# Patient Record
Sex: FEMALE | Race: WHITE
[De-identification: names, ages, dates, MRNs, and addresses within clinical notes are randomized per-mention and may not be internally consistent; named-entity substitution may affect disease eponyms.]

---

## 2019-12-23 LAB
ANISOCYTOSIS BLD QL: (no result)
BLD SMEAR INTERP: (no result)
BUN BLD-MCNC: 9 MG/DL (ref 7–18)
GLUCOSE SERPLBLD-MCNC: 96 MG/DL (ref 74–106)
HCT VFR BLD CALC: 30.8 % (ref 36–45)
LYMPHOCYTES # SPEC AUTO: 1.3 K/UL (ref 0.7–4.9)
MORPHOLOGY BLD-IMP: (no result)
PMV BLD: 7.2 FL (ref 7.6–11.3)
POTASSIUM SERPL-SCNC: 3.8 MMOL/L (ref 3.5–5.1)
RBC # BLD: 3.89 M/UL (ref 3.86–4.86)

## 2019-12-23 NOTE — RAD REPORT
EXAM DESCRIPTION:  RAD - Chest Pa And Lat (2 Views) - 12/23/2019 12:00 pm

 

CLINICAL HISTORY:  preopright arm soft tissue injury repair

 

COMPARISON:  None.

 

TECHNIQUE:  PA and lateral views of the chest were obtained.

 

FINDINGS:  The lungs are fibrotic as a baseline. An acute infiltrate is not identified. Scarring agrawal
ges and pericardial fat accentuate the left base. Patient has a large hiatal hernia that increases ov
erall density in the retrocardiac region.   Heart size is normal and central vasculature is within no
rmal limits.  No pleural effusion or pneumothorax seen.  No acute bony finding noted.  No aortic abno
rmality.

 

IMPRESSION:  Fibrotic lung change with no acute cardiopulmonary finding.

## 2019-12-23 NOTE — EKG
Test Date:    2019-12-23               Test Time:    11:42:35

Technician:   SHAYY                                    

                                                     

MEASUREMENT RESULTS:                                       

Intervals:                                           

Rate:         66                                     

TN:           150                                    

QRSD:         88                                     

QT:           422                                    

QTc:          442                                    

Axis:                                                

P:            61                                     

TN:           150                                    

QRS:          40                                     

T:            70                                     

                                                     

INTERPRETIVE STATEMENTS:                                       

                                                     

Sinus rhythm with premature atrial complexes

Otherwise normal ECG

Compared to ECG 09/05/2007 09:41:44

Atrial premature complex(es) now present



Electronically Signed On 12-23-19 20:46:41 CST by Peter Petersen

## 2019-12-26 ENCOUNTER — HOSPITAL ENCOUNTER (OUTPATIENT)
Dept: HOSPITAL 97 - OR | Age: 74
Discharge: HOME | End: 2019-12-26
Attending: SURGERY
Payer: COMMERCIAL

## 2019-12-26 VITALS — DIASTOLIC BLOOD PRESSURE: 68 MMHG | OXYGEN SATURATION: 94 % | TEMPERATURE: 97 F | SYSTOLIC BLOOD PRESSURE: 1365 MMHG

## 2019-12-26 DIAGNOSIS — T81.89XA: Primary | ICD-10-CM

## 2019-12-26 DIAGNOSIS — Z79.02: ICD-10-CM

## 2019-12-26 DIAGNOSIS — I38: ICD-10-CM

## 2019-12-26 DIAGNOSIS — F03.90: ICD-10-CM

## 2019-12-26 DIAGNOSIS — E78.5: ICD-10-CM

## 2019-12-26 DIAGNOSIS — Z88.0: ICD-10-CM

## 2019-12-26 DIAGNOSIS — F32.9: ICD-10-CM

## 2019-12-26 DIAGNOSIS — D64.9: ICD-10-CM

## 2019-12-26 DIAGNOSIS — I10: ICD-10-CM

## 2019-12-26 DIAGNOSIS — E03.9: ICD-10-CM

## 2019-12-26 PROCEDURE — 88304 TISSUE EXAM BY PATHOLOGIST: CPT

## 2019-12-26 PROCEDURE — 11042 DBRDMT SUBQ TIS 1ST 20SQCM/<: CPT

## 2019-12-26 PROCEDURE — 71046 X-RAY EXAM CHEST 2 VIEWS: CPT

## 2019-12-26 PROCEDURE — 80048 BASIC METABOLIC PNL TOTAL CA: CPT

## 2019-12-26 PROCEDURE — 93005 ELECTROCARDIOGRAM TRACING: CPT

## 2019-12-26 PROCEDURE — 0JBD0ZZ EXCISION OF RIGHT UPPER ARM SUBCUTANEOUS TISSUE AND FASCIA, OPEN APPROACH: ICD-10-PCS

## 2019-12-26 PROCEDURE — 85025 COMPLETE CBC W/AUTO DIFF WBC: CPT

## 2019-12-26 PROCEDURE — 36415 COLL VENOUS BLD VENIPUNCTURE: CPT

## 2019-12-26 NOTE — XMS REPORT
Patient Summary Document

 Created on:2019



Patient:SUNDEEP REYES

Sex:Female

:1945

External Reference #:[id number]





Demographics







 Address  [address]



   [place] [address]

 

 Home Phone  [phone] CELL

 

 Work Phone  [phone]

 

 Email Address  [phone]

 

 Preferred Language  Unknown

 

 Marital Status  Unknown

 

 Jew Affiliation  Unknown

 

 Race  Unknown

 

 Additional Race(s)  Unavailable

 

 Ethnic Group  Unknown









Author







 Organization  [facility]

 

 Address  [address] [place] [address] [place] [address]

 

 Phone  [phone]









Care Team Providers







 Name  Role  Phone

 

 Unavailable  Unavailable  Unavailable









Problems

This patient has no known problems.



Allergies, Adverse Reactions, Alerts

This patient has no known allergies or adverse reactions.



Medications

This patient has no known medications.

## 2019-12-26 NOTE — OP
Date of Procedure:  12/26/2019



Surgeon:  Jaime Gresham MD



Preoperative Diagnosis:  Nonhealing wound, right anterior shoulder.



Postoperative Diagnosis:  Nonhealing wound, right anterior shoulder.



Procedure:  Wide excision, nonhealing right anterior shoulder wounds 4 x 2 cm.



Estimated Blood Loss:  Minimal.



Specimens:  Nonhealing wound.



Findings:  Likely suture granuloma.



Anesthesia:  MAC.



Complications:  None.



Disposition:  Patient tolerated procedure in stable condition, taken to Recovery in good general cond
ition.



Procedure In Detail:  Patient was brought to the OR and placed in supine position.  MAC anesthesia wa
s begun.  The patient was prepped and draped in the usual sterile fashion.  Marcaine 0.5% was infiltr
ated locally.  A 15-blade was used to make a 4 x 2 cm incision around the area of the nonhealing woun
d down to the subcutaneous tissue, excised and sent to pathology as specimen.  No obvious suture seen
, but there was a lot of granulation tissue and inflammatory tissue that was seen.  This was all exci
sed to healthy tissue and then wound irrigated, bleeding controlled with cautery and 3-0 chromic used
 to approximate the subcutaneous tissue.  The skin left open as the patient had a draining wound prio
r to this and sterile dressing was applied.  Patient was awakened and taken to recovery in good gener
al condition.



Discharge Note:  The patient will go to Day-Surgery, home when stable.



Condition:  Stable.



Discharge Instructions:  Resume home medications and diet.  Activity as tolerated.  No heavy lifting.
  Remove outer dressing in 2 days.  Shower.  Keep wound clean and dry.  Dry gauze to wound daily.  Fo
llow up [facility] in [facility] weeks.  Call for appointment.  Tylenol No. 3 one tablet p.o. q
.4 p.r.n. pain and Cipro 500 mg p.o. q.12.  May resume blood thinners tomorrow.





AS/MODL

DD:  12/26/2019 11:21:09Voice ID:  [id number]

DT:  12/26/2019 22:48:25Report ID:  [id number]

## 2020-05-14 ENCOUNTER — HOSPITAL ENCOUNTER (EMERGENCY)
Dept: HOSPITAL 97 - ER | Age: 75
Discharge: HOME | End: 2020-05-14
Payer: COMMERCIAL

## 2020-05-14 VITALS — OXYGEN SATURATION: 96 % | TEMPERATURE: 99 F | SYSTOLIC BLOOD PRESSURE: 123 MMHG | DIASTOLIC BLOOD PRESSURE: 61 MMHG
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
## 2020-05-14 DIAGNOSIS — E78.5: ICD-10-CM

## 2020-05-14 DIAGNOSIS — E03.9: ICD-10-CM
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
## 2020-05-14 DIAGNOSIS — Z88.5: ICD-10-CM

## 2020-05-14 DIAGNOSIS — Z88.0: ICD-10-CM

## 2020-05-14 DIAGNOSIS — S01.01XA: Primary | ICD-10-CM

## 2020-05-14 DIAGNOSIS — I10: ICD-10-CM

## 2020-05-14 DIAGNOSIS — G40.509: ICD-10-CM

## 2020-05-14 DIAGNOSIS — Z88.8: ICD-10-CM

## 2020-05-14 DIAGNOSIS — Z91.018: ICD-10-CM

## 2020-05-14 DIAGNOSIS — I48.91: ICD-10-CM

## 2020-05-14 DIAGNOSIS — Z23: ICD-10-CM
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
## 2020-05-14 LAB
ALBUMIN SERPL BCP-MCNC: 2.8 G/DL (ref 3.4–5)
ALP SERPL-CCNC: 139 U/L (ref 45–117)
ALT SERPL W P-5'-P-CCNC: 21 U/L (ref 12–78)
AST SERPL W P-5'-P-CCNC: 19 U/L (ref 15–37)
BUN BLD-MCNC: 10 MG/DL (ref 7–18)
GLUCOSE SERPLBLD-MCNC: 102 MG/DL (ref 74–106)
HCT VFR BLD CALC: 34.3 % (ref 36–45)
LYMPHOCYTES # SPEC AUTO: 0.9 K/UL (ref 0.7–4.9)
PMV BLD: 7.2 FL (ref 7.6–11.3)
POTASSIUM SERPL-SCNC: 4.3 MMOL/L (ref 3.5–5.1)
RBC # BLD: 3.81 M/UL (ref 3.86–4.86)
TROPONIN (EMERG DEPT USE ONLY): < 0.02 NG/ML (ref 0–0.04)

## 2020-05-14 PROCEDURE — 99284 EMERGENCY DEPT VISIT MOD MDM: CPT

## 2020-05-14 PROCEDURE — 72125 CT NECK SPINE W/O DYE: CPT

## 2020-05-14 PROCEDURE — 85025 COMPLETE CBC W/AUTO DIFF WBC: CPT

## 2020-05-14 PROCEDURE — 70450 CT HEAD/BRAIN W/O DYE: CPT

## 2020-05-14 PROCEDURE — 90714 TD VACC NO PRESV 7 YRS+ IM: CPT

## 2020-05-14 PROCEDURE — 84484 ASSAY OF TROPONIN QUANT: CPT

## 2020-05-14 PROCEDURE — 87086 URINE CULTURE/COLONY COUNT: CPT

## 2020-05-14 PROCEDURE — 80053 COMPREHEN METABOLIC PANEL: CPT

## 2020-05-14 PROCEDURE — 12001 RPR S/N/AX/GEN/TRNK 2.5CM/<: CPT

## 2020-05-14 PROCEDURE — 90471 IMMUNIZATION ADMIN: CPT

## 2020-05-14 PROCEDURE — 81003 URINALYSIS AUTO W/O SCOPE: CPT

## 2020-05-14 PROCEDURE — 0JQ00ZZ REPAIR SCALP SUBCUTANEOUS TISSUE AND FASCIA, OPEN APPROACH: ICD-10-PCS

## 2020-05-14 PROCEDURE — 36415 COLL VENOUS BLD VENIPUNCTURE: CPT

## 2020-05-14 PROCEDURE — 93005 ELECTROCARDIOGRAM TRACING: CPT

## 2020-05-14 PROCEDURE — 87088 URINE BACTERIA CULTURE: CPT

## 2020-05-14 PROCEDURE — 96374 THER/PROPH/DIAG INJ IV PUSH: CPT

## 2020-05-14 NOTE — XMS REPORT
Summary of Care

                            Created on:2020



Patient:Radha Garza

Sex:Female

:1945

External Reference #:[id number]





Demographics







                          Address                   [address]



                                                    [place] [address]

 

                          Home Phone                [phone]

 

                          Mobile Phone              [phone]

 

                          Phone                     [phone]

 

                          Email Address             [email or web address]

 

                          Preferred Language        English

 

                          Marital Status            

 

                          Lutheran Affiliation     Unknown

 

                          Race                      White

 

                          Ethnic Group              Not  or 









Author







                          Organization              [facility]

 

                          Address                   [address] [place] [address]









Support







                Name            Relationship    Address         Phone

 

                Yin Boss     Unavailable     [address]   [phone]



                                                [place] [address] 

 

                Ryan Garza Unavailable     [address] [facility] [phone]



                                                [place] [address] 









Care Team Providers







                    Name                Role                Phone

 

                    MD Davis         Primary Care Provider [phone]









Reason for Visit







                          Reason                    Comments

 

                          Appointment               1 week surgery hfu







Encounter Details







             Date         Type         Department   Care Team    Description

 

             2020   Telephone    [facility]

pointment (1 week



                                                            Specialties- David macdonald MD



                                        surgery hfu )



                                                            [address]

e



                          [address] [facility]         



                                                            [address]



                                        [facility] [address]



                                        



                                                            [address]



                          [place]           



                                                [phone]    [phone]



                                        



                                                                [phone] (Fax) 







Allergies







             Active Allergy Reactions    Severity     Noted Date   Comments

 

             Chocolate Flavor Anxiety                   2015   

 

             Nitrofurantoin Unknown - See              2015   



             Monohyd/M-Cryst comments                               

 

             Morphine     Nausea and/or              2017   



                          Vomiting                               

 

             Penicillin   Other - See comments              2017   Pt stat

es "several



                                                                 family members 

are



                                                                 allergic so i t

hink



                                                                 i am"



documented as of this encounter (statuses as of 2020)



Medications







          Medication Sig       Dispensed Refills   Start Date End Date  Status

 

          lovastatin 20 mg tablet Take 20 mg by           0         2017  

         Active



                    mouth daily.                                         

 

          traMADOL (ULTRAM) 50 mg Take 1 tablet 20 tablet 0         2018  

         Active



          tablet    by mouth every                                         



                    6 (six) hours                                         



                    as needed for                                         



                    Pain (scale                                         



                    4-6).                                             

 

          mupirocin 2 % Apply  to 22 g      0         2018           Activ

e



          ointmentIndications: area(s) 3                                        

 



          Abscess   (three) times                                         



                    daily.                                            

 

          Ferrous Fumarate-Folic Take 1 tablet 90 tablet 1         2019   

        Active



          Acid (HEMATINIC/FOLIC by mouth daily.                                 

        



          ACID) 324 mg (106 mg                                                  

 



          iron)-1 mg                                                   



          TabIndications: Iron                                                  

 



          deficiency anemia,                                                   



          unspecified iron                                                   



          deficiency anemia type                                                

   

 

          vitamin B-12 1,000 mcg Take 1 tablet 30 tablet 0         2020   

        Active



          tabletIndications: by mouth daily.                                    

     



          Dizziness, PAF                                                   



          (paroxysmal atrial                                                   



          fibrillation)                                                   

 

          metoprolol succinate XL Take 1 tablet 30 tablet 0         2020  

         Active



          50 mg 24 hr by mouth daily.                                         



          tabletIndications:                                                   



          Dizziness, PAF                                                   



          (paroxysmal atrial                                                   



          fibrillation)                                                   

 

          doxycycline monohydrate Take 100 mg by           0                    

         Active



          100 mg capsule mouth 2 (two)                                         



                    times daily.                                         

 

          ciprofloxacin HCl 500 Take 500 mg by           0                      

       Active



          mg tablet mouth 2 (two)                                         



                    times daily.                                         

 

          hydrocortisone Insert 1 Dose           0                             A

ctive



          (PROCTOSOL HC RECTAL) into rectum 2                                   

      



                    (two) times                                         



                    daily.                                            

 

          rivastigmine 4.6 mg/24 Apply 1 Patch           0         2020   

        Active



          hr patch  to skin daily.                                         

 

          ciclopirox (PENLAC) 8 % Apply 1 Dose to           0                   

          Active



          solution  area(s) at                                         



                    bedtime. Apply                                         



                    to Nails.                                         

 

          Cholecalciferol, Take 1 capsule           0                           

  Active



          Vitamin D3, (VITAMIN by mouth daily.                                  

       



          D3) 25 mcg (1,000 unit)                                               

    



          capsule                                                     

 

          lactobacillus Take 1 tablet 28 tablet 0         04/10/2020 2020 

Active



          acidophilus 25 million by mouth 4                                     

    



          cell -100 mg (four) times                                         



          captabIndications: daily for 7                                        

 



          Right arm pain days.                                             

 

          levothyroxine 100 mcg Take 1 tablet 30 tablet 0         2020 Active



          tabletIndications: by mouth every                                     

    



          Hypothyroidism, morning for 30                                        

 



          unspecified type days.                                             

 

          mirtazapine 15 mg Take 1 tablet 30 tablet 0         04/10/2020 05/10/2

020 Active



          tabletIndications: by mouth at                                        

 



          Dementia with bedtime for 30                                         



          behavioral disturbance, days.                                         

    



          unspecified dementia                                                  

 



          type                                                        



documented as of this encounter (statuses as of 2020)



Active Problems







                          Problem                   Noted Date

 

                          Opacity of lung on imaging study 04/10/2020

 

                          Syncope                   2020

 

                          LOC (loss of consciousness) 2020

 

                          PAF (paroxysmal atrial fibrillation) 2020

 

                          Orthostatic hypotension   2020

 

                          Dizziness                 2020

 

                          S/p reverse total shoulder arthroplasty 2017

 

                          Right arm pain            2017

 

                          Essential hypertension    03/10/2016

 

                          Hyperlipemia              03/10/2016

 

                          Hypothyroidism            03/10/2016

 

                          Iron (Fe) deficiency anemia 03/10/2016

 

                          Vitamin D deficiency      03/10/2016



documented as of this encounter (statuses as of 2020)



Immunizations







                    Name                Administration Dates Next Due

 

                    Influenza High Dose 2019, 10/12/2017 

 

                    Pneumococcal Polysaccharide, PPSV23 (PNEUMOVAX) 2019  

        



documented as of this encounter



Social History







             Tobacco Use  Types        Packs/Day    Years Used   Date

 

             Never Smoker Cigarettes                1            Quit: 03/10/198

0









                Smokeless Tobacco: Never Used                                 









                Alcohol Use     Drinks/Week     oz/Week         Comments

 

                Yes                                             Occasional Drink

er









                          Sex Assigned at Birth     Date Recorded

 

                          Not on file               









                    Job Start Date      Occupation          Industry

 

                    Not on file         Not on file         Not on file









                    Travel History      Travel Start        Travel End









                                        No recent travel history available.



documented as of this encounter



Last Filed Vital Signs

Not on filedocumented in this encounter



Plan of Treatment







             Date         Type         Specialty    Care Team    Description

 

                2020      Telemedicine Visit Family Medicine Marie Cannon MD



                                        



                                                                [address] [place] [address]

 [phone] (Fax) 









                Health Maintenance Due Date        Last Done       Comments

 

                DTaP,Tdap,and Td Vaccines (1 - Tdap) 1954                 

     

 

                Zoster Recombinant Vaccine (SHINGRIX) (1 1993             

         



                of 2)                                           

 

                Medicare Wellness Visit 2008                      

 

                Osteoporosis Screening 2008                      

 

                PNEUMOCOCCAL VACCINES 65+ (2 of 2 - PCV13) 2020      

 

                INFLUENZA VACCINE Completed       2019, 10/12/2017 



documented as of this encounter



Implants







          Implanted Type      Area       Device    Shelf     Model /



                                                  Identifier Expiration Serial /

 Lot



                                                            Date      

 

          Cement    CEMENT    Right:    Biomet              2018 [id number]

 /



                                        Implanted: Qty: 1 on 2017 by Greg Garcia MD at [facility]            Shoulder                                    6

[id number] /



                                                                      [id number]

 

          Cement    CEMENT    Right:    Biomet              2018 [id number]

1 /



                                        Implanted: Qty: 1 on 2017 by Greg Garcia MD at [facility]            Shoulder                                    6

[id number] /



                                                                      [id number]

 

          Central Screw SCREW     Right:    Biomet              08/10/2027 18725

5 /



                                        Implanted: Qty: 1 on 2017 by Greg Garcia MD at [facility]            Shoulder                                    7

[id number] /



                                                                      [id number]

 

          Locking Screw SCREW     Right:    Biomet              2027 42512

0 /



                                        Implanted: Qty: 1 on 2017 by Greg Garcia MD at [facility]            Shoulder                                    4

[id number] /



                                                                      [id number]

 

          Locking Screw SCREW     Right:    Biomet              2027 47916

0 /



                                        Implanted: Qty: 1 on 2017 by Greg Garcia MD at [facility]            Shoulder                                    2

[id number] /



                                                                      [id number]

 

          Nonlocking Screw SCREW     Right:    Biomet              2027 18

0557 /



                                        Implanted: Qty: 1 on 2017 by Greg Garcia MD at [facility]            Shoulder                                    8

[id number] /



                                                                      [id number]

 

          Nonlocking Screw SCREW     Right:    Biomet              2027 18

0557 /



                                        Implanted: Qty: 1 on 2017 by Greg Garcia MD at [facility]            Shoulder                                    4

[id number] /



                                                                      [id number]

 

          Bone Cement Restrictor SHOULDER  Right:    Biomet              

021 [id number] /



                                        Implanted: Qty: 1 on 2017 by Greg Garcia MD at [facility]            Shoulder                                    3

[id number] /



                                                                      [id number]

 

          Humeral Bearing SHOULDER  Right:    Biomet              2022 XL-

[id number] /



                                        Implanted: Qty: 1 on 2017 by Greg Garcia MD at [facility]            Shoulder                                    2

[id number] /



                                                                      [id number]

 

          Humeral Tray With Locking Ring SHOULDER  Right:    Biomet             

 2027 [id number] /



                                        Implanted: Qty: 1 on 2017 by Greg Garcia MD at [facility]            Shoulder                                    2

[id number] /



                                                                      [id number]

 

          Mini Baseplate SHOULDER  Right:    Biomet              2027 0100

[id number] /



                                        Implanted: Qty: 1 on 2017 by Greg Garcia MD at [facility]            Shoulder                                    1

[id number] /



                                                                      [id number]

 

          Glenosphere SHOULDER  Right:    Biomet              2027 [id number] 

/



                                        Implanted: Qty: 1 on 2017 by Greg Garcia MD at [facility]            Shoulder                                    7

[id number] /



                                                                      [id number]

 

          Humeral Fracture Stem Stem      Right:    Biomet              20

27 [id number] /



                                        Implanted: Qty: 1 on 2017 by Greg Garcia MD at [facility]            Shoulder                                    4

[id number] /



                                                                      [id number]



documented as of this encounter



Results

Not on filedocumented in this encounter



Insurance







          Payer     Benefit Plan / Subscriber ID Effective Dates Phone     Addre

ss   Type



                    Group                                             

 

          MEDICARE  MEDICARE PART xxxxxxxxxxx 2008-Teresa [phone] [facility] 

[facility] 

Medicare



                      A & B                 t          2          [id number]



                                        



                                                            [place] 



                                                            [id number] 



documented as of this encounter

## 2020-05-14 NOTE — XMS REPORT
Summary of Care

                            Created on:April 10, 2020



Patient:Sundeep Garza

Sex:Female

:1945

External Reference #:[id number]





Demographics







                          Address                   [address]



                                                    [place] [address]

 

                          Home Phone                [phone]

 

                          Mobile Phone              [phone]

 

                          Phone                     [phone]

 

                          Email Address             [email or web address]

 

                          Preferred Language        English

 

                          Marital Status            

 

                          [place] Affiliation     Unknown

 

                          Race                      White

 

                          Ethnic Group              Not  or 









Author







                          Organization              [facility]

 

                          Address                   [address] [place] [address]









Support







                Name            Relationship    Address         Phone

 

                Yin Boss     Unavailable     [address]   [phone]



                                                [place] [address] 

 

                Ryan Garza Unavailable     [address] [facility] [phone]



                                                [place] [address] 









Care Team Providers







                    Name                Role                Phone

 

                    MD Davis         Primary Care Provider [phone]









Reason for Referral

Other (Routine)





           Status     Reason     Specialty  Diagnoses / Referred By Referred To



                                            Procedures Contact    Contact

 

                New Request                                     Diagnoses



Injury of head, initial encounter       Gm Tavarez MD Humphrey, Laurel,



                                                                



Procedures



Discharge Follow-up: Specialty Provider BELINDA AGUERO; 1 Week [address] [facility]



                                        MD







                                                       [place], [place] [address] [facility]



                                                                 [address]



                                        [place]







                                                       Phone:     Los [phone]



                                        [place], [place]



                                                       Fax: [phone]







                                                                  Phone:



                                                                  [phone]







                                                                  Fax: [phone]

[phone]





Other (Routine)





           Status     Reason     Specialty  Diagnoses / Referred By Referred To



                                            Procedures Contact    Contact

 

                New Request                                     Diagnoses



Syncope, unspecified syncope type       Gm Tavarez MD Dabaghi, Salim F,



                                                                



Procedures



Discharge Follow-up: Specialty Provider LUIS ALBERTO SMILEY; 1 Week [address] [facility]



                                        MD







                                                       [place],  E HOSP 

 [facility]



                                                                 [address]



                                        [address]







                                                       Phone:     [address]







                                                                 [phone]



                                        [place], [place]



                                                       Fax: [phone] [address]

171







                                                                  Phone:



                                                                  [phone]







                                                                  Fax: [phone]

[phone]





 (Routine)





           Status     Reason     Specialty  Diagnoses / Referred By Referred To



                                            Procedures Contact    Contact

 

                New Request                                     Diagnoses



Syncope, unspecified syncope type       Gm Tavarez MD Rogers, Anthony,



                                                                



Procedures



Discharge Follow-up: PCP [address]; 2 Weeks [address] [place] [address]



                                        EMILIANO Wakefield [address]



                                                                 [address]



                                        DRIVE







                                                       Phone:     EMILIANO [place]



                                                                 [phone] [address]







                                                       Fax: [phone] Phone:



                                                                  [phone]







                                                                  Fax: [phone]





 (Routine)





           Status     Reason     Specialty  Diagnoses / Referred By Referred To



                                            Procedures Contact    Contact

 

                New Request                     Vascular        Procedures



                          Jovanny Aguilar            



                                       Sonography   CAROTID DUPLEX MD MERNA



                                        



                                            BILATERAL  [facility] 



                                                    VASCULAR LAB [address]ANKS



                                        



                                                       [place] 



                                                                 [address]



                                        



                                                       Phone:     



                                                                 [phone]



                                        



                                                       Fax:       



                                                       [phone] 





MRI/CAT Scan (Routine)





           Status     Reason     Specialty  Diagnoses / Referred By Referred To



                                            Procedures Contact    Contact

 

                Authorized                      Diagnostic      Diagnoses



Injury of head, initial encounter Gavino Cadet,           



                                                Radiology       



Procedures



CT HEAD WO CONTRAST                     MD



                                        



                                                       [address] [place] [address].



                                        



                                                                 [address] [place] [address] 



                                                                 [address]



                                        



                                                       Phone:     



                                                                 [phone]



                                        



                                                       Fax: [phone] 





MRI/CAT Scan (ASAP)





           Status     Reason     Specialty  Diagnoses / Referred By Referred To



                                            Procedures Contact    Contact

 

                Authorized                      Diagnostic      Diagnoses



Opacity of lung on imaging study Gavino Cadet,           



                                                Radiology       



Procedures



CT THORAX WO CONTRAST                   MD



                                        



                                                       [address] [place] [address].



                                        



                                                                 [address]



                                        



                                                       [place] 



                                                                 [address]



                                        



                                                       Phone:     



                                                                 [phone]



                                        



                                                       Fax: [phone] 





Radiology Services (STAT)





           Status     Reason     Specialty  Diagnoses / Referred By Referred To



                                            Procedures Contact    Contact

 

                New Request                     Diagnostic      Diagnoses



Fall, initial encounter



Injury of head, initial encounter



Contusion of scalp, initial encounter



LOC (loss of consciousness) Nancy Mercedes,           



                                                Radiology       



Procedures



XR CHEST 1 VW                           PAC



                                        



                                                       [address]"" 



                                                                 



                                        



                                                       [facility] [place] 



                                                                 [address]



                                        



                                                       Phone:     



                                                                 [phone]



                                        



                                                       Fax:       



                                                       [phone] 





MRI/CAT Scan (STAT)





           Status     Reason     Specialty  Diagnoses / Referred By Referred To



                                            Procedures Contact    Contact

 

                New Request                     Diagnostic      Diagnoses



Fall, initial encounter   Nancy Mercedes,           



                                                Radiology       



Procedures



CT CERVICAL SPINE WO CONTRAST           PAC



                                        



                                                       [address] 



                                                                 



                                        



                                                       [facility] [place] 



                                                                 [address]



                                        



                                                       Phone:     



                                                                 [phone]



                                        



                                                       Fax:       



                                                       [phone] 





MRI/CAT Scan (STAT)





           Status     Reason     Specialty  Diagnoses / Referred By Referred To



                                            Procedures Contact    Contact

 

                New Request                     Diagnostic      Diagnoses



Fall, initial encounter   Nancy Mercedes,           



                                                Radiology       



Procedures



CT HEAD WO CONTRAST                     PAC



                                        



                                                       [address] [place] [address] 



                                                                 [place] 



                                                                 [address]



                                        



                                                       Phone:     



                                                                 [phone]



                                        



                                                       Fax:       



                                                       [phone] 









Reason for Visit







                          Reason                    Comments

 

                          Fall                      



Auth/Cert





           Status     Reason     Specialty  Diagnoses / Referred By Referred To



                                            Procedures Contact    Contact

 

                                 Emergency Medicine                       [facility]

ergency



                                                                  Dept







                                                                  [address] [place] [address]



                                                                  [place]



                                                                  [address]







                                                                  Phone:



                                                                  [phone]







                                                                  Fax: [phone]









Encounter Details







             Date         Type         Department   Care Team    Description

 

                2020 -    Emergency       [facility] Marisol Mercedes, PAC



[address] [place] [address] [place] [address] [phone] (Fax)                      [facility]



                    04/10/2020                              Unit



                                        



Tahira Pearce, FNP



[address] [facility]



[address]



[place] [address] [phone] (Fax)                      



                                                            [address] [place] [address] Gavino Mckee MD



[address] [place], [place] [address]vd.



[address]



[place], [place] [id number] [phone] (Fax)                      



                                                            [place] [address] [phone]              







Allergies







             Active Allergy Reactions    Severity     Noted Date   Comments

 

             Chocolate Flavor Anxiety                   2015   

 

             Nitrofurantoin Unknown - See              2015   



             Monohyd/M-Cryst comments                               

 

             Morphine     Nausea and/or              2017   



                          Vomiting                               

 

             Penicillin   Other - See comments              2017   Pt stat

es "several



                                                                 family members 

are



                                                                 allergic so i t

hink



                                                                 i am"



documented as of this encounter (statuses as of 04/10/2020)



Medications







          Medication Sig       Dispensed Refills   Start Date End Date  Status

 

          lovastatin 20 mg Take 20 mg           0         2017           A

ctive



          tablet    by mouth                                          



                    daily.                                            

 

          traMADOL (ULTRAM) Take 1    20 tablet 0         2018           A

ctive



          50 mg tablet tablet by                                         



                    mouth every                                         



                    6 (six)                                           



                    hours as                                          



                    needed for                                         



                    Pain (scale                                         



                    4-6).                                             

 

          mupirocin 2 % Apply  to 22 g      0         2018           Activ

e



          ointmentIndications area(s) 3                                         



          : Abscess (three)                                           



                    times daily.                                         

 

          Ferrous   Take 1    90 tablet 1         2019           Active



          Fumarate-Folic Acid tablet by                                         



          (HEMATINIC/FOLIC mouth daily.                                         



          ACID) 324 mg (106                                                   



          mg iron)-1 mg                                                   



          TabIndications:                                                   



          Iron deficiency                                                   



          anemia, unspecified                                                   



          iron deficiency                                                   



          anemia type                                                   

 

          vitamin B-12 1,000 Take 1    30 tablet 0         2020           

Active



          mcg       tablet by                                         



          tabletIndications: mouth daily.                                       

  



          Dizziness, PAF                                                   



          (paroxysmal atrial                                                   



          fibrillation)                                                   

 

          metoprolol Take 1    30 tablet 0         2020           Active



          succinate XL 50 mg tablet by                                         



          24 hr     mouth daily.                                         



          tabletIndications:                                                   



          Dizziness, PAF                                                   



          (paroxysmal atrial                                                   



          fibrillation)                                                   

 

          doxycycline Take 100 mg           0                             Active



          monohydrate 100 mg by mouth 2                                         



          capsule   (two) times                                         



                    daily.                                            

 

          ciprofloxacin HCl Take 500 mg           0                             

Active



          500 mg tablet by mouth 2                                         



                    (two) times                                         



                    daily.                                            

 

          hydrocortisone Insert 1            0                             Activ

e



          (PROCTOSOL HC Dose into                                         



          RECTAL)   rectum 2                                          



                    (two) times                                         



                    daily.                                            

 

          rivastigmine 4.6 Apply 1             0         2020           Ac

tive



          mg/24 hr patch Patch to                                          



                    skin daily.                                         

 

          ciclopirox (PENLAC) Apply 1 Dose           0                          

   Active



          8 % solution to area(s)                                         



                    at bedtime.                                         



                    Apply to                                          



                    Nails.                                            

 

          Cholecalciferol, Take 1              0                             Act

che



          Vitamin D3, capsule by                                         



          (VITAMIN D3) 25 mcg mouth daily.                                      

   



          (1,000 unit)                                                   



          capsule                                                     

 

          lactobacillus Take 1    28 tablet 0         04/10/2020 04/17/202 Activ

e



          acidophilus 25 tablet by                               0         



          million cell -100 mouth 4                                           



          mg        (four) times                                         



          captabIndications: daily for 7                                        

 



          Right arm pain days.                                             

 

          levothyroxine 100 Take 1    30 tablet 0         2020 A

ctive



          mcg       tablet by                               0         



          tabletIndications: mouth every                                        

 



          Hypothyroidism, morning for                                         



          unspecified type 30 days.                                          

 

          mirtazapine 15 mg Take 1    30 tablet 0         04/10/2020 05/10/202 A

ctive



          tabletIndications: tablet by                               0         



          Dementia with mouth at                                          



          behavioral bedtime for                                         



          disturbance, 30 days.                                          



          unspecified                                                   



          dementia type                                                   

 

          Cholecalciferol, Take 1,000           0                    Di

scontinued



          Vitamin D3, Units by                                0         (Error)



          (VITAMIN D3) 1,000 mouth every                                        

 



          unit capsule morning.                                          

 

          ciclopirox 8 % Apply  to 6.6 mL    2         10/04/2017 04/09/202 Disc

ontinued



          solutionIndications area(s) at                               0        

 (Error)



          : Onychomycosis bedtime.                                          



                    Apply to dry                                         



                    nail/surroun                                         



                    ding skin.                                         



                    Reapply                                           



                    nightly.                                          



                    Remove with                                         



                    alcohol,                                          



                    trim/file                                         



                    nails on day                                         



                    7. repeat                                         

 

          rivaroxaban 15 mg Take 1              0         2018 04/10/202 D

iscontinued



          tablet    tablet by                               0         



                    mouth daily.                                         

 

          hydrocortisone Insert  into 30 g      1         2018 D

iscontinued



          (PROCTOSOL HC) 2.5 rectum 2                                0         (

Error)



          % rectal cream (two) times                                         



                    daily.                                            

 

          levothyroxine 88 TAKE 1    90 tablet 1         10/29/2019 04/10/202 Di

scontinued



          mcg       TABLET BY                               0         



          tabletIndications: MOUTH ONCE                                         



          Hypothyroidism, DAILY IN THE                                         



          unspecified type MORNING                                           

 

          rivastigmine 4.6 Apply 1   30 Patch  1         2020 Di

scontinued



          mg/24 hr  Patch to                                0         (Error)



          patchIndications: skin daily.                                         



          Late onset                                                   



          Alzheimer's disease                                                   



          without behavioral                                                   



          disturbance                                                   

 

          mirtazapine 7.5 mg Take 1    30 tablet 1         2020 

Discontinued



          tablet    tablet by                               0         (Error)



                    mouth at                                          



                    bedtime.                                          

 

          mirtazapine 7.5 mg Take 7.5 mg           0                   04/10/202

 Discontinued



          tablet    by mouth at                               0         



                    bedtime.                                          



documented as of this encounter (statuses as of 04/10/2020)



Active Problems







                          Problem                   Noted Date

 

                          Opacity of lung on imaging study 04/10/2020

 

                          Syncope                   2020

 

                          LOC (loss of consciousness) 2020

 

                          PAF (paroxysmal atrial fibrillation) 2020

 

                          Orthostatic hypotension   2020

 

                          Dizziness                 2020

 

                          S/p reverse total shoulder arthroplasty 2017

 

                          Right arm pain            2017

 

                          Essential hypertension    03/10/2016

 

                          Hyperlipemia              03/10/2016

 

                          Hypothyroidism            03/10/2016

 

                          Iron (Fe) deficiency anemia 03/10/2016

 

                          Vitamin D deficiency      03/10/2016



documented as of this encounter (statuses as of 04/10/2020)



Immunizations







                    Name                Administration Dates Next Due

 

                    Influenza High Dose 2019, 10/12/2017 

 

                    Pneumococcal Polysaccharide, PPSV23 (PNEUMOVAX) 2019  

        



documented as of this encounter



Social History







             Tobacco Use  Types        Packs/Day    Years Used   Date

 

             Never Smoker Cigarettes                1            Quit: 03/10/198

0









                Smokeless Tobacco: Never Used                                 









                Alcohol Use     Drinks/Week     oz/Week         Comments

 

                Yes                                             Occasional Drink

er









                          Sex Assigned at Birth     Date Recorded

 

                          Not on file               









                    Job Start Date      Occupation          Industry

 

                    Not on file         Not on file         Not on file









                    Travel History      Travel Start        Travel End









                                        No recent travel history available.



documented as of this encounter



Last Filed Vital Signs







                Vital Sign      Reading         Time Taken      Comments

 

                Blood Pressure  171/76          04/10/2020  4:00 PM CDT 

 

                Pulse           87              04/10/2020  4:00 PM CDT 

 

                Temperature     36.7 C (98 F) 04/10/2020  4:00 PM CDT 

 

                Respiratory Rate 20              04/10/2020  4:00 PM CDT 

 

                Oxygen Saturation 98%             04/10/2020  4:00 PM CDT 

 

                Inhaled Oxygen Concentration -               -               

 

                Weight          72.6 kg (160 lb) 2020  1:24 PM CDT 

 

                Height          -               -               

 

                Body Mass Index 28.34           2020  3:33 PM CST 



documented in this encounter



Discharge Summaries

Gm Tavarez MD - 04/10/2020  4:52 PM CDTFormatting of this note might be 
different from the original.

Hospital Medicine Discharge Summary



PATIENT NAME: Sundeep Garza

: 1945

AGE: 74 year old

MRN: [id number]



PRIMARY CARE PHYSICIAN: Edna Street



ADMITTING PHYSICIAN: Gavino Cadet MD



DISCHARGE PHYSICIAN: Gm Tavarez MD



ADMISSION DATE: 2020



DISCHARGE DATE: 4/10/2020



DISCHARGE DIAGNOSES:

Principal Problem:

  Syncope

Active Problems:

  Opacity of lung on imaging study



HPI



73 y/o female who has advanced demntia and on xarelto for atrial fibrillation 
(follows Dr. Smiley) presents after falling (standing in kitchen) and hit head 
and possible some convulsions. Unclear story as daughter wasn't there and 
witnessed by another family member. Patient not able to give proper history due 
to dementia. Currently patient at baseline orientation, knows self and maybe 
place. She denies any acute symptoms of headaches, vision changes, chest pain, 
sob, fevers/chills, dysuria but her ROS is not reliable given dementia. ER and 
general surgery saw patient and placed sutures. There is concern for continuous 
oozing.



HOSPITAL COURSE:



Patient is a 74F with advanced dementia and A-fib on Xarelto, who presented with
a fall at home.



# Recurrent syncope and fall, unclear cause

Recent work up during last stay was negative. Patient was seen by Cardiology/Dr. Aguilar. Carotid doppler showed no stenosis this time.



# ? Possible convulsion at home

Seizure precautions. CT head and CT cervical spine negative.



# scalp laceration and hematoma

- laceration sutured in ER

- follow with surgery

- dressing with pressure

- hold Xarelto

- follow with Dr. Smiley/cardiology regarding resuming of Xarelto



# diaphram hernia on CT chest

- incidental finding



# Left moderate hiatal hernia



# Right should wound. Hx of right shoulder surgery about 2-3 years ago by Dr. Pennington

Follows wound care physician in [place]

Continue home cipro and doxy

Probiotics



# Chronic atrial fibrillation

Hold xarelto until f/u with Dr. Smiley

Metoprolol xl



# Hypothyroidism

- TSH high. Levothyroxine dose increased



# Advanced dementia with behavior disturbance

- Follows Dr. Rodriguez

- mirtazapine increased dose at night, 7.5 a day wasn't working

- Called the patient's daughter, who agreed with SNF placement.



REVIEW OF SYSTEMS:



Unable to obtain due to dementia



DISCHARGE PHYSICAL EXAM:



Most recent vital signs:

Temp: 36.7 C (98 F) - BP: (!) 171/76 - Pulse: 87 - Resp: 20 - SpO2: 98 %



General: alert and awake, confused; no apparent distress

HEENT: pupils equal, round, reactive to light; extraocular movements intact; 
oropharynx clear; moistmucous membranes

Neck: supple, no lymphadenopathy, no bruits, no JVD

Lungs: clear to auscultation bilaterally

Cardio: S1, S2 normal; no murmurs, rubs or gallops

Abdomen: soft; non-tender; non-distended; normoactive bowel sounds

Extremities: no clubbing, cyanosis, or edema

Skin: hematoma, chronic, at right shoulder; scalp laceration s/p suturing



CONSULTS:

Surgery

Cardiology



PROCEDURES:

Laceration suturing



LABS PENDING:

None



DIET: regular



ACTIVITY: as tolerated



MEDS:

Current Discharge Medication List



START taking these medications

 Details

lactobacillus acidophilus (ACIDOPHILLUS) 1 tablet Take 1 tablet by mouth 4 
(four) times daily.

Qty: 28 tablet, Refills: 0

Start date: 4/10/2020, End date: 2020

 Associated Diagnoses: Right arm pain





CONTINUE these medications which have CHANGED

 Details

levothyroxine (SYNTHROID) 100 mcg Take 100 mcg by mouth every morning.

Qty: 30 tablet, Refills: 0

Start date: 2020, End date: 2020

 Associated Diagnoses: Hypothyroidism, unspecified type



mirtazapine (REMERON) 15 mg Take 15 mg by mouth at bedtime.

Qty: 30 tablet, Refills: 0

Start date: 4/10/2020, End date: 5/10/2020

 Associated Diagnoses: Dementia with behavioral disturbance, unspecified 
dementia type





CONTINUE these medications which have NOT CHANGED

 Details

Cholecalciferol (Vitamin D3) (VITAMIN D3) 1 capsule Take 1 capsule by mouth 
daily.



ciclopirox (PENLAC) 1 Dose Apply 1 Dose to area(s) at bedtime. Apply to Nails.



ciprofloxacin HCl (CIPRO) 500 mg Take 500 mg by mouth 2 (two) times daily.



doxycycline monohydrate (MONODOX) 100 mg Take 100 mg by mouth 2 (two) times 
daily.



hydrocortisone (PROCTOSOL HC RECTAL) 1 Dose Insert 1 Dose into rectum 2 (two) 
times daily.



rivastigmine (EXELON) 1 Patch Apply 1 Patch to skin daily.



metoprolol succinate XL (TOPROL XL) 50 mg Take 50 mg by mouth daily.

Qty: 30 tablet, Refills: 0

 Associated Diagnoses: Dizziness; PAF (paroxysmal atrial fibrillation)



vitamin B-12 (CYANOCOBALAMIN) 1,000 mcg Take 1,000 mcg by mouth daily.

Qty: 30 tablet, Refills: 0

 Associated Diagnoses: Dizziness; PAF (paroxysmal atrial fibrillation)



Ferrous Fumarate-Folic Acid (HEMOCYTE-F) 1 tablet Take 1 tablet by mouth daily.

Qty: 90 tablet, Refills: 1

 Associated Diagnoses: Iron deficiency anemia, unspecified iron deficiency 
anemia type



mupirocin 2 % ointment Apply  to area(s) 3 (three) times daily.

Qty: 22 g, Refills: 0

 Associated Diagnoses: Abscess



traMADol (ULTRAM) 50 mg Take 50 mg by mouth every 6 (six) hours as needed for 
Pain (scale 4-6).

Qty: 20 tablet, Refills: 0

 Comments: Byron Cantrell PA-C   JEVON# [id number]  [place] Lic.# [id number]  NPI# 
[id number]



lovastatin (MEVACOR) 20 mg Take 20 mg by mouth daily.





STOP taking these medications



 rivaroxaban (XARELTO) 15 mg Comments:

Reason for Stopping:







SIGNIFICANT LAB/X-RAYS:

Lab results:

CBC BMP PT/INR

WBC x10^3 (/CMM)

Date Value

2005 6.2



WBC (10*3/L)

Date Value

04/10/2020 5.41

  NA

Date Value

04/10/2020 137 mmol/L

2005 138 MMOL/L

  No results found for: PT

RBC x10^6 (/CMM)

Date Value

2005 4.31



RBC (10*6/L)

Date Value

04/10/2020 3.10 (L)

 K

Date Value

04/10/2020 3.5 mmol/L

2005 3.6 MMOL/L

 PT INR (no units)

Date Value

2005 0.9



INR (no units)

Date Value

2020 1.2



PLT x10^3 (/CMM)

Date Value

2005 290



PLT (10*3/L)

Date Value

04/10/2020 231

 CALCIUM

Date Value

04/10/2020 8.3 mg/dL (L)

2005 9.8 MG/DL



HGB

Date Value

04/10/2020 9.1 g/dL (L)

2005 12.0 G/DL

  CL

Date Value

04/10/2020 107 mmol/L

2005 100 MMOL/L

  aPTT

HCT (%)

Date Value

04/10/2020 27.9 (L)

2005 37.5

  BUN

Date Value

04/10/2020 11 mg/dL

2005 11 MG/DL

  APTT (SEC)

Date Value

2005 22 (L)



APTT Patient (Seconds)

Date Value

2020 25



   CREATININE

Date Value

04/10/2020 0.57 mg/dL

2005 0.82 MG/DL



   GLUCOSE

Date Value

04/10/2020 94 mg/dL

2005 90 MG/DL



 CO2 TOTAL

Date Value

04/10/2020 25 mmol/L

2005 25 MMOL/L









Imaging results: Xr Chest 1 Vw



Result Date: 2020

1.  Left lower lobe opacities again seen likely represent combination of 
atelectasis or scarring. 2. A left-sided moderate-sized hiatal hernia is again 
seen.



Xr Chest 1 Vw



Result Date: 3/22/2020

Left lower lobe opacities likely representing atelectasis/scarring. Infection 
cannot be excluded. Moderate-sized hiatal hernia, unchanged. Preliminary Report 
Dictated by Resident: Angel Moran I, Eduardo Rubio MD., have reviewed this 
study and agree with the above report.



Ct Cervical Spine Wo Contrast



Result Date: 3/22/2020

No acute intracranial findings. No acute osseous findings in the cervical spine.



Ct Head Wo Contrast



Result Date: 4/10/2020

Large scalp hematoma along the vertex on the right with interval decrease in 
size in comparison to the prior exam currently measuring 1.3 cm in thickness and
4.9 cm in length. A 0.4 cm hyperdense focusseen in along the skin at the site of
the hematoma inferiorly and may represent foreign body, correlate with physical 
exam. Motion degradation inferiorly. No gross acute intracranial hemorrhage or 
masseffect. Preliminary Report Dictated by Resident: Akosua Terry MD., have reviewed this study and agree with the above report.



Ct Head Wo Contrast



Result Date: 2020

A large, mixed density posterior scalp hematoma with evidence of ongoing 
bleeding is noted. There isno underlying calvarial fracture or acute 
intracranial abnormality.



Ct Head Wo Contrast



Result Date: 3/22/2020

No acute intracranial findings. No acute osseous findings in the cervical spine.



Ct Thorax Wo Contrast



Result Date: 4/10/2020

1. 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 
as part of the transverse colon through the hernia defect. This has resulted in 
atelectasis of the left lower lung.



Xr Foot 3+ Vw Left



Result Date: 3/22/2020

Displaced angulated fracture of the second toe distal P1. Diffuse osteopenia. 
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
Report Dictated by Resident: Willi Laird MD., have reviewed 
this study and agree with the above report.



Xr Foot &lt;3 Vw Left



Result Date: 3/22/2020

Successful reduction of the second toe P1 fracture. Preliminary Report Dictated 
by Resident: Willi Rodriguez MD., have reviewed this study and 
agree with the above report.



DISPOSITION: SNF



CONDITION: Good

FOLLOW-UP:



Cardiology

Surgery

PCP



I spent 60 minutes including a face-to-face visit, discussing the plan of care 
with the patient, patient education regarding medication compliance, exam, and 
coordination of discharge



Electronically Signed by:

Gm Tavarez MD

4/10/2020

4:52 PM

Electronically signed by Gm Tavarez MD at 04/10/2020  5:05 PM CDTdocumented in 
this encounter



Discharge Instructions

InstructionsNancy Mercedes, PAC - 2020Apply a pressure dressing to the 
patient's scalp to keep the bleeding under control. Monitor the patient for 
changes  to her mental status and bring her back for evaluation if she worsens 
or starts having continuous vomiting. Follow up with her PCP for further 
evaluation of her scalp wound in a few days and then again in 7-10 days for 
staple removal.

AttachmentsThe following attachments cannot be sent through Care Everywhere.
Scalp Contusion (English)Head Injury (Adult) (English)Levothyroxine tablets 
(English)Mirtazapine tablets (English)Ciprofloxacin tablets (English)Doxycycline
oral tablets (Periodontitis) (English)documented in this encounter



Progress Notes

Ernesto Delatorre LMSW - 04/10/2020  5:08 PM CDTSocial Work Note



[facility] confirmation no [id number]. Please call  [employer] @ [phone] [facility] 
[facility]

, Care Management

Phone 

Fax 

Electronically signed by Ernesto Delatorre LMSW at 04/10/2020  5:10 PM Stephen Payton RN - 04/10/2020  5:00 PM CDT



Care Management Discharge Disposition Note (DCDN)



5-2-1 Interventions: Clear discharge plan

5-2-1 Providers: Care Manager/

5-2-1 Patient Capacity Improvements: Transportation arrangements



Discharge Plan for ongoing care and services:  Skilled Nursing Facility 
(SNF);Long Term Care (LTC)



Is this a new referral:  Yes



Patient Choice completed for referred services:  Yes



DME location:

Other DME location:

Durable Medical Equipment:



Home Health location:



Discharge location(s):

SNF location: [address] [address] (Ph) 
[phone] (F) [phone]     [place] [address] [place] [id number] (Ph) [phone] (F) [phone]





Patient choice completed for referred services: Yes



Discussed with patient/patients family involved in decision making: Yes

Patient or family caregiver understands, and agrees with discharge plan.



Community resources/referrals made or provided to patient:  No



Resources/Referrals:



Transportation: Wheelchair Van



Mental Status: Alert &amp; Oriented to Person



Living Arrangement: Home

Other living arrangement:

Address of living arrangement: [facility] [place] [address]



Funding Resources: Medicare A &amp; B



Nursing informed of discharge plan: Yes



Name of RN informed: Vannesa



Estimated discharge date:  04/10/20  Time: 1700



Additional Information:  no



CM/SW Name &amp; Contact number: Stephen Lopez RN     .



Stephen Lopez RN, BSN

[facility] Care Manager

O 

F 

The following information has been provided to the facility noted above: reason 
for the patient discharge or transfer; patients physical and psychosocial 
status; summary of care, treatment, servicesprovided to patient; and the patient
progress toward goals.

Electronically signed by Stephen Lopez RN at 04/10/2020  5:00 PM Belinda Coleman MD - 04/10/2020  3:39 PM CDTFormatting of this note might be different 
from the original.

GENERAL SURGERY DAILY PROGRESS NOTE



Patient Name: Sundeep Garza

MRN: [id number]

YOB: 1945



Date: 4/10/2020



24 Hour Events:



No acute events overnight.



Physical Exam:



Vital Signs

Temp:  [36.4 C (97.5 F)-36.9 C (98.5 F)]

Heart Rate (monitor):  [63-88]

Pulse:  []

Resp:  [16-27]

BP: (110-186)/()

MAP (mmHg):  []



Constitutional: Awake, alert, oriented, in no acute distress

Head: Normocephalic, atraumatic

Eyes: Extraocular movements grossly intact, pupils equal and reactive to light 
and accomodation, anicteric sclerae

Ears: Normal external exam

Nose: Normal external exam

Mouth: Moist mucous membranes

Neck: Supple, no jugular venous distention

Respiratory: No respiratory distress

Musculoskeletal: Normal tone and strength, normal range of motion

Vascular: Radial and dorsalis pedis pulses palpable bilaterally

Neurologic: CN II through XII grossly intact, no focal deficits

Skin: Warm and dry, capillary refill &lt;2 seconds, no jaundice, scalp 
laceration without active bleeding, contusion of scalp surrounding the 
laceration, moderate sized hematoma to right anterior shoulder



Labs:



I independently reviewed the patient's labs.



Results for SUNDEEP GARZA (MRN [id number]) as of 4/10/2020 15:40

 Ref. Range 4/10/2020 03:49

WBC x10^3 Latest Ref Range: 4.30 - 11.10 10*3/L 5.41

RBC x10^6 Latest Ref Range: 3.93 - 5.25 10*6/L 3.10 (L)

HGB Latest Ref Range: 11.6 - 15.0 g/dL 9.1 (L)

HCT Latest Ref Range: 35.7 - 45.2 % 27.9 (L)

MCV Latest Ref Range: 80.6 - 95.5 fL 90.0

MCH Latest Ref Range: 25.9 - 32.8 pg 29.4

MCHC Latest Ref Range: 31.6 - 35.1 g/dL 32.6

RDW-SD Latest Ref Range: 39.0 - 49.9 fL 49.8

RDW-CV Latest Ref Range: 12.0 - 15.5 % 14.9

PLT x10^3 Latest Ref Range: 166 - 358 10*3/L 231

MPV Latest Ref Range: 9.5 - 12.9 fL 9.5

NRBC /100 WBC Latest Ref Range: 0.0 - 10.0 /100 WBCs 0.0

NRBC x10^3 Latest Units: 10*3/L &lt;0.01

GRAN MAT (NEUT) % Latest Units: % 73.7

IMM GRAN % Latest Units: % 0.40

LYMPH% Latest Units: % 17.2

MONO % Latest Units: % 7.6

EOS % Latest Units: % 0.4

BASO % Latest Units: % 0.7

GRAN MAT x10^3(ANC) Latest Ref Range: 1.88 - 7.09 10*3/uL 3.99

IMM GRAN x10^3 Latest Ref Range: 0.00 - 0.06 10*3/uL &lt;0.03

LYMPH x10^3 Latest Ref Range: 1.32 - 3.29 10*3/uL 0.93 (L)

MONO x10^3 Latest Ref Range: 0.33 - 0.92 10*3/uL 0.41

EOS x10^3 Latest Ref Range: 0.03 - 0.39 10*3/uL &lt;0.03 (L)

BASO x10^3 Latest Ref Range: 0.01 - 0.07 10*3/uL 0.04

NA Latest Ref Range: 135 - 145 mmol/L 137

K Latest Ref Range: 3.5 - 5.0 mmol/L 3.5

CL Latest Ref Range: 98 - 108 mmol/L 107

CO2 TOTAL Latest Ref Range: 23 - 31 mmol/L 25

AGAP Latest Ref Range: 2 - 16  5

BUN Latest Ref Range: 7 - 23 mg/dL 11

GLUCOSE Latest Ref Range: 70 - 110 mg/dL 94

CREATININE Latest Ref Range: 0.50 - 1.04 mg/dL 0.57

eGFR CALCULATION (non ) Latest Units: mL/min/1.73m2 103.1

eGFR CALCULATION () Latest Units: mL/min/1.73m2 125.0

CALCIUM Latest Ref Range: 8.6 - 10.6 mg/dL 8.3 (L)

MAGNESIUM Latest Ref Range: 1.7 - 2.4 mg/dL 1.8

TROPONIN I Latest Ref Range: &lt;=0.034 ng/mL &lt;0.012

NT-proBNP Latest Ref Range: &lt;=450 pg/mL 774 (H)



Radiology:



CT HEAD WO CONTRAST



HISTORY: 74-year-old female with PMH of dementia and atrial fibrillation

and history of frequent falls, to be evaluated after head trauma and

possible convulsions.



COMPARISON: CT head without contrast 2020



TECHNIQUE:  Noncontrast CT imaging of the head was performed and coronal

and sagittal reconstructions were obtained and reviewed.



FINDINGS:



Mild motion degradation inferiorly.



Large scalp hematoma is noted along the vertex and to the right, has

decreased in size from 2.1 x 4.2 x 5.6 cm (AP by TV by CC) to 1.3 x 4.4 x

4.9 cm (AP by TV by CC). 4 mm hyperdensity inferior aspect of the scalp

hematoma in the dermis may reflect a foreign body, correlate with physical

exam.



Unchanged mildly prominent lateral and third ventricles likely secondary to

volume loss.  No  midline shift or pathological extra-axial fluid

collection is present. The basal cisterns are unremarkable.



There is no gross acute intracranial hemorrhage or significant mass effect.

Scattered periventricular and deep white matter hypoattenuating foci,

nonspecific, can be seen with chronic small vessel disease. Bilateral basal

ganglial calcifications are noted. The gray-white matter differentiation is

preserved.



The mastoid air cells and paranasal air sinuses are not well evaluated due

to motion artifact. No definite calvarial fracture.



IMPRESSION



Large scalp hematoma along the vertex on the right with interval decrease

in size in comparison to the prior exam currently measuring 1.3 cm in

thickness and 4.9 cm in length. A 0.4 cm hyperdense focus seen in along the

skin at the site of the hematoma inferiorly and may represent foreign body,

correlate with physical exam.



Motion degradation inferiorly. No gross acute intracranial hemorrhage or

mass effect.



EXAM: CT scan of the chest without contrast



HISTORY:  Dyspnea, chronic, neg or nondiagnostic xray Shortness of breath

left opacity;



TECHNIQUE:3 mm axial images are obtained from lung apices to the adrenal

glands without intravenous contrast. Sagittal and coronal reformation is

carried out.



COMPARISON: Chest x-ray from 2020



Radiation Dose: DLP of 256 mGy-cm.



FINDINGS:The examination is somewhat limited in quality secondary to

respiratory motion. Mild centrilobular emphysematous changes are seen.

Areas of atelectasis are seen involving the left lower lobe. No focal

consolidation is identified.



The central airway appears to be normal. Mild mucous plugging is seen

involving the left lower lobe bronchi.



No pleural effusion is seen. Mild pleural thickening is seen involving the

lung bases. A calcified pleural plaque is seen in the right lower lobe.



Heart size is normal. Scattered coronary artery calcifications are seen. No

pericardial effusion is noted. Classic three-vessel arch anatomy is seen.

Thoracic aorta is significantly tortuous. Pulmonary artery appears to be

normal in size.



No enlarged lymph nodes are seen in the mediastinum.



A large hiatal hernia is seen with herniation of most of the stomach, small

bowel and transverse colon.



The adrenal glands are normal. A well-circumscribed low-density lesion is

partially visualized in the left kidney consistent with a cyst. Gallbladder

is removed.



Changes of spondylosis are seen in the thoracic spine.



IMPRESSION

1. 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

well as part of the transverse colon through the hernia defect. This has

resulted in atelectasis of the left lower lung.



Medications:



Scheduled Medications

ciprofloxacin HCl 500 mg BID

doxycycline hyclate 100 mg Q12HA2

KCL 20 mEq DAILY

lactobacillus acidophilus 1 tablet QID

[START ON 2020] levothyroxine 100 mcg QAM-0600

magnesium oxide 400 mg BID

metoprolol succinate XL 50 mg DAILY

mirtazapine 15 mg QHS

rivastigmine tartrate 3 mg QAM+PM

vitamin B-12 1,000 mcg DAILY



IV Medications/Drips



PRN Medications

traMADol 50 mg Q6HPRN



Patient Active Problem List

Diagnosis

 Essential hypertension

 Hyperlipemia

 Hypothyroidism

 Iron (Fe) deficiency anemia

 Vitamin D deficiency

 Right arm pain

 S/p reverse total shoulder arthroplasty

 Dizziness

 LOC (loss of consciousness)

 PAF (paroxysmal atrial fibrillation)

 Orthostatic hypotension

 Syncope

 Opacity of lung on imaging study



Assessment: Sundeep Garza is a 74 year old female on Xarelto for atrial 
fibrillation who fellyesterday and sustained a scalp laceration which was 
repaired in the ED. Additionally, she was also found to have a hematoma to her 
right anterior shoulder. She has no evidence of active hemorrhage. Xarelto has 
been held.  Patient is undergoing syncopal work up.



Plan:

1. May wash hair and scalp laceration daily, keep closed laceration covered with
a dressing as the patient tolerates

2. No intervention for right shoulder hematoma

3. Patient may follow up in surgery clinic in 10 to 14 days for staple/suture 
removal



Surgery will sign off, please reconsult as needed



Belinda Aguero M.D.

4/10/2020 15:39Electronically signed by Belinda Aguero MD at 04/10/2020  3:50
PM Stephen Payton RN - 04/10/2020  3:24 PM CDTPt's family [facility]. Clinicals faxed over, awaiting PT eval.



Stephen Lopez RN, BSN

[facility] ADC Care Manager

O 

F [phone] signed by Stephen Lopez RN at 04/10/2020  3:26 
PM Stephen Payton RN - 04/10/2020  2:43 PM CDTRec'd a call from patient's 
son Ryan, who stated they still haven't made a decision for the patient's DC 
plan. Awaiting on family's decision.



Stephen Lopez RN, BSN

[facility] ADC Care Manager

O 

F [phone] signed by Stephen Lopez RN at 04/10/2020  2:49 
PM FREDDYTErnesto Delatorre LMSW - 04/10/2020  1:46 PM CDTCare Management Social 
Functional Assessment

Patient Name: Sundeep Garza     Age: 74 year old     Sex: female     MRN:
[id number]

Patient's Previous Admission Date at [facility]: 2017



Current diagnosis and co-morbidities:

SYNCOPE



Readmission Questions:

Was patient discharged from any acute care hospital within the last 30 days: No



Social Functional Assessment:

Primary language spoken/preferred: English

Mental Status: Alert &amp; Oriented to Person,Place &amp; Time

Information given by: Child

Name and phone number of person giving information: Gera arndt @ [phone] 
(Daughter)

Patient's support system: Child

Name and number of support system: Gera arndt @ [phone] (Daughter)

Primary Care Giver: Self

MPOA: No

Living Arrangement: Home

Address of living arrangement : [facility] [place] [address]

Persons living in home: Self;Child

Names &amp; numbers of persons living in home: Gera arndt @ [phone] 
(Daughter)

Barriers to returning home: None

Baseline functional status- ambulation: Independent

Functional status-baseline personal care: Independent

Baseline functional status- driving: Dependent

Baseline functional status- grocery shopping: Requires minimal to moderate 
assistance

Functional status-baseline housekeeping: Requires minimal to moderate assistance

Functional status-baseline meal prep: Requires minimal to moderate assistance

Current functional status same as prior: No

Current functional status- ambulation: Dependent

Current functional status- personal care: Dependent

Current functional status- driving: Dependent

Current functional status- grocery shopping: Dependent

Current functional status-house keeping: Dependent

Current functional status- meal preparation: Dependent

Do you have a PCP?: Yes

Name of PCP: Davis Wills

[facility]: No

Provider Services: No

DME Company: No

Equipment: Cane;Walker

Hemodialysis: No

Community resources utilized: None

Funding Resources: Medicare A &amp; B

Prescription coverage plan: Commercial

Pharmacy where meds are filled: Other

Other pharmacy: [facility],[place].

Anticipated services prior to disharge: Continue Medical Eval

Expected mode of discharge transportation: Family

Name and phone number of the friend or family member picking up the patient: 
Gera arndt @ [phone] (Daughter)

Additional Recommendations for DC: pending needs

Recommended discharge plan: Home

Have you or family you live with or family that is with you here in the hospital
had:

? Recent history of travel to a high-risk area: no (example  [place], [place])

? Recent sick contacts before or during admission: no

? Contact with a proven COVID-19 case: no

? Symptoms: fever, dry cough, fatigue, difficulty breathing: no

? Attended recent events with a gatherings of &gt; 10 people: no

Do you have at least 30 days of all your medications available? yes

Do you have My Chart set up? no

Patient notified that they may be receiving a call regarding a follow-up visit 
after discharge. No

Visitor policy was reviewed with patient/family. yes

Due to the no visitor policy is there anyone you would like to list that needs 
to be updated regarding your care and plan for discharge. Yes.Gera arndt @ [phone] (Daughter)



Case management has notified patient via telephone of an IMM (Important Message 
from Medicare), which informs the patient of their right to talk to their doctor
about their concerns about discharge, their right to appeal their discharge, and
the process through which they can contact the [employer] 
([employer]) [place]ie at [phone]: an outside reviewer hired by Medicare that reviews 
discharge appeals. Patient verbally agrees to information provided.



No signature collected to reduce exposure during COVID-19 pandemic.



SFA Complete:

Social Functional Assessment complete: Yes



Alcohol Use Screening (AUDIT-C)

How often do you have a drink containing alcohol?: Never

SCORE: 0

Did patient elect to have resources provided: No



Role of Care Management explained.yes

Ernesto Goldsmith LMSW

, Care Management

Phone 

Fax 



Electronically signed by Ernesto Delatorre LMSW at 04/10/2020  2:22 PM Stephen Payton RN - 04/10/2020 12:57 PM CDTAttempted to complete SFA, but no answer. 
Will attempt at later time.



Stephen Lopez RN, BSN

[facility] ADC Care Manager

O 

F 

Electronically signed by Stephen Lopez RN at 04/10/2020 12:58 PM CDT
documented in this encounter



Plan of Treatment







             Date         Type         Specialty    Care Team    Description

 

                2020      Telemedicine Visit Family Medicine Marie Street MD



                                        



                                                                [address] [place] [address]

 [phone] (Fax) 









             Name         Type         Priority     Associated Diagnoses Order S

chedule

 

             TROPONIN I   LAB          Routine                   EVERY 6 HOURS (

START TIME



                                                                 ADJUSTABLE) STA

RT TIME



                                                                 ADJUSTABLE for 

4



                                                                 Occurrences sta

rting



                                                                 04/10/2020 unti

l



                                                                 04/10/2020, 2 c

ompleted

 

             CBC WITH DIFF LAB          Routine                   EVERY MORNING 

AT 0500 for



                                                                 2 Days starting

 04/10/2020



                                                                 until 

0, 1



                                                                 completed

 

             BASIC METABOLIC PANEL LAB          Routine                   EVERY 

MORNING AT 0500 for



             (NA, K, CL, CO2,                                        2 Days star

ting 04/10/2020



             GLUCOSE, BUN,                                        until 20

20, 1



             CREATININE, CA)                                        completed

 

             PROFILE / HEMOGRAM LAB          Routine                   ONCE for 

1 Occurrences



                                                                 starting 04/10/

2020 until



                                                                 04/10/2020









                Health Maintenance Due Date        Last Done       Comments

 

                DTaP,Tdap,and Td Vaccines (1 - Tdap) 1954                 

     

 

                Zoster Recombinant Vaccine (SHINGRIX) (1 1993             

         



                of 2)                                           

 

                Medicare Wellness Visit 2008                      

 

                Osteoporosis Screening 2008                      

 

                PNEUMOCOCCAL VACCINES 65+ (2 of 2 - PCV13) 2020      

 

                INFLUENZA VACCINE Completed       2019, 10/12/2017 



documented as of this encounter



Implants







          Implanted Type      Area       Device    Shelf     Model /



                                                  Identifier Expiration Serial /

 Lot



                                                            Date      

 

          Cement    CEMENT    Right:    Biomet              2018 [id number]

 /



                                        Implanted: Qty: 1 on 2017 by Greg Garcia MD at [facility]            Shoulder                                    6

[id number] /



                                                                      [id number]

 

          Cement    CEMENT    Right:    Biomet              2018 [id number]

1 /



                                        Implanted: Qty: 1 on 2017 by Greg Garcia MD at [facility]            Shoulder                                    6

[id number] /



                                                                      [id number]

 

          Central Screw SCREW     Right:    Biomet              08/10/2027 [id number]

5 /



                                        Implanted: Qty: 1 on 2017 by Greg Garcia MD at [facility]            Shoulder                                    7

[id number] /



                                                                      [id number]

 

          Locking Screw SCREW     Right:    Biomet              2027 25279

0 /



                                        Implanted: Qty: 1 on 2017 by Greg Garcia MD at [facility]            Shoulder                                    4

[id number] /



                                                                      [id number]

 

          Locking Screw SCREW     Right:    Biomet              2027 85845

0 /



                                        Implanted: Qty: 1 on 2017 by Greg Garcia MD at [facility]            Shoulder                                    2

[id number] /



                                                                      [id number]

 

          Nonlocking Screw SCREW     Right:    Biomet              2027 18

0557 /



                                        Implanted: Qty: 1 on 2017 by Greg Garcia MD at [facility]            Shoulder                                    8

[id number] /



                                                                      [id number]

 

          Nonlocking Screw SCREW     Right:    Biomet              2027 18

0557 /



                                        Implanted: Qty: 1 on 2017 by Greg Garcia MD at [facility]            Shoulder                                    4

[id number] /



                                                                      [id number]

 

          Bone Cement Restrictor SHOULDER  Right:    Biomet              

021 [id number] /



                                        Implanted: Qty: 1 on 2017 by Greg Garcia MD at [facility]            Shoulder                                    3

[id number] /



                                                                      [id number]

 

          Humeral Bearing SHOULDER  Right:    Biomet              2022 XL-

[id number] /



                                        Implanted: Qty: 1 on 2017 by Greg Garcia MD at [facility]            Shoulder                                    2

[id number] /



                                                                      [id number]

 

          Humeral Tray With Locking Ring SHOULDER  Right:    Biomet             

 2027 [id number] /



                                        Implanted: Qty: 1 on 2017 by Greg Garcia MD at [facility]            Shoulder                                    2

[id number] /



                                                                      [id number]

 

          Mini Baseplate SHOULDER  Right:    Biomet              2027 0100

[id number] /



                                        Implanted: Qty: 1 on 2017 by Greg Garcia MD at [facility]            Shoulder                                    1

[id number] /



                                                                      [id number]

 

          Glenosphere SHOULDER  Right:    Biomet              2027 [id number] 

/



                                        Implanted: Qty: 1 on 2017 by Greg Garcia MD at [facility]            Shoulder                                    7

[id number] /



                                                                      [id number]

 

          Humeral Fracture Stem Stem      Right:    Biomet              20

27 [id number] /



                                        Implanted: Qty: 1 on 2017 by Gerg Garcia MD at [facility]            Shoulder                                    4

[id number] /



                                                                      [id number]



documented as of this encounter



Procedures







             Procedure Name Priority     Date/Time    Associated   Comments



                                                    Diagnosis    

 

             CAROTID DUPLEX Routine      04/10/2020 10:55              



             BILATERAL BY VASCULAR              AM CDT                    



             LAB                                                 

 

             CT THORAX WO CONTRAST ASAP         04/10/2020  8:33 Opacity of lung

 on Results for this



                                       AM CDT       imaging study procedure are 

in



                                                                 the results



                                                                 section.

 

             CT HEAD WO CONTRAST Routine      04/10/2020  8:33 Injury of head, R

esults for this



                                       AM CDT       initial encounter procedure 

are in



                                                                 the results



                                                                 section.

 

             TROPONIN I   Routine      04/10/2020  6:27              Results for

 this



                                       AM CDT                    procedure are i

n



                                                                 the results



                                                                 section.

 

             CBC WITH DIFFERENTIAL Routine      04/10/2020  3:49              Re

sults for this



                                       AM CDT                    procedure are i

n



                                                                 the results



                                                                 section.

 

             N-TERMINAL PRO-BNP Routine      04/10/2020  3:49              Resul

ts for this



                                       AM CDT                    procedure are i

n



                                                                 the results



                                                                 section.

 

             CBC WITH DIFFERENTIAL Routine      04/10/2020  3:49              Re

sults for this



                                       AM CDT                    procedure are i

n



                                                                 the results



                                                                 section.

 

             BASIC METABOLIC PANEL Routine      04/10/2020  3:49              Re

sults for this



             (NA, K, CL, CO2,              AM CDT                    procedure a

re in



             GLUCOSE, BUN,                                        the results



             CREATININE, CA)                                        section.

 

             TROPONIN I   ASAP Add-On  04/10/2020  3:49              Results for

 this



                                       AM CDT                    procedure are i

n



                                                                 the results



                                                                 section.

 

             MAGNESIUM    Routine      04/10/2020  3:49              Results for

 this



                                       AM CDT                    procedure are i

n



                                                                 the results



                                                                 section.

 

             PROCALCITONIN ASAP         04/10/2020  1:13              Results fo

r this



                                       AM CDT                    procedure are i

n



                                                                 the results



                                                                 section.

 

             TROPONIN I   Routine      04/10/2020  1:13              Results for

 this



                                       AM CDT                    procedure are i

n



                                                                 the results



                                                                 section.

 

             CORONAVIRUS COVID-19 STAT         2020  8:36 LOC (loss of Res

ults for this



             TESTING                   PM CDT       consciousness) procedure are

 in



                                                                 the results



                                                                 section.

 

             URINALYSIS   STAT         2020  8:35 Fall, initial Results fo

r this



                                                PM CDT          encounter



                                        procedure are in



                                                    Injury of head, the results



                                                                initial encounte

r



                                        section.



                                                    Contusion of 



                                                    scalp, initial 



                                                                encounter



                                        



                                                    LOC (loss of 



                                                    consciousness) 

 

             CBC WITH DIFFERENTIAL STAT         2020  7:24 Fall, initial R

esults for this



                                                PM CDT          encounter



                                        procedure are in



                                                    Injury of head, the results



                                                                initial encounte

r



                                        section.



                                                    Contusion of 



                                                    scalp, initial 



                                                                encounter



                                        



                                                    LOC (loss of 



                                                    consciousness) 

 

             N-TERMINAL PRO-BNP Add-on       2020  7:24              Resul

ts for this



                                       PM CDT                    procedure are i

n



                                                                 the results



                                                                 section.

 

             CBC WITH DIFFERENTIAL STAT         2020  7:24 Fall, initial R

esults for this



                                                PM CDT          encounter



                                        procedure are in



                                                    Injury of head, the results



                                                                initial encounte

r



                                        section.



                                                    Contusion of 



                                                    scalp, initial 



                                                                encounter



                                        



                                                    LOC (loss of 



                                                    consciousness) 

 

             COMP. METABOLIC PANEL STAT         2020  7:24 Fall, initial R

esults for this



                (06612)                         PM CDT          encounter



                                        procedure are in



                                                    Injury of head, the results



                                                                initial encounte

r



                                        section.



                                                    Contusion of 



                                                    scalp, initial 



                                                                encounter



                                        



                                                    LOC (loss of 



                                                    consciousness) 

 

             THYROID STIMULATING Add-on       2020  7:24              Resu

lts for this



             HORMONE                   PM CDT                    procedure are i

n



                                                                 the results



                                                                 section.

 

             TROPONIN I   STAT         2020  7:24 Fall, initial Results fo

r this



                                                PM CDT          encounter



                                        procedure are in



                                                    Injury of head, the results



                                                                initial encounte

r



                                        section.



                                                    Contusion of 



                                                    scalp, initial 



                                                                encounter



                                        



                                                    LOC (loss of 



                                                    consciousness) 

 

             MAGNESIUM    ASAP Add-On  2020  7:24              Results for

 this



                                       PM CDT                    procedure are i

n



                                                                 the results



                                                                 section.

 

             CREATINE KINASE Add-on       2020  7:24              Results 

for this



                                       PM CDT                    procedure are i

n



                                                                 the results



                                                                 section.

 

             PHOSPHORUS   ASAP Add-On  2020  7:24              Results for

 this



                                       PM CDT                    procedure are i

n



                                                                 the results



                                                                 section.

 

             EKG-12 LEAD  Routine      2020  7:23              



                                       PM CDT                    

 

             XR CHEST 1 VW STAT         2020  7:22 Fall, initial Results f

or this



                                                PM CDT          encounter



                                        procedure are in



                                                    Injury of head, the results



                                                                initial encounte

r



                                        section.



                                                    Contusion of 



                                                    scalp, initial 



                                                                encounter



                                        



                                                    LOC (loss of 



                                                    consciousness) 

 

             EKG-12 LEAD  Routine      2020  7:11              



                                       PM CDT                    

 

             CT LAYR CLOS WND Routine      2020  3:59              Results

 for this



             TRUNK,ARM,LEG <2.5 CM              PM CDT                    proced

ure are in



                                                                 the results



                                                                 section.

 

             CT HEAD WO CONTRAST STAT         2020  2:42 Fall, initial Res

ults for this



                                       PM CDT       encounter    procedure are i

n



                                                                 the results



                                                                 section.

 

             CT CERVICAL SPINE WO STAT         2020  2:42 Fall, initial Re

sults for this



             CONTRAST                  PM CDT       encounter    procedure are i

n



                                                                 the results



                                                                 section.



documented in this encounter



Results

CT THORAX WO CONTRAST (04/10/2020  8:33 AM CDT)





                                        Specimen

 

                                        









                          Impressions               Performed At

 

                          1. 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

mach small bowel 

PACS/VR/DOSE



                                        as



                                        



                                        well as part of the transverse colon thr

ough the hernia defect. This has



                                        



                                        resulted in atelectasis of the left lowe

r lung.



                                        



                                         



                                        



                                                    









                          Narrative                 Performed At

 

                                        EXAM: CT scan of the chest without contr

ast



                                        PACS/VR/DOSE



                                         



                                        



                          HISTORY: Dyspnea, chronic, neg or nondiagnostic xray

 Shortness of 



                                        breath



                                        



                                        left opacity;



                                        



                                         



                                        



                                        TECHNIQUE:3 mm axial images are obtained

 from lung apices to the adrenal



                                        



                                        glands without intravenous contrast. Sag

ittal and coronal reformation is



                                        



                                        carried out.



                                        



                                         



                                        



                                        COMPARISON: Chest x-ray from 2020



                                        



                                         



                                        



                                        Radiation Dose: DLP of 256 mGy-cm. 



                                        



                                         



                                        



                                        FINDINGS:The examination is somewhat wallace

ited in quality secondary to



                                        



                                        respiratory motion. Mild centrilobular e

mphysematous changes are seen.



                                        



                                        Areas of atelectasis are seen involving 

the left lower lobe. No focal



                                        



                                        consolidation is identified.



                                        



                                         



                                        



                                        The central airway appears to be normal.

 Mild mucous plugging is seen



                                        



                                        involving the left lower lobe bronchi.



                                        



                                         



                                        



                          No pleural effusion is seen. Mild pleural thickening i

s seen involving 



                                        the



                                        



                                        lung bases. A calcified pleural plaque i

s seen in the right lower lobe.



                                        



                                         



                                        



                          Heart size is normal. Scattered coronary artery calcif

ications are seen. 



                                        No



                                        



                          pericardial effusion is noted. Classic three-vessel ar

ch anatomy is 



                                        seen.



                                        



                                        Thoracic aorta is significantly tortuous

. Pulmonary artery appears to be



                                        



                                        normal in size.



                                        



                                         



                                        



                                        No enlarged lymph nodes are seen in the 

mediastinum.



                                        



                                         



                                        



                          A large hiatal hernia is seen with herniation of most 

of the stomach, 



                                        small



                                        



                                        bowel and transverse colon.



                                        



                                         



                                        



                          The adrenal glands are normal. A well-circumscribed lo

w-density lesion 



                                        is



                                        



                          partially visualized in the left kidney consistent wit

h a cyst. 



                                        Gallbladder



                                        



                                        is removed.



                                        



                                         



                                        



                                        Changes of spondylosis are seen in the t

horacic spine.



                                        



                                                    









                                        Procedure Note

 

                                        [facility], Radiant Results Inft User - 04/10/

2020  9:55 AM CDT



EXAM: CT scan of the chest without contrast



                                        



                                        HISTORY:  Dyspnea, chronic, neg or nondi

agnostic xray Shortness of breath



                                        left opacity;



                                        



                                        TECHNIQUE:3 mm axial images are obtained

 from lung apices to the adrenal



                                        glands without intravenous contrast. Sag

ittal and coronal reformation is



                                        carried out.



                                        



                                        COMPARISON: Chest x-ray from 2020



                                        



                                        Radiation Dose: DLP of 256 mGy-cm.



                                        



                                        FINDINGS:The examination is somewhat wallace

ited in quality secondary to



                                        respiratory motion. Mild centrilobular e

mphysematous changes are seen.



                                        Areas of atelectasis are seen involving 

the left lower lobe. No focal



                                        consolidation is identified.



                                        



                                        The central airway appears to be normal.

 Mild mucous plugging is seen



                                        involving the left lower lobe bronchi.



                                        



                                        No pleural effusion is seen. Mild pleura

l thickening is seen involving the



                                        lung bases. A calcified pleural plaque i

s seen in the right lower lobe.



                                        



                                        Heart size is normal. Scattered coronary

 artery calcifications are seen. No



                                        pericardial effusion is noted. Classic t

hree-vessel arch anatomy is seen.



                                        Thoracic aorta is significantly tortuous

. Pulmonary artery appears to be



                                        normal in size.



                                        



                                        No enlarged lymph nodes are seen in the 

mediastinum.



                                        



                                        A large hiatal hernia is seen with herni

ation of most of the stomach, small



                                        bowel and transverse colon.



                                        



                                        The adrenal glands are normal. A well-ci

rcumscribed low-density lesion is



                                        partially visualized in the left kidney 

consistent with a cyst. Gallbladder



                                        is removed.



                                        



                                        Changes of spondylosis are seen in the t

horacic spine.



                                        



                                        IMPRESSION



                                        1. Type IV diaphragmatic hernia with her

niation of stomach small bowel as



                                        well as part of the transverse colon thr

ough the hernia defect. This has



                                        resulted in atelectasis of the left lowe

r lung.









                Performing Organization Address         City/State/Zipcode Phone

 Number

 

                PACS/VR/DOSE                                    



CT HEAD WO CONTRAST (04/10/2020  8:33 AM CDT)





                                        Specimen

 

                                        









                          Impressions               Performed At

 

                                         



                                        PACS/VR/DOSE



                          Large scalp hematoma along the vertex on the right wit

h interval 



                                        decrease



                                        



                                        in size in comparison to the prior exam 

currently measuring 1.3 cm in



                                        



                          thickness and 4.9 cm in length. A 0.4 cm hyperdense fo

cus seen in along 



                                        the



                                        



                          skin at the site of the hematoma inferiorly and may re

present foreign 



                                        body,



                                        



                                        correlate with physical exam. 



                                        



                                         



                                        



                                        Motion degradation inferiorly. No gross 

acute intracranial hemorrhage or



                                        



                                        mass effect. 



                                        



                                         



                                        



                                        Preliminary Report Dictated by Resident:

 Annie Terry MD., have reviewe

d this study and agree with the



                                        



                          above report.             









                          Narrative                 Performed At

 

                                        CT HEAD WO CONTRAST



                                        PACS/VR/DOSE



                                         



                                        



                                        HISTORY: 74-year-old female with PMH of 

dementia and atrial fibrillation



                                        



                                        and history of frequent falls, to be ventura

luated after head trauma and



                                        



                                        possible convulsions.



                                        



                                         



                                        



                                        COMPARISON: CT head without contrast 



                                        



                                         



                                        



                          TECHNIQUE: Noncontrast CT imaging of the head was pe

rformed and 



                                        coronal



                                        



                                        and sagittal reconstructions were obtain

ed and reviewed.



                                        



                                         



                                        



                                        FINDINGS:



                                        



                                         



                                        



                                        Mild motion degradation inferiorly. 



                                        



                                         



                                        



                                        Large scalp hematoma is noted along the 

vertex and to the right, has



                                        



                          decreased in size from 2.1 x 4.2 x 5.6 cm (AP by TV by

 CC) to 1.3 x 4.4 



                                        x



                                        



                                        4.9 cm (AP by TV by CC). 4 mm hyperdensi

ty inferior aspect of the scalp



                                        



                          hematoma in the dermis may reflect a foreign body, cor

relate with 



                                        physical



                                        



                                        exam. 



                                        



                                         



                                        



                          Unchanged mildly prominent lateral and third ventricle

s likely secondary 



                                        to



                                        



                                        volume loss. No midline shift or pat

hological extra-axial fluid



                                        



                                        collection is present. The basal cistern

s are unremarkable.



                                        



                                         



                                        



                          There is no gross acute intracranial hemorrhage or sig

nificant mass 



                                        effect.



                                        



                                        Scattered periventricular and deep white

 matter hypoattenuating foci,



                                        



                          nonspecific, can be seen with chronic small vessel dis

ease. Bilateral 



                                        basal



                                        



                          ganglial calcifications are noted. The gray-white juan

er differentiation 



                                        is



                                        



                                        preserved.



                                        



                                         



                                        



                          The mastoid air cells and paranasal air sinuses are no

t well evaluated 



                                        due



                                        



                                        to motion artifact. No definite calvaria

l fracture. 



                                        



                                                    









                                        Procedure Note

 

                                        [facility], Radiant Results In[facility] User - 04/10/

2020  9:26 AM CDT



CT HEAD WO CONTRAST



                                        



                                        HISTORY: 74-year-old female with PMH of 

dementia and atrial fibrillation



                                        and history of frequent falls, to be ventura

luated after head trauma and



                                        possible convulsions.



                                        



                                        COMPARISON: CT head without contrast 



                                        



                                        TECHNIQUE:  Noncontrast CT imaging of th

e head was performed and coronal



                                        and sagittal reconstructions were obtain

ed and reviewed.



                                        



                                        FINDINGS:



                                        



                                        Mild motion degradation inferiorly.



                                        



                                        Large scalp hematoma is noted along the 

vertex and to the right, has



                                        decreased in size from 2.1 x 4.2 x 5.6 c

m (AP by TV by CC) to 1.3 x 4.4 x



                                        4.9 cm (AP by TV by CC). 4 mm hyperdensi

ty inferior aspect of the scalp



                                        hematoma in the dermis may reflect a for

eign body, correlate with physical



                                        exam.



                                        



                                        Unchanged mildly prominent lateral and t

hird ventricles likely secondary to



                                        volume loss.  No  midline shift or patho

logical extra-axial fluid



                                        collection is present. The basal cistern

s are unremarkable.



                                        



                                        There is no gross acute intracranial hem

orrhage or significant mass effect.



                                        Scattered periventricular and deep white

 matter hypoattenuating foci,



                                        nonspecific, can be seen with chronic sm

all vessel disease. Bilateral basal



                                        ganglial calcifications are noted. The g

ray-white matter differentiation is



                                        preserved.



                                        



                                        The mastoid air cells and paranasal air 

sinuses are not well evaluated due



                                        to motion artifact. No definite calvaria

l fracture.



                                        



                                        IMPRESSION



                                        



                                        Large scalp hematoma along the vertex on

 the right with interval decrease



                                        in size in comparison to the prior exam 

currently measuring 1.3 cm in



                                        thickness and 4.9 cm in length. A 0.4 cm

 hyperdense focus seen in along the



                                        skin at the site of the hematoma inferio

rly and may represent foreign body,



                                        correlate with physical exam.



                                        



                                        Motion degradation inferiorly. No gross 

acute intracranial hemorrhage or



                                        mass effect.



                                        



                                        Preliminary Report Dictated by Resident:

 Annie Terry MD., have reviewed

 this study and agree with the



                                        above report.









                Performing Organization Address         City/State/Zipcode Phone

 Number

 

                PACS/VR/DOSE                                    



TROPONIN I (04/10/2020  6:27 AM CDT)





             Component    Value        Ref Range    Performed At Pathologist Sig

nature

 

             TROPONIN I   <0.012       <=0.034 ng/mL [facility] 



                                                    LABORATORY   









                                        Specimen

 

                                        Blood - LINE, VENOUS









                          Narrative                 Performed At

 

                                        Equal or Less than 0.034 ng/ml---Normal 





                                        [facility]



                          Note: Cardiac troponin begins to rise 3-4 hours 



                          after the onset of ischemia. Repeat in 4-6 hours 



                          if the sample was drawn within 3-4 hours of the 



                                        onset of the symptom and found normal. 



                                        



                                         



                                        



                          Between 0.035 and 0.120 ng/mL--- Borderline. 



                                        Questionable myocardial injury or necros

is  



                                        



                          Note: Serial measurement may be necessary to 



                          confirm or exclude the diagnosis of myocardial 



                          injury or necrosis; Clinical correlation 



                          (symptoms, EKGs, imaging studies, and others) 



                          required; Repeat in 4-6 hours if clinically 



                                        indicated.    



                                        



                                         



                                        



                          Equal or Higher than 0.121 ng/mL---Abnormal. 



                          Myocardial Injury or Necrosis Likely     



                                        



                                        



                                         



                                        



                          Biotin has been reported to cause a negative 



                          bias, interpret results relative to patient's 



                          use of biotin.            



                                                   



                                        



                                           



                                                   



                                        



                                                    









                Performing Organization Address         City/[facility] Phone

 Number

 

                [facility] CLIA: [id number], [address] [address]

15 [phone]



                LABORATORY      [address]                  



TROPONIN I (04/10/2020  3:49 AM CDT)





             Component    Value        Ref Range    Performed At [facility]

nature

 

             TROPONIN I   <0.012       <=0.034 ng/mL [facility] 



                                                    LABORATORY   









                                        Specimen

 

                                        Blood - LINE, VENOUS









                          Narrative                 Performed At

 

                                        Equal or Less than 0.034 ng/ml---Normal 





                                        [facility] LABORATORY



                          Note: Cardiac troponin begins to rise 3-4 hours 



                          after the onset of ischemia. Repeat in 4-6 hours 



                          if the sample was drawn within 3-4 hours of the 



                                        onset of the symptom and found normal. 



                                        



                                         



                                        



                          Between 0.035 and 0.120 ng/mL--- Borderline. 



                                        Questionable myocardial injury or necros

is  



                                        



                          Note: Serial measurement may be necessary to 



                          confirm or exclude the diagnosis of myocardial 



                          injury or necrosis; Clinical correlation 



                          (symptoms, EKGs, imaging studies, and others) 



                          required; Repeat in 4-6 hours if clinically 



                                        indicated.    



                                        



                                         



                                        



                          Equal or Higher than 0.121 ng/mL---Abnormal. 



                          Myocardial Injury or Necrosis Likely     



                                        



                                        



                                         



                                        



                          Biotin has been reported to cause a negative 



                          bias, interpret results relative to patient's 



                          use of biotin.            



                                                   



                                        



                                           



                                                   



                                        



                                                    









                Performing Organization Address         City/[facility]/Zipcode Phone

 Number

 

                [facility] CLIA: [id number], [address] [address]

15 [phone]



                LABORATORY      [address]                  



CBC WITH DIFFERENTIAL (04/10/2020  3:49 AM CDT)





             Component    Value        Ref Range    Performed At [facility]

[facility]

 

             WBC          5.41         4.30 - 11.10 [facility] 



                                       10*3/L     HOSPITAL LABORATORY 

 

             RBC          3.10 (L)     3.93 - 5.25  [facility] 



                                       10*6/L     HOSPITAL LABORATORY 

 

             HGB          9.1 (L)      11.6 - 15.0  [facility] 



                                       g/dL         HOSPITAL LABORATORY 

 

             HCT          27.9 (L)     35.7 - 45.2 % [facility] LABORATORY 

 

             MCV          90.0         80.6 - 95.5 fL [facility] LABORATORY 

 

             MCH          29.4         25.9 - 32.8 pg [facility] LABORATORY 

 

             MCHC         32.6         31.6 - 35.1  [facility] 



                                       g/dL         \[facility]\"" LABORATORY 

 

             RDW-SD       49.8         39.0 - 49.9 fL [facility] LABORATORY 

 

             RDW-CV       14.9         12.0 - 15.5 % [facility] LABORATORY 

 

             PLT          231          166 - 358    [facility] 



                                       10*3/L     \[facility]\"" LABORATORY 

 

             MPV          9.5          9.5 - 12.9 fL [facility] LABORATORY 

 

             NRBC/100 WBC 0.0          0.0 - 10.0 /100 [facility] 



                                       WBCs         \[facility]\"" LABORATORY 

 

             NRBC x10^3   <0.01        10*3/L     [facility] LABORATORY 

 

             GRAN MAT (NEUT) % 73.7         %            [facility] LABORATORY 

 

             IMM GRAN %   0.40         %            [facility] LABORATORY 

 

             LYMPH %      17.2         %            [facility] LABORATORY 

 

             MONO %       7.6          %            [facility] LABORATORY 

 

             EOS %        0.4          %            [facility] LABORATORY 

 

             BASO %       0.7          %            [facility] LABORATORY 

 

             GRAN MAT x10^3(ANC) 3.99         1.88 - 7.09  [facility] 



                                       10*3/uL      HOSPITAL LABORATORY 

 

             IMM GRAN x10^3 <0.03        0.00 - 0.06  [facility] 



                                       10*3/uL      HOSPITAL LABORATORY 

 

             LYMPH x10^3  0.93 (L)     1.32 - 3.29  [facility] 



                                       10*3/uL      HOSPITAL LABORATORY 

 

             MONO x10^3   0.41         0.33 - 0.92  [facility] 



                                       10*3/uL      HOSPITAL LABORATORY 

 

             EOS x10^3    <0.03 (L)    0.03 - 0.39  [facility] 



                                       10*3/uL      HOSPITAL LABORATORY 

 

             BASO x10^3   0.04         0.01 - 0.07  [facility] 



                                       10*3/uL      HOSPITAL LABORATORY 









                                        Specimen

 

                                        Blood - LINE, VENOUS









                Performing Organization Address         City/State/Zipcode Phone

 Number

 

                [facility] CLIA: [id number], [address], 

15 [phone]



                LABORATORY      [address]                  



MAGNESIUM (04/10/2020  3:49 AM CDT)





             Component    Value        Ref Range    Performed At [facility]

nature

 

             MAGNESIUM    1.8          1.7 - 2.4 mg/dL [facility]

 



                                                    LABORATORY   









                                        Specimen

 

                                        Blood - LINE, VENOUS









                Performing Organization Address         City/[facility] Phone

 Number

 

                [facility] CLIA: [id number], [address], [place] [address]

15 [phone]



                LABORATORY      [address]                  



N-TERMINAL PRO-BNP (04/10/2020  3:49 AM CDT)





             Component    Value        Ref Range    Performed At [facility]

nature

 

             NT-proBNP    774 (H)      <=450 pg/mL  [facility] 



                                                    LABORATORY   









                                        Specimen

 

                                        Blood - LINE, VENOUS









                          Narrative                 Performed At

 

                          Biotin has been reported to cause a negative [facility] 

LABORATORY



                          bias, interpret results relative to patient's 



                          use of biotin.            









                Performing Organization Address         City/[facility] Phone

 Number

 

                [facility] CLIA: [id number], [address], 

15 [phone]



                LABORATORY      [address]                  



BASIC METABOLIC PANEL (NA, K, CL, CO2, GLUCOSE, BUN, CREATININE, CA) (04/10/2020
 3:49 AM CDT)





             Component    Value        Ref Range    Performed At [facility]

nature

 

             NA           137          135 - 145    [facility] 



                                       mmol/L       \[facility]\"" LABORATORY 

 

             K            3.5          3.5 - 5.0    [facility] 



                                       mmol/L       \[facility]\"" LABORATORY 

 

             CL           107          98 - 108 mmol/L [facility] LABORATORY 

 

             CO2 TOTAL    25           23 - 31 mmol/L [facility] LABORATORY 

 

             AGAP         5            2 - 16       [facility] LABORATORY 

 

             BUN          11           7 - 23 mg/dL [facility] LABORATORY 

 

             GLUCOSE      94           70 - 110 mg/dL [facility] LABORATORY 

 

             CREATININE   0.57         0.50 - 1.04  [facility] 



                                       mg/dL        \[facility]\"" LABORATORY 

 

             CALCIUM      8.3 (L)      8.6 - 10.6   [facility] 



                                       mg/dL        \[facility]\"" LABORATORY 

 

             eGFR Calculation 103.1        mL/min/1.73m2 [facility] 



             (Non-[facility] LABORATORY 



             American)                                           

 

             eGFR Calculation 125.0        mL/min/1.73m2 [facility] 



             (African American)                           \[facility]\"" LABORATORY 









                                        Specimen

 

                                        Blood - LINE, VENOUS









                          Narrative                 Performed At

 

                          [facility] of Glomerular Filtration Rate (GFR) [facility] 

LABORATORY



                                        and Staging of Kidney Disease*



                                        



                                         



                                        



                          +-----------------------+---------------------+- 



                                        ------------------------+



                                        



                          | GFR (mL/min/1.73 m2) | With Kidney Damage 



                                        | Without Kidney Damage



                                        



                          +-----------------------+---------------------+- 



                                        ------------------------+



                                        



                          | >90         | Stage one 



                              |  Normal        



                                        



                                        



                          +-----------------------+---------------------+- 



                                        ------------------------+



                                        



                          | 60-89        | Stage two 



                                            |  Decreased GFR   

  



                                        



                          +-----------------------+---------------------+- 



                                        ------------------------+



                                        



                          | 30-59        | Stage three 



                                           |  Stage three     

 



                                        



                          +-----------------------+---------------------+- 



                                        ------------------------+



                                        



                          | 15-29        | Stage four 



                                            |  Stage four     

 



                                        



                          +-----------------------+---------------------+- 



                                        ------------------------+



                                        



                          | <15 (or dialysis)  | Stage five   



                                          |  Stage five      



                                        



                          +-----------------------+---------------------+- 



                                        ------------------------+



                                        



                                         



                                        



                          *Each stage assumes the associated GFR level has 



                          been in effect for at least three months. 



                          Stages 1 to 5, with or without kidney disease, 



                                        indicate chronic kidney disease.



                                        



                                         



                                        



                          Notes: Determination of stages one and two (with 



                          eGFR >59mL/min/1.73 m2) requires estimation of 



                          kidney damage for at least three months as 



                          defined by structural or functional 



                          abnormalities of the kidney, manifested by 



                                        either:



                                        



                          Pathological abnormalities or Markers of kidney 



                          damage (including abnormalities in the 



                          composition of the blood or urine or 



                                        abnormalities in imaging tests). 



                                        



                                                    









                Performing Organization Address         City/State/Zipcode Phone

 Number

 

                [facility] CLIA: [id number], [address], 

15 [phone]



                LABORATORY      [address]                  



PROCALCITONIN (04/10/2020  1:13 AM CDT)





             Component    Value        Ref Range    Performed At [facility]

valeria

 

             Procalcitonin 0.02         <0.07 ng/mL  [facility] LABORATORY SERVICES 









                                        Specimen

 

                                        Blood - LINE, VENOUS









                          Narrative                 Performed At

 

                          INTERPRETATION OF PROCALCITONIN RESULTS IN ADULTS >= 1

8 [facility] LABORATORY SERVICES



                                        YEARS OF AGE



                                        



                                         



                                        



                          Initiation and discontinuation of antibiotics on patie

nts 



                          with suspected or confirmed Lower Respiratory Tract 



                                        Infection in Adults >= 18 years of age.



                                        



                                         



                                        



                          +--------------+----------------+---------------+-----

------ 



                                        -----------------+



                                        



                          |Procalcitonin |Interpretation |Antibiotic   



                                        |Considerations        



                                        



                          |ng/mL     |        |recommend

ation 



                                        |             

  



                                        



                          +--------------+----------------+---------------+-----

------ 



                                        -----------------+



                                        



                          | <0.1     | Bacterial   | Strongly  

 | 



                                                     

 



                                        



                          |       | infection very | discouraged 

 | 



                                        Overruling:         



                                        



                          |       | unlikely    |    

  



                                          |  Clinically unstable    



                                        



                          +--------------+----------------+---------------+  H

igh 



                                        risk for adverse   



                                        



                          | <0.25    | Bacterial   | Discouraged 

 |  



                                        outcome          



                                        



                          |       | infection   |    

  



                                          |  SEE IMPORTANT NOTE    





                                        



                          |       | unlikely    |    

  



                                          |           

    



                                        



                          +--------------+----------------+---------------+-----

------ 



                                        -----------------+



                                        



                          | >=0.25    | Bacterial   | Encouraged  

|  



                                                     



                                        



                          |       | infection   |    

  



                                          |           

    



                                        



                          |       | likely     |    

  



                                          | Consider treatment failure 



                                        



                          +--------------+----------------+---------------+ if l

evels 



                                        does not decrease 



                                        



                          | >0.5     | Bacterial   | Strongly  

 | 



                                        appropriately        



                                        



                          |       | infection very | encouraged 

 | 



                                                     

 



                                        



                          |       | likely     |    

  



                                          |           

    



                                        



                          +--------------+----------------+---------------+-----

------ 



                                        -----------------+



                                        



                                         



                                        



                          Discontinuation of antibiotics in high-acuity patients

 with 



                          suspected or confirmed sepsis in Adults &gt;= 18 years

 of 



                                        age.



                                        



                                         



                                        



                          +--------------+----------------+---------------+-----

------ 



                                        -----------------+



                                        



                          |Procalcitonin |Interpretation |Antibiotic   



                                        |Considerations        



                                        



                          |ng/mL     |        |recommend

ation 



                                        |             

  



                                        



                          +--------------+----------------+---------------+-----

------ 



                                        -----------------+



                                        



                          | <0.25    | Bacterial   | Strongly  

 | 



                                                     

 



                                        



                          |       | infection very | discouraged 

 | 



                                        Overruling:         



                                        



                          |       | unlikely    |    

  



                                          |  Clinically unstable    



                                        



                          +--------------+----------------+---------------+  H

igh 



                                        risk for adverse   



                                        



                          | <0.5 or drop | Bacterial   | Discouraged  | 

 



                                        outcome          



                                        



                          | >80% from  | infection   |      

  



                                        |  SEE IMPORTANT NOTE    



                                        



                          | highest PCT | unlikely    |      

  | 



                                                     

 



                                        



                          | level    |        |   

   



                                          |           

    



                                        



                          +--------------+----------------+---------------+-----

------ 



                                        -----------------+



                                        



                          | >=0.5    | Bacterial   | Encouraged 

 | 



                                                     

 



                                        



                          |       | infection   |    

  



                                          |           

    



                                        



                          |       | likely     |    

  



                                          | Consider treatment failure 



                                        



                          +--------------+----------------+---------------+ if l

evels 



                                        does not decrease 



                                        



                          | >1.0     | Bacterial   | Strongly  

 | 



                                        appropriately        



                                        



                          |       | infection very | encouraged 

 | 



                                                     

 



                                        



                          |       | likely     |    

  



                                          |           

    



                                        



                          +--------------+----------------+---------------+-----

------ 



                                        -----------------+



                                        



                                         



                                        



                          Percentage of drop of Procalcitonin calculation for 



                          Discontinuation of antibiotics in high-acuity patients

 with 



                                        suspected or confirmed sepsis in Adults 

>= 18 years of age.



                                        



                                         



                                        



                                      Procalcitonin 



                                        highest{}-Procalcitonin current{}



                                        



                          Delta Procalcitonin =     



                                        ________________________________________

_______ x100% 



                                        



                                           



                                        Procalcitonin current {}



                                        



                                         



                                        



                          IMPORTANT NOTE: Procalcitonin may be elevated without 



                          bacterial infection by physiologic stress related to t

rauma, 



                          burns, chronic dialysis, metastatic cancer, surgery in

 the 



                          past seven days, malaria, some fungal infections, and 

some 



                          forms of vasculitis. The interpretation algorithm may 

not 



                          apply to patients with immunosuppression (equivalent o

f >10 



                          mg of prednisone daily), HIV with CD4 cell count &lt; 

350 



                          cells/mm3, active malignancy on systemic chemotherapy,

 solid 



                          organ transplant or hematopoietic stem cell transplant

ation, 



                          or hospital acquired pneumonia. Additionally, some cli

nical 



                          trials of procalcitonin have excluded patients with sh

ock 



                          requiring vasopressor use, acute respiratory failure 



                          requiring mechanical ventilation, or those with known 

lung 



                                        abscess/empyema.



                                        



                                         



                                        



                                        For further information please refer to:



                                        



                          [email or web address] 



                          [email or web address]                      









                Performing Organization Address         City/State/Zipcode Phone

 Number

 

                [facility] LABORATORY SERVICES CLIA:  [id number], [address] [address]

[phone]



                                [facility]                 



TROPONIN I (04/10/2020  1:13 AM CDT)





             Component    Value        Ref Range    Performed At [facility]

nature

 

             TROPONIN I   <0.012       <=0.034 ng/mL [facility] 



                                                    LABORATORY   









                                        Specimen

 

                                        Blood - LINE, VENOUS









                          Narrative                 Performed At

 

                                        Equal or Less than 0.034 ng/ml---Normal 





                                        [facility] LABORATORY



                          Note: Cardiac troponin begins to rise 3-4 hours 



                          after the onset of ischemia. Repeat in 4-6 hours 



                          if the sample was drawn within 3-4 hours of the 



                                        onset of the symptom and found normal. 



                                        



                                         



                                        



                          Between 0.035 and 0.120 ng/mL--- Borderline. 



                                        Questionable myocardial injury or necros

is  



                                        



                          Note: Serial measurement may be necessary to 



                          confirm or exclude the diagnosis of myocardial 



                          injury or necrosis; Clinical correlation 



                          (symptoms, EKGs, imaging studies, and others) 



                          required; Repeat in 4-6 hours if clinically 



                                        indicated.    



                                        



                                         



                                        



                          Equal or Higher than 0.121 ng/mL---Abnormal. 



                          Myocardial Injury or Necrosis Likely     



                                        



                                        



                                         



                                        



                          Biotin has been reported to cause a negative 



                          bias, interpret results relative to patient's 



                          use of biotin.            



                                                   



                                        



                                           



                                                   



                                        



                                                    









                Performing Organization Address         City/[facility] Phone

 Number

 

                [facility] CLIA: [id number], [address] [address]

15 [phone]



                LABORATORY      [facility]                  



CORONAVIRUS COVID-19 TESTING (2020  8:36 PM CDT)





             Component    Value        Ref Range    Performed At [facility]

nature

 

             SARS-CoV-2   Not Detected Not Detected [facility] 



                                                    LABORATORY   









                                        Specimen

 

                                        Swab - NASOPHARYNGEAL SWAB









                          Narrative                 Performed At

 

                          ID NOW COVID-19 Assay is an isothermal nucleic [facility] 

LABORATORY



                          acid amplification test intended for the 



                          qualitative detection of nucleic acid from 



                          SARS-CoV-2 viral RNA in nasopharyngeal (NP) 



                          specimens. It is used under Emergency Use 



                          Authorization (EUA) by FDA. The limit of 



                          detection (LOD) of the assay is 125 Genome 



                                        Equivalents/mL.



                                        



                                         



                                        



                          A positive result is indicative of the presence 



                          of SARS-CoV-2 RNA. Clinical correlation with 



                          patient history and other diagnostic information 



                          is necessary to determine patient infection 



                                        status.



                                        



                                         



                                        



                          A negative (Not Detected) result does not 



                          preclude SARS-CoV-2 infection. Clinical 



                          correlation with patient history and other 



                          diagnostic information should be used in patient 



                                        management decisions. 



                                        



                                         



                                        



                          Invalid: Please collect a new specimen for 



                          repeat patient testing if clinically indicated. 









                Performing Organization Address         City/[facility]/Zipcode Phone

 Number

 

                [facility] CLIA: [id number], [address] [address]

15 [phone]



                [facility]                  



URINALYSIS (2020  8:35 PM CDT)





             Component    Value        Ref Range    Performed At [facility]ologist [facility]

nature

 

             APPEARANCE   Clear        Clear        [facility] 



                                                    LABORATORY   

 

             COLOR        Yellow       Yellow       [facility] 



                                                    LABORATORY   

 

             PH           6.0          4.8 - 8.0    [facility] 



                                                    LABORATORY   

 

             SP GRAVITY   1.013        1.003 - 1.030 [facility] 



                                                    LABORATORY   

 

             GLU U QUAL   Normal       Normal       [facility] 



                                                    LABORATORY   

 

             BLOOD        1+ (A)       Negative     [facility] 



                                                    LABORATORY   

 

             KETONES      5 mg/dL (A)  Negative     [facility] 



                                                    LABORATORY   

 

             PROTEIN      Negative     Negative     [facility] 



                                                    LABORATORY   

 

             UROBILIN     Normal       Normal       [facility] 



                                                    LABORATORY   

 

             BILIRUBIN    Negative     Negative     [facility] 



                                                    LABORATORY   

 

             NITRITE      Negative     Negative     [facility] 



                                                    LABORATORY   

 

             LEUK TENZIN   Negative     Negative     [facility] 



                                                    LABORATORY   

 

             RBC/HPF      8 (H)        0 - 3 HPF    [facility] 



                                                    LABORATORY   

 

             WBC/HPF      2            0 - 5 HPF    [facility] 



                                                    LABORATORY   

 

             BACTERIA     Few (A)      Negative     [facility] 



                                                    LABORATORY   

 

             MUCOUS       Slight (A)   Negative LPF [facility] 



                                                    LABORATORY   

 

             SQ EPITH     <1           HPF          [facility] 



                                                    LABORATORY   









                                        Specimen

 

                                        Urine - URINE, CLEAN CATCH









                Performing Organization Address         City/[facility] Phone

 Number

 

                [facility] CLIA: [id number], [address] [address]

15 [phone]



                LABORATORY      [address]                  



CREATINE KINASE (2020  7:24 PM CDT)





             Component    Value        Ref Range    Performed At [facility]

nature

 

             CK           63           33 - 194 U/L [facility] 



                                                    LABORATORY   









                                        Specimen

 

                                        Blood - VENOUS









                Performing Organization Address         City/State/[facility] Phone

 Number

 

                [facility] CLIA: [id number], [address] [place] [address]

15 [phone]



                LABORATORY      [address]                  



N-TERMINAL PRO-BNP (2020  7:24 PM CDT)





             Component    Value        Ref Range    Performed At [facility]

nature

 

             NT-proBNP    880 (H)      <=450 pg/mL  [facility] 



                                                    LABORATORY   









                                        Specimen

 

                                        Blood - VENOUS









                          Narrative                 Performed At

 

                          Biotin has been reported to cause a negative [facility] 

LABORATORY



                          bias, interpret results relative to patient's 



                          use of biotin.            









                Performing Organization Address         City/State/[facility] Phone

 Number

 

                [facility] CLIA: [id number], [address] [address]

15 [phone]



                LABORATORY      [address]                  



THYROID STIMULATING HORMONE (2020  7:24 PM CDT)





             Component    Value        Ref Range    Performed At [facility]

nature

 

             TSH          13.80 (H)    0.45 - 4.70 [facility]IU[facility]

[facility] 



                                                    LAB[facility]ATORY   









                                        Specimen

 

                                        Blood - VENOUS









                Performing Organization Address         City/[facility] Phone

 Number

 

                [facility] CLIA: [id number], [address] [place] [address]

15 [phone]



                LABORATORY      [address]                  



PHOSPHORUS (2020  7:24 PM CDT)





             Component    Value        Ref Range    Performed At [facility]

nature

 

             PHOSPHORUS   3.6          2.5 - 5.0 mg/dL [facility]

 



                                                    LABORATORY   









                                        Specimen

 

                                        Blood - VENOUS









                Performing Organization Address         City/[facility] Phone

 Number

 

                [facility] CLIA: [id number], [address], 

15 [phone]



                LABORATORY      [address]                  



MAGNESIUM (2020  7:24 PM CDT)





             Component    Value        Ref Range    Performed At [facility]

[facility]

 

             MAGNESIUM    1.8          1.7 - 2.4 mg/dL [facility]

 



                                                    LABORATORY   









                                        Specimen

 

                                        Blood - VENOUS









                Performing Organization Address         City/State/Zipcode Phone

 Number

 

                [facility] CLIA: [id number], [address], 

15 [phone]



                LABORATORY      [address]                  



CBC WITH DIFFERENTIAL (2020  7:24 PM CDT)





             Component    Value        Ref Range    Performed At [facility]

nature

 

             WBC          7.90         4.30 - 11.10 [facility] 



                                       10*3/L     \[facility]\"" LABORATORY 

 

             RBC          3.61 (L)     3.93 - 5.25  [facility] 



                                       10*6/L     \[facility]\"" LABORATORY 

 

             HGB          10.5 (L)     11.6 - 15.0  [facility] 



                                       g/dL         \[facility]\"" LABORATORY 

 

             HCT          32.4 (L)     35.7 - 45.2 % [facility] LABORATORY 

 

             MCV          89.8         80.6 - 95.5 fL [facility] LABORATORY 

 

             MCH          29.1         25.9 - 32.8 pg [facility] LABORATORY 

 

             MCHC         32.4         31.6 - 35.1  [facility] 



                                       g/dL         \[facility]\"" LABORATORY 

 

             RDW-SD       50.0 (H)     39.0 - 49.9 fL [facility] LABORATORY 

 

             RDW-CV       15.1         12.0 - 15.5 % [facility] LABORATORY 

 

             PLT          267          166 - 358    [facility] 



                                       10*3/L     \[facility]\"" LABORATORY 

 

             MPV          9.2 (L)      9.5 - 12.9 fL [facility] LABORATORY 

 

             NRBC/100 WBC 0.0          0.0 - 10.0 /100 [facility] 



                                       WBCs         \[facility]\"" LABORATORY 

 

             NRBC x10^3   <0.01        10*3/L     [facility] LABORATORY 

 

             GRAN MAT (NEUT) % 75.7         %            [facility] LABORATORY 

 

             IMM GRAN %   0.50         %            [facility] LABORATORY 

 

             LYMPH %      17.1         %            [facility] LABORATORY 

 

             MONO %       5.7          %            [facility] LABORATORY 

 

             EOS %        0.4          %            [facility] LABORATORY 

 

             BASO %       0.6          %            [facility] LABORATORY 

 

             GRAN MAT x10^3(ANC) 5.98         1.88 - 7.09  [facility] 



                                       10*3/uL      HOSPITAL LABORATORY 

 

             IMM GRAN x10^3 0.04         0.00 - 0.06  [facility] 



                                       10*3/uL      HOSPITAL LABORATORY 

 

             LYMPH x10^3  1.35         1.32 - 3.29  [facility] 



                                       10*3/uL      HOSPITAL LABORATORY 

 

             MONO x10^3   0.45         0.33 - 0.92  [facility] 



                                       10*3/uL      HOSPITAL LABORATORY 

 

             EOS x10^3    0.03         0.03 - 0.39  [facility] 



                                       10*3/uL      HOSPITAL LABORATORY 

 

             BASO x10^3   0.05         0.01 - 0.07  [facility] 



                                       10*3/uL      HOSPITAL LABORATORY 









                                        Specimen

 

                                        Blood - VENOUS









                Performing Organization Address         City/[facility] Phone

 Number

 

                [facility] CLIA: [id number], [address] [address]

15 [phone]



                LABORATORY      [address]                  



TROPONIN I (2020  7:24 PM CDT)





             Component    Value        Ref Range    Performed At [facility]

[facility]

 

             TROPONIN I   <0.012       <=0.034 ng/mL [facility] 



                                                    LABORATORY   









                                        Specimen

 

                                        Blood - VENOUS









                          Narrative                 Performed At

 

                                        Equal or Less than 0.034 ng/ml---Normal 





                                        [facility] LABORATORY



                          Note: Cardiac troponin begins to rise 3-4 hours 



                          after the onset of ischemia. Repeat in 4-6 hours 



                          if the sample was drawn within 3-4 hours of the 



                                        onset of the symptom and found normal. 



                                        



                                         



                                        



                          Between 0.035 and 0.120 ng/mL--- Borderline. 



                                        Questionable myocardial injury or necros

is  



                                        



                          Note: Serial measurement may be necessary to 



                          confirm or exclude the diagnosis of myocardial 



                          injury or necrosis; Clinical correlation 



                          (symptoms, EKGs, imaging studies, and others) 



                          required; Repeat in 4-6 hours if clinically 



                                        indicated.    



                                        



                                         



                                        



                          Equal or Higher than 0.121 ng/mL---Abnormal. 



                          Myocardial Injury or Necrosis Likely     



                                        



                                        



                                         



                                        



                          Biotin has been reported to cause a negative 



                          bias, interpret results relative to patient's 



                          use of biotin.            



                                                   



                                        



                                           



                                                   



                                        



                                                    









                Performing Organization Address         City/[facility] Phone

 Number

 

                [facility] CLIA: [id number], [address] [address]

15 [phone]



                LABORATORY      [address]                  



COMP. METABOLIC PANEL (54130) (2020  7:24 PM CDT)





             Component    Value        Ref Range    Performed At [facility]

nature

 

             NA           140          135 - 145    [facility] 



                                       mmol/L       \[facility]\"" LABORATORY 

 

             K            3.4 (L)      3.5 - 5.0    [facility] 



                                       mmol/L       \[facility]\"" LABORATORY 

 

             CL           106          98 - 108 mmol/L [facility] LABORATORY 

 

             CO2 TOTAL    24           23 - 31 mmol/L [facility] LABORATORY 

 

             AGAP         10           2 - 16       [facility] LABORATORY 

 

             BUN          10           7 - 23 mg/dL [facility] LABORAT[facility] 

 

             GLUCOSE      106          70 - 110 mg/dL [facility] LABORATORY 

 

             CREATININE   0.65         0.50 - 1.04  [facility] 



                                       mg/dL        \[facility]\"" LABORATORY 

 

             TOTAL BILI   0.7          0.1 - 1.1 mg/dL [facility] LABORATORY 

 

             CALCIUM      8.8          8.6 - 10.6   [facility] 



                                       mg/dL        \[facility]\"" LABORATORY 

 

             T PROTEIN    7.0          6.3 - 8.2 g/dL [facility] LABORATORY 

 

             ALBUMIN      3.5          3.5 - 5.0 g/dL [facility] LABORATORY 

 

             ALK PHOS     137 (H)      34 - 122 U/L [facility] LABORATORY 

 

             ALTv         13           5 - 35 U/L   [facility] LABORATORY 

 

             AST(SGOT)    25           13 - 40 U/L  [facility] LABORATORY 

 

             eGFR Calculation 88.6         mL/min/1.73m2 [facility] 



             [facility] 



             [employer])                                           

 

             eGFR Calculation 107.4        mL/min/1.73m2 [facility] 



             (African American)                           \[facility]\"" LABORATORY 









                                        Specimen

 

                                        Blood - VENOUS









                          Narrative                 Performed At

 

                          [facility] of Glomerular Filtration Rate (GFR) [facility] 

LABORATORY



                                        and Staging of Kidney Disease*



                                        



                                         



                                        



                          +-----------------------+---------------------+- 



                                        ------------------------+



                                        



                          | GFR (mL/min/1.73 m2) | With Kidney Damage 



                                        | Without Kidney Damage



                                        



                          +-----------------------+---------------------+- 



                                        ------------------------+



                                        



                          | >90         | Stage one 



                              |  Normal        



                                        



                                        



                          +-----------------------+---------------------+- 



                                        ------------------------+



                                        



                          | 60-89        | Stage two 



                                            |  Decreased GFR   

  



                                        



                          +-----------------------+---------------------+- 



                                        ------------------------+



                                        



                          | 30-59        | Stage three 



                                           |  Stage three     

 



                                        



                          +-----------------------+---------------------+- 



                                        ------------------------+



                                        



                          | 15-29        | Stage four 



                                            |  Stage four     

 



                                        



                          +-----------------------+---------------------+- 



                                        ------------------------+



                                        



                          | <15 (or dialysis)  | Stage five   



                                          |  Stage five      



                                        



                          +-----------------------+---------------------+- 



                                        ------------------------+



                                        



                                         



                                        



                          *Each stage assumes the associated GFR level has 



                          been in effect for at least three months. 



                          Stages 1 to 5, with or without kidney disease, 



                                        indicate chronic kidney disease.



                                        



                                         



                                        



                          Notes: Determination of stages one and two (with 



                          eGFR >59mL/min/1.73 m2) requires estimation of 



                          kidney damage for at least three months as 



                          defined by structural or functional 



                          abnormalities of the kidney, manifested by 



                                        either:



                                        



                          Pathological abnormalities or Markers of kidney 



                          damage (including abnormalities in the 



                          composition of the blood or urine or 



                                        abnormalities in imaging tests). 



                                        



                                                    









                Performing Organization Address         City/State/Zipcode Phone

 Number

 

                [facility] CLIA: [id number], [address], 

15 [phone]



                LABORATORY      [address]                  



XR CHEST 1 VW (2020  7:22 PM CDT)





                                        Specimen

 

                                        









                          Impressions               Performed At

 

                                         



                                        PACS/VR/DOSE



                          1. Left lower lobe opacities again seen likely repre

sent combination 



                                        of



                                        



                                        atelectasis or scarring.



                                        



                          2. A left-sided moderate-sized hiatal hernia is agai

n seen. 









                          Narrative                 Performed At

 

                                        EXAM: XR CHEST 1 VW



                                        PACS/VR/DOSE



                                         



                                        



                                        HISTORY: fall, LOC 



                                        



                                         



                                        



                                        COMPARISON: 3/22/2020



                                        



                                         



                                        



                                        FINDINGS:



                                        



                                         



                                        



                                        Lines/Tubes: None.



                                        



                                         



                                        



                          Lungs: Again seen the left lower lobe hazy opacity wit

h an air-fluid 



                                        level



                                        



                                        consistent with hiatal hernia and adjace

nt atelectasis or scarring. No



                                        



                                        pleural effusion or pneumothorax is iden

tified.



                                        



                                         



                                        



                                        Heart/Mediastinum: The cardiomediastinal

 silhouette is normal for



                                        



                                        technique.



                                        



                                         



                                        



                                        Bones: No acute osseous abnormality is s

een. Right shoulder reverse



                                        



                                        arthroplasty was partial visualized.



                                        



                                                    









                                        Procedure Note

 

                                        [facility], Radiant Results Inft User - 2020  7:36 PM CDT



EXAM: XR CHEST 1 VW



                                        



                                        HISTORY: fall, LOC



                                        



                                        COMPARISON: 3/22/2020



                                        



                                        FINDINGS:



                                        



                                        Lines/Tubes: None.



                                        



                                        Lungs: Again seen the left lower lobe ha

zy opacity with an air-fluid level



                                        consistent with hiatal hernia and adjace

nt atelectasis or scarring. No



                                        pleural effusion or pneumothorax is iden

tified.



                                        



                                        Heart/Mediastinum: The cardiomediastinal

 silhouette is normal for



                                        technique.



                                        



                                        Bones: No acute osseous abnormality is s

een. Right shoulder reverse



                                        arthroplasty was partial visualized.



                                        



                                        IMPRESSION



                                        



                                        1.  Left lower lobe opacities again seen

 likely represent combination of



                                        atelectasis or scarring.



                                        2.  A left-sided moderate-sized hiatal h

ernia is again seen.









                Performing Organization Address         City/State/Zipcode Phone

 Number

 

                PACS/VR/DOSE                                    



Laceration Repair (2020  3:59 PM CDT)





                          Narrative                 Performed At

 

                                        Nancy Mercedes PAC   2020 3:5

9 PM



                                        



                                        Laceration Repair



                                        



                                        Performed by: Nancy Mercedes PAC



                                        



                                        Authorized by: Mercedes, Nancy S, PAC 



                                        



                                         



                                        



                                        Consent: 



                                        



                                         Consent obtained: unable to obtain ve

rbal consent for procedure from 



                                        



                                        patient due to dementia but consent give

n by family to treat patient.



                                        



                           Risks discussed: Infection, poor wound healing an

d need for 



                                        additional 



                                        



                                        repair (with patient's daughter)



                                        



                                        Universal protocol: 



                                        



                                         Patient identity confirmed: Arm ban

d



                                        



                                        Anesthesia (see MAR for exact dosages): 



                                        



                                         Anesthesia method: Local infiltrati

on



                                        



                                         Local anesthetic: Lidocaine 1% w/o 

epi



                                        



                                        Laceration details: 



                                        



                                         Location: Scalp



                                        



                                         Scalp location: Occipital



                                        



                                        Repair type: 



                                        



                                         Repair type: Intermediate



                                        



                                        Exploration: 



                                        



                                         Hemostasis achieved with: Direct pr

essure



                                        



                                         Wound exploration: entire depth of wo

und probed and visualized 



                                        



                                         Wound extent: no foreign bodies/mater

ial noted 



                                        



                                        Treatment: 



                                        



                                         Area cleansed with: Betadine



                                        



                                         Amount of cleaning: Standard



                                        



                                        Skin repair: 



                                        



                                         Repair method: Staples



                                        



                                         Number of staples: 2



                                        



                                        Approximation: 



                                        



                                         Approximation: Close



                                        



                                        Post-procedure details: 



                                        



                                         Dressing: Bulky dressing (pressure 

dressing)



                                        



                                         Patient tolerance of procedure: Elsie

erated well, no immediate 



                                        



                                        complications



                                        



                                        Comments: 



                                        



                            Small laceration with large hematoma underlying; c

reated limited 



                                        defect 



                                        



                                        by expanding laceration to drain bleedin

g and staples placed in wound 



                                        



                          followed by surgicel and pressure dressing. 



CT CERVICAL SPINE WO CONTRAST (2020  2:42 PM CDT)





                                        Specimen

 

                                        









                          Narrative                 Performed At

 

                                        HISTORY: Trauma. S/P fall.



                                        PACS/VR/DOSE



                                         



                                        



                          TECHNIQUE: 64-multidetector Spiral CT of the cervical 

spine is obtained 



                                        and



                                        



                                        subsequently sagittal, coronal reformati

ons are obtained.



                                        



                                         



                                        



                                        FINDINGS: Comparison is made with 3/22/2

020 study.



                                        



                                         



                                        



                                        No acute compression fracture in cervica

l spine detected. However,



                                        



                          compression fracture is seen involving upper plate of 

T3 vertebral body, 



                                        of



                                        



                                        uncertain age. Unfortunately the T3 was 

not included in the previous CT



                                        



                                        scan of 3/22/2020. 



                                        



                                         



                                        



                                        A linear radiolucent line also noted in 

the left lamina of T3 with well



                                        



                                        corticated edges. This is not confirmed 

to be a definite fracture on the



                                        



                                        reformatted sagittal and coronal images 

and could be caused by focal



                                        



                          degenerative change in the superior articular facet of

 T4 vertebral 



                                        body.



                                        



                                         



                                        



                                        Lower cervical and upper thoracic levosc

oliosis noted. Degenerative disc



                                        



                          disease noted at C5-C6, C6-C7. Facet arthritis noted, 

bilateral at 



                                        C3-C4,



                                        



                                        C4-C5, C7-T1 and T1-T2 levels. Left face

t joint at C2-C3 is partially



                                        



                                        fused.



                                        



                                         



                                        



                          Soft tissues in prevertebral region appear normal. Spi

nal cord or 



                                        ligament



                                        



                                        or vascular injury can not be evaluated 

by this study.



                                        



                                         



                                        



                                        CONCLUSIONS: Compression fracture in upp

er T3 with loss of approximately



                                        



                          20% of the height. Exact age of the fracture is uncert

ain. Unfortunately 



                                        T3



                                        



                          vertebral body was not included in previous CT scan of

 3/22/2020. 



                                        However,



                                        



                                        fracture appears stable with no encroach

ment into the spinal canal.



                                        



                                                    









                                        Procedure Note

 

                                        [facility], Radiant Results Inft User - 2020  2:54 PM CDT



HISTORY:  Trauma. S/P fall.



                                        



                                        TECHNIQUE: 64-multidetector Spiral CT of

 the cervical spine is obtained and



                                        subsequently sagittal, coronal reformati

ons are obtained.



                                        



                                        FINDINGS: Comparison is made with 3/22/2

020 study.



                                        



                                        No acute compression fracture in cervica

l spine detected. However,



                                        compression fracture is seen involving u

pper plate of T3 vertebral body, of



                                        uncertain age. Unfortunately the T3 was 

not included in the previous CT



                                        scan of 3/22/2020.



                                        



                                        A linear radiolucent line also noted in 

the left lamina of T3 with well



                                        corticated edges. This is not confirmed 

to be a definite fracture on the



                                        reformatted sagittal and coronal images 

and could be caused by focal



                                        degenerative change in the superior doc

cular facet of T4 vertebral body.



                                        



                                        Lower cervical and upper thoracic levosc

oliosis noted. Degenerative disc



                                        disease noted at C5-C6, C6-C7. Facet art

hritis noted, bilateral at C3-C4,



                                        C4-C5, C7-T1 and T1-T2 levels. Left face

t joint at C2-C3 is partially



                                        fused.



                                        



                                        Soft tissues in prevertebral region appe

ar normal. Spinal cord or ligament



                                        or vascular injury can not be evaluated 

by this study.



                                        



                                        CONCLUSIONS: Compression fracture in upp

er T3 with loss of approximately



                                        20% of the height. Exact age of the frac

ture is uncertain. Unfortunately T3



                                        vertebral body was not included in previ

ous CT scan of 3/22/2020. However,



                                        fracture appears stable with no encroach

ment into the spinal canal.



                                        









                Performing Organization Address         City/State/Zipcode Phone

 Number

 

                PACS/VR/DOSE                                    



CT HEAD WO CONTRAST (2020  2:42 PM CDT)





                                        Specimen

 

                                        









                          Impressions               Performed At

 

                                         



                                        PACS/VR/DOSE



                                        A large, mixed density posterior scalp h

ematoma with evidence of ongoing



                                        



                                        bleeding is noted.



                                        



                                         



                                        



                                        There is no underlying calvarial fractur

e or acute intracranial



                                        



                          abnormality.              









                          Narrative                 Performed At

 

                                        CT HEAD WO CONTRAST



                                        PACS/VR/DOSE



                                         



                                        



                                        HISTORY: Female 74 years fall-head injur

y 



                                        



                                         



                                        



                                        COMPARISON: CT head dated 3/22/2020



                                        



                                         



                                        



                                        TECHNIQUE: Routine CT head without contr

ast



                                        



                                         



                                        



                                        FINDINGS:



                                        



                                         



                                        



                                        A large, mixed density scalp hematoma is

 noted posteriorly.



                                        



                                         



                                        



                          The ventricles are unchanged in caliber and configurat

ion. The 



                                        ventricles



                                        



                                        are unchanged in caliber and configurati

on with mild dilatation of the



                                        



                          lateral and third ventricles again noted. No midline s

hift or 



                                        pathological



                                        



                                        extra-axial fluid collection is present.

 The basal cisterns are



                                        



                                        unremarkable.



                                        



                                         



                                        



                          No acute intracranial hemorrhage or mass effect is pre

sent. The 



                                        gray-white



                                        



                          matter differentiation is preserved. Mild background i

schemic small 



                                        vessel



                                        



                                        disease is again noted.



                                        



                                         



                                        



                                        The calvarium and skull base are unremar

kable. The mastoid air cells and



                                        



                                        visualized paranasal air sinuses are terry

ar.



                                        



                                                    









                                        Procedure Note

 

                                        [facility], Radiant Results [facility] User - 2020  2:51 PM CDT



CT HEAD WO CONTRAST



                                        



                                        HISTORY: Female 74 years fall-head injur

y



                                        



                                        COMPARISON: CT head dated 3/22/2020



                                        



                                        TECHNIQUE: Routine CT head without contr

ast



                                        



                                        FINDINGS:



                                        



                                        A large, mixed density scalp hematoma is

 noted posteriorly.



                                        



                                        The ventricles are unchanged in caliber 

and configuration. The ventricles



                                        are unchanged in caliber and configurati

on with mild dilatation of the



                                        lateral and third ventricles again noted

. No midline shift or pathological



                                        extra-axial fluid collection is present.

 The basal cisterns are



                                        unremarkable.



                                        



                                        No acute intracranial hemorrhage or mass

 effect is present. The gray-white



                                        matter differentiation is preserved. Mil

d background ischemic small vessel



                                        disease is again noted.



                                        



                                        The calvarium and skull base are unremar

kable. The mastoid air cells and



                                        visualized paranasal air sinuses are terry

ar.



                                        



                                        IMPRESSION



                                        



                                        A large, mixed density posterior scalp h

ematoma with evidence of ongoing



                                        bleeding is noted.



                                        



                                        There is no underlying calvarial fractur

e or acute intracranial



                                        abnormality.









                Performing Organization Address         City/State/Zipcode Phone

 Number

 

                PACS/VR/DOSE                                    



documented in this encounter



Visit Diagnoses







                                        Diagnosis

 

                                        Injury of head, initial encounter - Prim

shukri

 

                                        Fall, initial encounter

 

                                        Contusion of scalp, initial encounter

 

                                        LOC (loss of consciousness)







                                        Other alteration of consciousness

 

                                        Syncope and collapse

 

                                        Opacity of lung on imaging study

 

                                        Syncope, unspecified syncope type

 

                                        Right arm pain







                                        Pain in limb

 

                                        Dementia with behavioral disturbance, un

specified dementia type

 

                                        Hypothyroidism, unspecified type

 

                                        Dizziness







                                        Dizziness and giddiness

 

                                        Essential hypertension







                                        Unspecified essential hypertension

 

                                        Hyperlipemia







                                        Other and unspecified hyperlipidemia

 

                                        Orthostatic hypotension

 

                                        PAF (paroxysmal atrial fibrillation)







                                        Atrial fibrillation



documented in this encounter



Administered Medications







           Medication Order MAR Action Action Date Dose       Rate       Site

 

           ciprofloxacin HCl (CIPRO) tablet Given      04/10/2020  8:01 AM CDT 5

00 mg                



                                        500 mg



                                                                 



           500 mg, Oral, BID, First dose on                                     

        



           Fri 4/10/20 at 0115, Until                                           

  



           Discontinued, ASAP, Reason for                                       

      



           Anti-Infective: Documented                                           

  



           Infection, Documented Infection                                      

       



           Site: Skin / Soft Tissue,                                            

 



           Duration of Therapy: 7 days                                          

   









             Given        04/10/2020  1:21 AM  mg                    









                                                    









                doxycycline hyclate (Vibramycin) capsule 100 Given           04/

10/2020  6:24 AM  

mg                                                  



                                        mg



                                                                 



           100 mg, Oral, Q12HA2, First dose on Fri                              

               



           4/10/20 at 0115, Until Discontinued, Reason                          

                   



           for Anti-Infective: Documented Infection,                            

                 



           Documented Infection Site: Skin / Soft                               

              



           Tissue, Duration of Therapy: 7 days                                  

           









             Given        04/10/2020  1:22 AM  mg                    









                                                    









                                        KCL (KLOR-CON M20) tablet 20 mEq



             Given        04/10/2020  8:01 AM CDT 20 mEq                    



           20 mEq, Oral, DAILY, First dose on Fri                               

              



           4/10/20 at 0115, Until Discontinued, Routine                         

                    









             Given        04/10/2020  1:22 AM CDT 20 mEq                    









                                                    









             lactobacillus acidophilus (ACIDOPHILLUS) Given        04/10/2020 11

:50 AM CDT 1 tablet     

                                        



                                        25 million cell -100 mg captab 1 tablet



                                                                 



           1 tablet, Oral, QID, First dose on Fri                               

              



           4/10/20 at 0115, Until Discontinued,                                 

            



           Routine                                                









             Given        04/10/2020  8:01 AM CDT 1 tablet                  

 

             Given        04/10/2020  1:22 AM CDT 1 tablet                  









                                                    

 

                                        levothyroxine (SYNTHROID) tablet 100 mcg



                                        



                          100 mcg, Oral, QAM-0600, First dose on Sat 4/11/20 at 

0600, Until Discontinued, 



                          Routine                   

 

                                                    









                                        magnesium oxide (MAG-) tablet 400 

mg



             Given        04/10/2020  8:01 AM  mg                    



           400 mg, Oral, BID, First dose on Fri 4/10/20                         

                    



           at 0115, Until Discontinued, Routine                                 

            









             Given        04/10/2020  1:22 AM  mg                    









                                                    









                metoprolol succinate XL (TOPROL XL) tablet 50 Given           04

/10/2020  8:06 AM CDT 50 

mg                                                  



                                        mg



                                                                 



           50 mg, Oral, DAILY, First dose on Fri 4/10/20                        

                     



           at 0900, Until Discontinued, Routine                                 

            









                                                    









                                        rivastigmine tartrate (EXELON) capsule 3

 mg



             Given        04/10/2020  8:31 AM CDT 3 mg                      



           3 mg, Oral, QAM+PM, First dose on Fri 4/10/20                        

                     



           at 0115, Until Discontinued,                           

                   



           approving Restricted medication: MEGADC                              

               









                                                    









           vitamin B-12 (CYANOCOBALAMIN) tablet Given      04/10/2020  8:31 AM C

DT 1,000 mcg             



                                        1,000 mcg



                                                                 



           1,000 mcg, Oral, DAILY, First dose on Fri                            

                 



           4/10/20 at 0900, Until Discontinued,                                 

            



           Routine                                                









                                                    









                                        









           Medication Order MAR Action Action Date Dose       Rate       Site

 

           haloperidol lactate Given      04/10/2020  4:03 PM 5 mg              

    Right Deltoid-IM



                                        (HALDOL) injection 5 mg



                          CDT                                    



           5 mg, Intramuscular, ONCE,                                           

  



           1 dose, Fri 4/10/20 at                                             



           1700, Routine                                             









                                                    









                                        levothyroxine (SYNTHROID) tablet 88 mcg



             Given        04/10/2020  6:24 AM CDT 88 mcg                    



           88 mcg, Oral, QAM-0600, First dose on Fri                            

                 



           4/10/20 at 0600, Until Discontinued, Routine                         

                    









                                                    









                                        LORazepam (ATIVAN) injection 0.5 mg



             Given        04/10/2020  3:37 PM CDT 0.5 mg                    



           0.5 mg, Slow IV Push, ONCE, 1 dose, Fri                              

               



           4/10/20 at 1645, Routine                                             









                                                    



documented in this encounter



Insurance







          Payer     Benefit Plan / Subscriber ID Effective Dates Phone     Addre

ss   Type



                    Group                                             

 

          MEDICARE  MEDICARE PART xxxxxxxxxxx [id number] [id number] [facility] 

[facility] 

Medicare



                      A & B                 t          2          [id number]



                                        



                                                            [place] 



                                                            [id number] 









           Guarantor Name Account Type Relation to Date of    Phone      Billing



                                 Patient    Birth                 Address

 

           Sundeep Garza Personal/Family Self       1945 [phone]                                         (Home)     [place], [place]



                                                                  [id number]



documented as of this encounter

## 2020-05-14 NOTE — RAD REPORT
EXAM DESCRIPTION:  CT - [id number] - 5/14/2020 9:45 am

 

CLINICAL HISTORY:  Trauma, head and neck injury.

PAIN

 

COMPARISON:  No comparisons

 

TECHNIQUE:  Axial 5 mm thick images of the head were obtained.

 

Axial 2 mm thick images of the cervical spine were obtained with sagittal and coronal reconstruction 
images generated and reviewed.

 

All CT scans are performed using dose optimization technique as appropriate and may include automated
 exposure control or mA/KV adjustment according to patient size.

 

FINDINGS:  CT HEAD WITHOUT CONTRAST:

 

No acute hemorrhage, hydrocephalus or extra-axial collection is identified.Moderate generalized brain
 atrophy is present with moderate periventricular and deep white matter chronic microvascular ischemi
c changes.No areas of brain edema or midline shift.

 

The paranasal sinuses and mastoids are clear.The calvarium is intact. Moderate frontal scalp hematoma
.

 

CT CERVICAL SPINE WITHOUT CONTRAST:

 

No fracture or subluxation.Mild lower cervical degenerative changes.No prevertebral soft tissues swel
ling is identified.

 

IMPRESSION:  No acute intracranial or cervical spine findings.

## 2020-05-14 NOTE — XMS REPORT
Patient Summary Document

                             Created on:May 14, 2020



Patient:SUNDEEP REYES

Sex:Female

:1945

External Reference #:[id number]





Demographics







                          Address                   [address]



                                                    [place] [address]

 

                          Home Phone                [phone] CELL

 

                          Work Phone                [phone]

 

                          Mobile Phone              [phone]

 

                          Email Address             [email or web address]

 

                          Preferred Language        English

 

                          Marital Status            Unknown

 

                          Denominational Affiliation     Unknown

 

                          Race                      Unknown

 

                          Additional Race(s)        Unavailable



                                                    White

 

                          Ethnic Group              Not  or 









Author







                          Organization              [facility]

t

 

                          Address                   [address]



                                                    [place] [address]

 

                          Phone                     [phone]









Support







                Name            Relationship    Address         Phone

 

                Gera           Child           [address]   [phone]



                                                [place] [address] 

 

                Greg         Child           [address] [phone]



                                                [place] [address] 









Care Team Providers







                    Name                Role                Phone

 

                    Laci CHAPA         Attending Clinician [phone]

 

                    LORENE Justin       Attending Clinician [phone]

 

                    Ramses BERRIOS  F    Attending Clinician [phone]

 

                    Chichi CHAPA          Attending Clinician [phone]

 

                    Davis CHAPA           Attending Clinician [phone]

 

                    Umer BERRIOS,  R       Attending Clinician [phone]

 

                    Jennifer CHAPA,  Gene   Attending Clinician [phone]

 

                    Doctor Unassigned,  Name Attending Clinician Unavailable

 

                    Sharan HIGGINS  L      Attending Clinician Unavailable

 

                    Margaret Dillard  Attending Clinician [phone]

 

                    David CHAPA         Attending Clinician [phone]

 

                    Chichi CHAPA          Admitting Clinician [phone]

 

                    David CHAPA         Admitting Clinician [phone]









Problems

This patient has no known problems.



Allergies, Adverse Reactions, Alerts

This patient has no known allergies or adverse reactions.



Medications

This patient has no known medications.



Procedures

This patient has no known procedures.



Encounters







        Start   End     Encounter Admission Attending Care    Care    Encounter 

Source



        Date/Time Date/Time Type    Type    Clinicians Facility Department ID   

   

 

        2020 Telephone         LISA Aguero    1.3.949.114 [id number]

[id number] 



        00:00:00 00:00:00                 [place]  [place] 350.1.13.10         



                                                [place] 4.2.7.2.686         



                                                [place] [phone]         



                                                [facility]     188             



                                                Building                 

 

        2020 2020-04-10 Emergency         Nancy [facility]    1.2.840.1

14 [id number] 



        13:22:30 19:51:00                 Tahira Pearce 350.1.13.

10         



                                        [place] 4.2.7.2.686      

   



                                                [place]  [phone]         



                                                        081             

 

        2020-04-10 2020-04-10 Telephone         [facility]    1.2.520.114 [id number]

[id number] 



        00:00:00 00:00:00                 [facility]  350.1.13.10         



                                                [place] 4.2.7.2.686         



                                                [place] [phone]         



                                                [place] [address]             



                                                Office                  



                                                Building                 



                                                One                     

 

        2020 Refill          [facility]    1.2.840.114 [id number]

[id number] 



        00:00:00 00:00:00                 [facility]  350.1.13.10         



                                                [place] 4.2.7.2.686         



                                                [place] [phone]         



                                                [place] [address]             



                                                Office                  



                                                Building                 



                                                One                     

 

        2020 Emergency         [facility]    1.2.124.221 [id number]

[id number] 



        10:09:32 13:42:00                 Lit [place] 350.1.13.10         



                                                [place] 4.2.7.2.686         



                                                [place]  [phone]         



                                                        084             

 

        2020 Telephone         [facility]    1.2.840.114 [id number]

[id number] 



        00:00:00 00:00:00                 Darnell [place] 350.1.13.10      

   



                                                [place] 4.2.7.2.686         



                                                [place] [phone]         



                                                [facility]             



                                                [facility]                 

 

        2020 Orders          Doctor  LICHA    1.2.840.114 [id number]

[id number] 



        00:00:00 00:00:00 Only            Unassigned, REVA   350.1.13.10       

  



                                        [facility] 4.2.7.2.686         



                                                        [phone]         



                                                        009             

 

        2020 Transition         Gordon Tsang  1.2.840.114 [id number]

[id number] 



        00:00:00 00:00:00 of Care         Marcia Palmer   350.1.13.10         



                                                [place]   4.2.7.2.686         



                                                        [phone]         



                                                        403             

 

        2020 Telephone         [facility]    1.2.151.865 [id number]

[id number] 



        00:00:00 00:00:00                 [facility]  350.1.13.10         



                                                [place] 4.2.7.2.686         



                                                [place] [phone]         



                                                nal     044             



                                                Office                  



                                                Building                 



                                                One                     

 

        2020 Emergency         Alfredito, TANIYA [facility]    1.2.840.

114 [id number] 



        10:14:00 13:15:00                 Vito Hernandez 350.1.13.10  

       



                                                [place] 4.2.7.2.686         



                                                [place]  [phone]         



                                                        081             

 

        2020 Office          Jennifer [facility]    1.2.840.114 [id number]

[id number] 



        14:22:34 15:53:57 Visit           Darnell [place] 350.1.13.10      

   



                                                [place] 4.2.7.2.686         



                                                [place] [phone]         



                                                nal     092             



                                                Building                 

 

        2020 Orders          Doctor  LICHA    1.2.840.114 [id number]

18 



        00:00:00 00:00:00 Only            Unassigned, REVA   350.1.13.10       

  



                                        [facility] 4.2.7.2.686         



                                                        [phone]         



                                                        009             







Results

This patient has no known results.

## 2020-05-14 NOTE — EDPHYS
Physician Documentation                                                                           

 [facility]                                                                 

Name: Radha Garza                                                                            

Age: 74 yrs                                                                                       

Sex: Female                                                                                       

: 1945                                                                                   

MRN: [id number]                                                                                   

Arrival Date: 2020                                                                          

Time: 08:48                                                                                       

Account#: [id number]                                                                            

Bed 18                                                                                            

Private MD:                                                                                       

ED Physician Tomer Polk                                                                      

HPI:                                                                                              

                                                                                             

09:12 This 74 yrs old  Female presents to ER via EMS with complaints of Fall Injury. chapin 

09:12 Details of fall: The patient fell from an upright position, while standing, while       chapin 

      walking. Onset: The symptoms/episode began/occurred just prior to arrival. Associated       

      injuries: The patient sustained injury to the head. Severity of symptoms: At their          

      worst the symptoms were mild.                                                               

                                                                                                  

Historical:                                                                                       

- Allergies:                                                                                      

09:01 Morphine;                                                                               em  

09:01 PENICILLINS;                                                                            em  

09:01 Macrobid;                                                                               em  

09:01 Chocolate;                                                                              em  

- Home Meds:                                                                                      

09:01 metoprolol tartrate 50 mg Oral tab [Active]; magnesium oxide 400 mg Oral tab [Active];  em  

      Klor-Con M20 20 mEq Oral TbTQ 1 tab once daily [Active]; Synthroid 100 mcg Oral tab 1       

      tab once daily [Active]; tramadol 50 mg Oral tab 1 tab every 6 hours [Active];              

- PMHx:                                                                                           

09:01 Anemia; Hypothyroidism; Hyperlipidemia; Hypertension; Atrial Fib; convulsions;          em  

      traumatic subdural hemorrhage; hypokalemia;                                                 

                                                                                                  

- Immunization history:: Adult Immunizations up to date.                                          

- Social history:: Smoking status: unknown.                                                       

                                                                                                  

                                                                                                  

ROS:                                                                                              

09:13 Constitutional: Negative for fever, chills, and weight loss, Eyes: Negative for injury, chapin 

      pain, redness, and discharge, ENT: Negative for injury, pain, and discharge, Neck:          

      Negative for injury, pain, and swelling, Cardiovascular: Negative for chest pain,           

      palpitations, and edema, Respiratory: Negative for shortness of breath, cough,              

      wheezing, and pleuritic chest pain, Abdomen/GI: Negative for abdominal pain, nausea,        

      vomiting, diarrhea, and constipation, Back: Negative for injury and pain, : Negative      

      for injury, bleeding, discharge, and swelling, MS/Extremity: Negative for injury and        

      deformity, Neuro: Negative for headache, weakness, numbness, tingling, and seizure,         

      Psych: Negative for depression, anxiety, suicide ideation, homicidal ideation, and          

      hallucinations, Allergy/Immunology: Negative for hives, rash, and allergies, Endocrine:     

      Negative for neck swelling, polydipsia, polyuria, polyphagia, and marked weight             

      changes, Hematologic/Lymphatic: Negative for swollen nodes, abnormal bleeding, and          

      unusual bruising.                                                                           

09:13 Skin: Positive for hematoma, laceration(s), of the top of head.                             

09:13 Neuro: Positive for seizure activity, pta.                                                  

                                                                                                  

Exam:                                                                                             

09:13 Constitutional:  This is a well developed, well nourished patient who is awake, alert,  chapin 

      and in no acute distress. Eyes:  Pupils equal round and reactive to light, extra-ocular     

      motions intact.  Lids and lashes normal.  Conjunctiva and sclera are non-icteric and        

      not injected.  Cornea within normal limits.  Periorbital areas with no swelling,            

      redness, or edema. ENT:  Nares patent. No nasal discharge, no septal abnormalities          

      noted.  Tympanic membranes are normal and external auditory canals are clear.               

      Oropharynx with no redness, swelling, or masses, exudates, or evidence of obstruction,      

      uvula midline.  Mucous membranes moist. Neck:  Trachea midline, no thyromegaly or           

      masses palpated, and no cervical lymphadenopathy.  Supple, full range of motion without     

      nuchal rigidity, or vertebral point tenderness.  No Meningismus. Chest/axilla:  Normal      

      chest wall appearance and motion.  Nontender with no deformity.  No lesions are             

      appreciated. Cardiovascular:  Regular rate and rhythm with a normal S1 and S2.  No          

      gallops, murmurs, or rubs.  Normal PMI, no JVD.  No pulse deficits. Respiratory:  Lungs     

      have equal breath sounds bilaterally, clear to auscultation and percussion.  No rales,      

      rhonchi or wheezes noted.  No increased work of breathing, no retractions or nasal          

      flaring. Abdomen/GI:  Soft, non-tender, with normal bowel sounds.  No distension or         

      tympany.  No guarding or rebound.  No evidence of tenderness throughout. Back:  No          

      spinal tenderness.  No costovertebral tenderness.  Full range of motion. Skin:  Warm,       

      dry with normal turgor.  Normal color with no rashes, no lesions, and no evidence of        

      cellulitis. MS/ Extremity:  Pulses equal, no cyanosis.  Neurovascular intact.  Full,        

      normal range of motion. Neuro:  Awake and alert, GCS 15, oriented to person, place,         

      time, and situation.  Cranial nerves II-XII grossly intact.  Motor strength 5/5 in all      

      extremities.  Sensory grossly intact.  Cerebellar exam normal.  Normal gait. Psych:         

      Awake, alert, with orientation to person, place and time.  Behavior, mood, and affect       

      are within normal limits.                                                                   

09:13 Head/face: Noted is hematoma, a laceration(s), that is deep, 1.5 cm(s).                     

09:41 ECG was reviewed by the Attending Physician.                                            [facility] 

                                                                                                  

Vital Signs:                                                                                      

08:51  / 61; Pulse 77; Resp 18; Temp 99.0(O); Pulse Ox 96% on R/A; Weight 58.97 kg;     em  

      Height 5 ft. 3 in. (160.02 cm);                                                             

08:51 Body Mass Index 23.03 (58.97 kg, 160.02 cm)                                             em  

                                                                                                  

Laceration:                                                                                       

11:57 Wound Repair of 2.5cm ( 1.0in ) subcutaneous laceration to top of head. Irregularly     chapin 

      shaped.. Skin/tissue flap noted.. Distal neuro/vascular/tendon intact. Anesthesia:          

      Local anesthetic administered with 5 mls of 1% lidocaine w/ Epi. Wound prep: Simple         

      cleansing by me. Skin closed with 3 STAPLES Prolene using staple gun. Dressed with          

      Neosporin. Patient tolerated well.                                                          

                                                                                                  

MDM:                                                                                              

08:52 Patient medically screened.                                                             [facility] 

09:15 Data reviewed: vital signs, nurses notes, lab test result(s), EKG, radiologic studies,  chapin 

      CT scan.                                                                                    

09:15 Differential diagnosis: Contusion of Hematoma on Laceration of scalp, forehead,         chapin 

      Intracranial bleed- Concussion cerebral contusion. Differential diagnosis: closed head      

      injury, laceration, cardiac arrhythmia, seizure, TIA. Data interpreted: Cardiac             

      monitor: rate is 77 beats/min, Pulse oximetry: on room air is 96 %. Test                    

      interpretation: by ED physician or midlevel provider: ECG. Counseling: I had a detailed     

      discussion with the patient and/or guardian regarding: the historical points, exam          

      findings, and any diagnostic results supporting the discharge/admit diagnosis, lab          

      results, radiology results, the need for outpatient follow up, for definitive care, a       

      neurologist.                                                                                

12:00 ED course: PT AT BASELINE, will dc to [place], follow up, return as needed, fall and seizure chapin 

      precautions given.                                                                          

                                                                                                  

                                                                                             

09:11 Order name: CBC with Diff; Complete Time: 10:26                                         [facility] 

                                                                                             

09:11 Order name: Comprehensive Metabolic Panel; Complete Time: 10:26                         [facility] 

                                                                                             

09:11 Order name: CT Head C Spine; Complete Time: 11:55                                       [facility] 

                                                                                             

09:11 Order name: Troponin (emerg Dept Use Only); Complete Time: 10:26                        [facility] 

                                                                                             

11:02 Order name: Urine Culture                                                                 

                                                                                             

11:17 Order name: Urine Dipstick--Ancillary (enter results); Complete Time: 11:55               

                                                                                             

09:11 Order name: EKG; Complete Time: 09:12                                                   [facility] 

                                                                                             

09:11 Order name: EKG - Nurse/Tech; Complete Time: 09:42                                      [facility] 

                                                                                             

09:11 Order name: Urine Dipstick-Ancillary (obtain specimen); Complete Time: 11:02            [facility] 

                                                                                             

09:11 Order name: Suture Tray at Bedside; Complete Time: 13:33                                [facility] 

                                                                                             

09:11 Order name: Wound dressing; Complete Time: 13:32                                        [facility] 

                                                                                             

09:11 Order name: Seizure Precautions; Complete Time: 09:22                                   [facility] 

                                                                                                  

EC:41 Rate is 73 beats/min. Rhythm is regular. QRS Axis is Normal. MS interval is normal. QRS chapin 

      interval is normal. QT interval is normal. No Q waves. T waves are Normal. No ST            

      changes noted. Clinical impression: NSR w/ Non-specific ST/T Changes and No evidence of     

      ischemia. Interpreted by me. Reviewed by me.                                                

                                                                                                  

Administered Medications:                                                                         

09:35 Drug: NS 0.9% 500 ml Route: IV; Rate: bolus; Site: right forearm;                         

09:37 Drug: Tetanus-Diphtheria Toxoid Adult 0.5 ml {: [employer]. Exp:         em  

      2022. Lot #: [id number]. } Route: IM; Site: right deltoid;                                 

10:25 Drug: Keppra 500 mg Route: IV; Rate: per protocol; Site: right antecubital;               

11:30 Drug: Lidocaine-Epinephrine -2 % (1:100,000) 10 ml Route: Infiltration;                   

                                                                                                  

                                                                                                  

Disposition:                                                                                      

20 11:56 Discharged to Home. Impression: Fall due to bumping against object, Epileptic      

  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                        

  foreign body of other part of head, Superficial injury of head.                                 

- Condition is Stable.                                                                            

- Discharge Instructions: Head Injury, Adult, Facial Laceration, Nonepileptic Seizures,           

  Seizure, Adult, Seizure, Adult, Easy-to-Read, Facial Laceration, Easy-to-Read, Fall             

  Prevention in the Home, Easy-to-Read, Head Injury, Adult, Easy-to-Read.                         

- Prescriptions for Keppra 500 mg Oral Tablet - take 1 tablet by ORAL route every 12              

  hours; 20 tablet. Keflex 500 mg Oral Capsule - take 1 capsule by ORAL route every 8             

  hours for 10 days; 21 capsule.                                                                  

- Medication Reconciliation Form, Thank You Letter, Antibiotic Education, Prescription            

  Opioid Use form.                                                                                

- Follow up: Private Physician; When: 2 - 3 days; Reason: Recheck today's complaints,             

  Continuance of care, Re-evaluation by your physician. Follow up: Rodrigo Hope;               

  When: 2 - 3 days; Reason: Recheck today's complaints, Re-evaluation by your physician.          

- Problem is new.                                                                                 

- Symptoms have improved.                                                                         

                                                                                                  

                                                                                                  

                                                                                                  

Signatures:                                                                                       

Dispatcher [employer]                           Tomer Lunsford MD MD cha Munoz, Edgar, Emmie Yen RN, RN RN                                                      

Ling Roberts RN RN                                                      

Judy Jean RN RN                                                      

                                                                                                  

Corrections: (The following items were deleted from the chart)                                    

14:59 11:56 2020 11:56 Discharged to Home. Impression: Fall due to bumping against      hb  

      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          

      without foreign body of other part of head; Superficial injury of head. Condition is        

      Stable. Discharge Instructions: Head Injury, Adult, Facial Laceration, Facial               

      Laceration, Easy-to-Read, Fall Prevention in the Home, Easy-to-Read, Head Injury,           

      Adult, Easy-to-Read, Nonepileptic Seizures, Seizure, Adult, Seizure, Adult,                 

      Easy-to-Read. Prescriptions for Keppra 500 mg Oral Tablet - take 1 tablet by ORAL route     

      every 12 hours; 20 tablet. and Forms are Medication Reconciliation Form, Thank You          

      Letter, Antibiotic Education, Prescription Opioid Use. Follow up: Private Physician;        

      When: 2 - 3 days; Reason: Recheck today's complaints, Continuance of care,                  

      Re-evaluation by your physician. Follow up: Rodrigo Hope; When: 2 - 3 days; Reason:      

      Recheck today's complaints, Re-evaluation by your physician. Problem is new. Symptoms       

      have improved. chapin                                                                          

                                                                                                  

**************************************************************************************************

## 2020-05-14 NOTE — XMS REPORT
Summary of Care

                            Created on:2020



Patient:Radha Garza

Sex:Female

:1945

External Reference #:[id number]





Demographics







                          Address                   [address]



                                                    [place] [address]

 

                          Home Phone                [phone]

 

                          Mobile Phone              [phone]

 

                          Phone                     [phone]

 

                          Email Address             [email or web address]

 

                          Preferred Language        English

 

                          Marital Status            

 

                          Judaism Affiliation     Unknown

 

                          Race                      White

 

                          Ethnic Group              Not  or 









Author







                          Organization              [facility]

 

                          Address                   [address] [place] [address]









Support







                Name            Relationship    Address         Phone

 

                Yin Boss     Unavailable     [address]   [phone]



                                                [place] [address] 

 

                Ryan Garza Unavailable     [address] [facility] [phone]



                                                [place] [address] 









Care Team Providers







                    Name                Role                Phone

 

                    MD Davis         Primary Care Provider [phone]









Reason for Visit







                          Reason                    Comments

 

                          Assessment                restlesss

 

                          Anxiety                   







Encounter Details







             Date         Type         Department   Care Team    Description

 

             2020   Telephone    [facility]  Darnell Rodriguez, Assess

ment



                                                            Neurology-[facility]



                                        (restlesss); Anxiety



                                                [address]



                                        



                                                            [address], [address]



                          [place]    [address] [phone] (Fax) 







Allergies







             Active Allergy Reactions    Severity     Noted Date   Comments

 

             Chocolate Flavor Anxiety                   2015   

 

             Nitrofurantoin Unknown - See              2015   



             Monohyd/M-Cryst comments                               

 

             Morphine     Nausea and/or              2017   



                          Vomiting                               

 

             Penicillin   Other - See comments              2017   Pt stat

es "several



                                                                 family members 

are



                                                                 allergic so i t

hink



                                                                 i am"



documented as of this encounter (statuses as of 2020)



Medications







          Medication Sig       Dispensed Refills   Start Date End Date  Status

 

          Cholecalciferol, Vitamin Take 1,000 Units           0                 

            Active



          D3, (VITAMIN D3) 1,000 by mouth every                                 

        



          unit capsule morning.                                          

 

          lovastatin 20 mg tablet Take 20 mg by           0         2017  

         Active



                    mouth daily.                                         

 

          ciclopirox 8 % Apply  to 6.6 mL    2         10/04/2017           Acti

ve



          solutionIndications: area(s) at                                       

  



          Onychomycosis bedtime. Apply                                         



                    to dry                                            



                    nail/surrounding                                         



                    skin. Reapply                                         



                    nightly. Remove                                         



                    with alcohol,                                         



                    trim/file nails                                         



                    on day 7. repeat                                         

 

          traMADOL (ULTRAM) 50 mg Take 1 tablet by 20 tablet 0         

8           Active



          tablet    mouth every 6                                         



                    (six) hours as                                         



                    needed for Pain                                         



                    (scale 4-6).                                         

 

          mupirocin 2 % Apply  to 22 g      0         2018           Activ

e



          ointmentIndications: area(s) 3                                        

 



          Abscess   (three) times                                         



                    daily.                                            

 

          rivaroxaban 15 mg tablet Take 1 tablet by           0         20

18           Active



                    mouth daily.                                         

 

          hydrocortisone Insert  into 30 g      1         2018           A

ctive



          (PROCTOSOL HC) 2.5 % rectum 2 (two)                                   

      



          rectal cream times daily.                                         

 

          levothyroxine 88 mcg TAKE 1 TABLET BY 90 tablet 1         10/29/2019  

         Active



          tabletIndications: MOUTH ONCE DAILY                                   

      



          Hypothyroidism, IN THE MORNING                                        

 



          unspecified type                                                   

 

          Ferrous Fumarate-Folic Take 1 tablet by 90 tablet 1         2019

           Active



          Acid (HEMATINIC/FOLIC mouth daily.                                    

     



          ACID) 324 mg (106 mg                                                  

 



          iron)-1 mg                                                   



          TabIndications: Iron                                                  

 



          deficiency anemia,                                                   



          unspecified iron                                                   



          deficiency anemia type                                                

   

 

          rivastigmine 4.6 mg/24 Apply 1 Patch to 30 Patch  1         2020

           Active



          hr patchIndications: skin daily.                                      

   



          Late onset Alzheimer's                                                

   



          disease without                                                   



          behavioral disturbance                                                

   

 

          metoprolol succinate XL Take 1 tablet by 30 tablet 0         

0           Active



          50 mg 24 hr mouth daily.                                         



          tabletIndications:                                                   



          Dizziness, PAF                                                   



          (paroxysmal atrial                                                   



          fibrillation)                                                   

 

          vitamin B-12 1,000 mcg Take 1 tablet by 30 tablet 0         2020

           Active



          tabletIndications: mouth daily.                                       

  



          Dizziness, PAF                                                   



          (paroxysmal atrial                                                   



          fibrillation)                                                   

 

          mirtazapine 7.5 mg Take 1 tablet by 30 tablet 1         2020    

       Active



          tablet    mouth at                                          



                    bedtime.                                          



documented as of this encounter (statuses as of 2020)



Active Problems







                          Problem                   Noted Date

 

                          LOC (loss of consciousness) 2020

 

                          PAF (paroxysmal atrial fibrillation) 2020

 

                          Orthostatic hypotension   2020

 

                          Dizziness                 2020

 

                          S/p reverse total shoulder arthroplasty 2017

 

                          Right arm pain            2017

 

                          Essential hypertension    03/10/2016

 

                          Hyperlipemia              03/10/2016

 

                          Hypothyroidism            03/10/2016

 

                          Iron (Fe) deficiency anemia 03/10/2016

 

                          Vitamin D deficiency      03/10/2016



documented as of this encounter (statuses as of 2020)



Immunizations







                    Name                Administration Dates Next Due

 

                    Influenza High Dose 2019, 10/12/2017 

 

                    Pneumococcal Polysaccharide, PPSV23 (PNEUMOVAX) 2019  

        



documented as of this encounter



Social History







             Tobacco Use  Types        Packs/Day    Years Used   Date

 

             Never Smoker Cigarettes                1            Quit: 03/10/198

0









                Smokeless Tobacco: Never Used                                 









                Alcohol Use     Drinks/Week     oz/Week         Comments

 

                Yes                                             Occasional Drink

er









                          Sex Assigned at Birth     Date Recorded

 

                          Not on file               









                    Job Start Date      Occupation          Industry

 

                    Not on file         Not on file         Not on file









                    Travel History      Travel Start        Travel End









                                        No recent travel history available.



documented as of this encounter



Last Filed Vital Signs

Not on filedocumented in this encounter



Plan of Treatment







                Health Maintenance Due Date        Last Done       Comments

 

                DTaP,Tdap,and Td Vaccines (1 - Tdap) 1954                 

     

 

                Zoster Recombinant Vaccine (SHINGRIX) (1 1993             

         



                of 2)                                           

 

                Medicare Wellness Visit 2008                      

 

                Osteoporosis Screening 2008                      

 

                PNEUMOCOCCAL VACCINES 65+ (2 of 2 - PCV13) 2020      

 

                INFLUENZA VACCINE Completed       2019, 10/12/2017 



documented as of this encounter



Implants







          Implanted Type      Area       Device    Shelf     Model /



                                                  Identifier Expiration Serial /

 Lot



                                                            Date      

 

          Cement    CEMENT    Right:    Biomet              2018 [id number]

 /



                                        Implanted: Qty: 1 on 2017 by Greg Garcia MD at [facility]            Shoulder                                    6

[id number] /



                                                                      [id number]

 

          Cement    CEMENT    Right:    Biomet              2018 6195-1-00

1 /



                                        Implanted: Qty: 1 on 2017 by Greg Garcia MD at [facility]            Shoulder                                    6

[id number] /



                                                                      [id number]

 

          Central Screw SCREW     Right:    Biomet              08/10/2027 [id number]

5 /



                                        Implanted: Qty: 1 on 2017 by Greg Garcia MD at [facility]            Shoulder                                    7

[id number] /



                                                                      [id number]

 

          Locking Screw SCREW     Right:    Biomet              2027 91176

0 /



                                        Implanted: Qty: 1 on 2017 by Greg Garcia MD at [facility]            Shoulder                                    4

[id number] /



                                                                      [id number]

 

          Locking Screw SCREW     Right:    Biomet              2027 50496

0 /



                                        Implanted: Qty: 1 on 2017 by Greg Garcia MD at [facility]            Shoulder                                    2

[id number] /



                                                                      [id number]

 

          Nonlocking Screw SCREW     Right:    Biomet              2027 18

0557 /



                                        Implanted: Qty: 1 on 2017 by Greg Garcia MD at [facility]            Shoulder                                    8

[id number] /



                                                                      [id number]

 

          Nonlocking Screw SCREW     Right:    Biomet              2027 18

0557 /



                                        Implanted: Qty: 1 on 2017 by Greg Garcia MD at [facility]            Shoulder                                    4

[id number] /



                                                                      [id number]

 

          Bone Cement Restrictor SHOULDER  Right:    Biomet              

021 [id number] /



                                        Implanted: Qty: 1 on 2017 by Greg Garcia MD at [facility]            Shoulder                                    3

[id number] /



                                                                      [id number]

 

          Humeral Bearing SHOULDER  Right:    Biomet              2022 XL-

[id number] /



                                        Implanted: Qty: 1 on 2017 by Greg Garcia MD at [facility]            Shoulder                                    2

[id number] /



                                                                      [id number]

 

          Humeral Tray With Locking Ring SHOULDER  Right:    Biomet             

 2027 [id number] /



                                        Implanted: Qty: 1 on 2017 by Greg Garcia MD at [facility]            Shoulder                                    2

[id number] /



                                                                      [id number]

 

          Mini Baseplate SHOULDER  Right:    Biomet              2027 0100

[id number] /



                                        Implanted: Qty: 1 on 2017 by Greg Garcia MD at [facility]            Shoulder                                    1

[id number] /



                                                                      [id number]

 

          Glenosphere SHOULDER  Right:    Biomet              2027 [id number] 

/



                                        Implanted: Qty: 1 on 2017 by Greg Garcia MD at [facility]            Shoulder                                    7

[id number] /



                                                                      [id number]

 

          Humeral Fracture Stem Stem      Right:    Biomet              20

27 [id number] /



                                        Implanted: Qty: 1 on 2017 by Greg Garcia MD at [facility]            Shoulder                                    4

[id number] /



                                                                      [id number]



documented as of this encounter



Results

Not on filedocumented in this encounter



Insurance







          Payer     Benefit Plan / Subscriber ID Effective Dates Phone     Addre

ss   Type



                    Group                                             

 

          MEDICARE  MEDICARE PART xxxxxxxxxxx 2008-Teresa [phone] [address] 

Medicare



                      A & B                 t          2          [id number]



                                        



                                                            [place] 



                                                            [id number] 



documented as of this encounter

## 2020-05-14 NOTE — ER
Nurse's Notes                                                                                     

 [facility]                                                                 

Name: Radha Garza                                                                            

Age: 74 yrs                                                                                       

Sex: Female                                                                                       

: 1945                                                                                   

MRN: [id number]                                                                                   

Arrival Date: 2020                                                                          

Time: 08:48                                                                                       

Account#: [id number]                                                                            

Bed 18                                                                                            

Private MD:                                                                                       

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  

  intractable;Laceration without foreign body of other part of head;Superficial injury            

  of head                                                                                         

                                                                                                  

Presentation:                                                                                     

                                                                                             

08:51 Chief complaint: EMS states: called out for laceration after having a seizure, has hx   em  

      of seizures, hematoma and small laceration noted to top of head, no hx of blood             

      thinners, pt reports back pain. Coronavirus screen: Patient denies a cough. Patient         

      denies shortness of breath or difficulty breathing. Patient denies measured and/or          

      subjective temperature greater than 100.4F prior to today's visit. Patient denies           

      travel on a cruise ship or to a country the [facility] currently lists as an affected area.        

      Patient denies contact with known and/or suspected case of COVID-19. Ebola Screen:          

      Patient negative for fever greater than or equal to 101.5 degrees Fahrenheit, and           

      additional compatible Ebola Virus Disease symptoms Patient denies exposure to               

      infectious person. Patient denies travel to an Ebola-affected area in the 21 days           

      before illness onset. No symptoms or risks identified at this time. Initial Sepsis          

      Screen: Does the patient meet any 2 criteria? No. Patient's initial sepsis screen is        

      negative. Does the patient have a suspected source of infection? No. Patient's initial      

      sepsis screen is negative. Risk Assessment: Do you want to hurt yourself or someone         

      else? Patient reports no desire to harm self or others. Onset of symptoms was May 14,       

      2020.                                                                                       

08:51 Method Of Arrival: EMS: [facility]                                                       em  

08:51 Acuity: OZZIE 3                                                                           em  

                                                                                                  

Historical:                                                                                       

- Allergies:                                                                                      

09:01 Morphine;                                                                               em  

09:01 PENICILLINS;                                                                            em  

09:01 Macrobid;                                                                               em  

09:01 Chocolate;                                                                              em  

- Home Meds:                                                                                      

09:01 metoprolol tartrate 50 mg Oral tab [Active]; magnesium oxide 400 mg Oral tab [Active];  em  

      Klor-Con M20 20 mEq Oral TbTQ 1 tab once daily [Active]; Synthroid 100 mcg Oral tab 1       

      tab once daily [Active]; tramadol 50 mg Oral tab 1 tab every 6 hours [Active];              

- PMHx:                                                                                           

09:01 Anemia; Hypothyroidism; Hyperlipidemia; Hypertension; Atrial Fib; convulsions;          em  

      traumatic subdural hemorrhage; hypokalemia;                                                 

                                                                                                  

- Immunization history:: Adult Immunizations up to date.                                          

- Social history:: Smoking status: unknown.                                                       

                                                                                                  

                                                                                                  

Screenin:51 Abuse screen: no apparent signs noted. Nutritional screening: No deficits noted.        em  

      Tuberculosis screening: No symptoms or risk factors identified. Fall Risk Fall in past      

      12 months (25 points). Secondary diagnosis (15 points) seizures, dementia, impaired         

      mobility, Total Preston Fall Scale indicates Low Risk Score (25-44 pts). Side Rails Up X      

      2 Placed close to Nursing Station.                                                          

                                                                                                  

Assessment:                                                                                       

12:50 Reassessment: awaiting transport from [facility], diagnostics faxed to [facility].                 iw  

13:05 Reassessment: Patient appears in no apparent distress at this time. Patient and/or      iw  

      family updated on plan of care and expected duration. Pain level reassessed.                

                                                                                                  

Vital Signs:                                                                                      

08:51  / 61; Pulse 77; Resp 18; Temp 99.0(O); Pulse Ox 96% on R/A; Weight 58.97 kg;     em  

      Height 5 ft. 3 in. (160.02 cm);                                                             

08:51 Body Mass Index 23.03 (58.97 kg, 160.02 cm)                                             em  

                                                                                                  

ED Course:                                                                                        

08:48 Patient arrived in ED.                                                                  em  

08:51 Patient has correct armband on for positive identification. Placed in gown. Bed in low  em  

      position. Call light in reach. Side rails up X2. Pulse ox on. NIBP on.                      

08:52 Tomer Polk MD is Attending Physician.                                             [facility] 

08:53 Triage completed.                                                                       em  

09:01 Arm band placed on.                                                                     em  

09:22 Seamus Blue, RN is Primary Nurse.                                                      em  

09:30 EKG done, by EKG tech. reviewed by Tomer Polk MD.                                   tc  

09:45 CT Head C Spine In Process Unspecified.                                                 EDMS

11:56 Rodrigo Hope MD is Referral Physician.                                             [facility] 

13:22 Primary Nurse role handed off by Seamus Blue, RN                                       iw  

13:22 Emmie Ricks, RN is Primary Nurse.                                                   iw  

                                                                                                  

Administered Medications:                                                                         

09:35 Drug: NS 0.9% 500 ml Route: IV; Rate: bolus; Site: right forearm;                       em  

09:37 Drug: Tetanus-Diphtheria Toxoid Adult 0.5 ml {: [employer]. Exp:         em  

      2022. Lot #: [id number]. } Route: IM; Site: right deltoid;                                 

10:25 Drug: Keppra 500 mg Route: IV; Rate: per protocol; Site: right antecubital;               

11:30 Drug: Lidocaine-Epinephrine -2 % (1:100,000) 10 ml Route: Infiltration;                   

                                                                                                  

                                                                                                  

Outcome:                                                                                          

11:56 Discharge ordered by MD. samson 

14:59 Patient left the ED.                                                                    hb  

                                                                                                  

Signatures:                                                                                       

Dispatcher [employer]                           EDTomer Goodwin MD MD cha Munoz, Edgar RN                        Emmie Yen RN RN iw Callis, Tiffany, JOANG Tech               EKG [facility]                                                   

Ling Roberts RN RN hb Harris, Amy, RN RN                                                      

                                                                                                  

**************************************************************************************************

## 2020-05-14 NOTE — XMS REPORT
Summary of Care

                            Created on:2020



Patient:Radha Garza

Sex:Female

:1945

External Reference #:[id number]





Demographics







                          Address                   [address]



                                                    [place] [address]

 

                          Home Phone                [phone]

 

                          Mobile Phone              [phone]

 

                          Phone                     [phone]

 

                          Email Address             [email or web address]

 

                          Preferred Language        English

 

                          Marital Status            

 

                          Restoration Affiliation     Unknown

 

                          Race                      White

 

                          Ethnic Group              Not  or 









Author







                          Organization              [facility]

 

                          Address                   [address] [place] [address]









Support







                Name            Relationship    Address         Phone

 

                Yin Boss     Unavailable     [address]   [phone]



                                                [place] [address] 

 

                Ryan Garza Unavailable     [address] [facility] [phone]



                                                [place] [address] 









Care Team Providers







                    Name                Role                Phone

 

                    MD Davis         Primary Care Provider [phone]









Reason for Referral

Radiology Services (STAT)





           Status     Reason     Specialty  Diagnoses / Referred By Referred To



                                            Procedures Contact    Contact

 

                New Request                     Diagnostic      Diagnoses



Toe dislocation, left, initial encounter Lit Owusu R,           



                                                Radiology       



Procedures



XR FOOT <3 VW LEFT                      FNP



                                        



                                                                 [address], [place] 



                                                                 [address]



                                        



                                                       Phone:     



                                                                 [phone]



                                        



                                                       Fax:       



                                                       [phone] 





Radiology Services (STAT)





           Status     Reason     Specialty  Diagnoses / Referred By Referred To



                                            Procedures Contact    Contact

 

                New Request                     Diagnostic      Diagnoses



Altered mental status, unspecified altered mental status type



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



Left foot pain



Fall, initial encounter



Injury of head, initial encounter Lit Owusu R,           



                                                Radiology       



Procedures



XR CHEST 1 VW



XR CHEST 2 VW                           FNP



                                        



                                                                 [address], [place] 



                                                                 [address]



                                        



                                                       Phone:     



                                                                 [phone]



                                        



                                                       Fax:       



                                                       [phone] 





Radiology Services (STAT)





           Status     Reason     Specialty  Diagnoses / Referred By Referred To



                                            Procedures Contact    Contact

 

                New Request                     Diagnostic      Diagnoses



Altered mental status, unspecified altered mental status type



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



Left foot pain



Fall, initial encounter



Injury of head, initial encounter Lit Owusu R,           



                                                Radiology       



Procedures



XR FOOT 3+ VW LEFT                      FNP



                                        



                                                                 [address], [place] 



                                                                 [address]



                                        



                                                       Phone:     



                                                                 [phone]



                                        



                                                       Fax:       



                                                       [phone] 





MRI/CAT Scan (STAT)





           Status     Reason     Specialty  Diagnoses / Referred By Referred To



                                            Procedures Contact    Contact

 

                New Request                     Diagnostic      Diagnoses



Altered mental status, unspecified altered mental status type



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



Left foot pain



Fall, initial encounter



Injury of head, initial encounter Lit Owusu,           



                                                Radiology       



Procedures



CT CERVICAL SPINE WO CONTRAST           FNP



                                        



                                                                 [address], [place] 



                                                                 [address]



                                        



                                                       Phone:     



                                                                 [phone]



                                        



                                                       Fax:       



                                                       [phone] 





MRI/CAT Scan (STAT)





           Status     Reason     Specialty  Diagnoses / Referred By Referred To



                                            Procedures Contact    Contact

 

                New Request                     Diagnostic      Diagnoses



Altered mental status, unspecified altered mental status type



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



Left foot pain



Fall, initial encounter



Injury of head, initial encounter Lit Owusu,           



                                                Radiology       



Procedures



CT HEAD WO CONTRAST                     FNP



                                        



                                                                 [address], [place] 



                                                                 [address]



                                        



                                                       Phone:     



                                                                 [phone]



                                        



                                                       Fax:       



                                                       [phone] 









Reason for Visit







                          Reason                    Comments

 

                          Fall                      



Auth/Cert





           Status     Reason     Specialty  Diagnoses / Referred By Referred To



                                            Procedures Contact    Contact

 

                                 Emergency Medicine                       [facility]

ergency



                                                                  Dept







                                                                  [address] [facility]



                                                                  







                                                                  [place] [address]







                                                                  Phone:



                                                                  [phone]







                                                                  Fax: [phone]

[phone]









Encounter Details







             Date         Type         Department   Care Team    Description

 

             2020   Emergency    [facility] Lit Owusu, Injury of h

ead, initial 

encounter (Primary Dx);



                                                            Department



                                        FNP



                                        Altered mental status, unspecified alter

ed mental status type;



                                                            [address] [place] [address] 



                                        [address]



                                        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

rbance;



                                                            [address]             Left foot pain;



                                                [phone]



                                        Fall, initial encounter;



                                                                [phone]



                                        Toe dislocation, left, initial encounter

;



                                                    [phone] (Fax) Displaced

 fracture of proximal phalanx of right great toe,

initial encounter for closed fracture;



                                                                 Contusion of sc

alp, initial encounter







Allergies







             Active Allergy Reactions    Severity     Noted Date   Comments

 

             Chocolate Flavor Anxiety                   2015   

 

             Nitrofurantoin Unknown - See              2015   



             Monohyd/M-Cryst comments                               

 

             Morphine     Nausea and/or              2017   



                          Vomiting                               

 

             Penicillin   Other - See comments              2017   Pt stat

es "several



                                                                 family members 

are



                                                                 allergic so i t

hink



                                                                 i am"



documented as of this encounter (statuses as of 2020)



Medications







          Medication Sig       Dispensed Refills   Start Date End Date  Status

 

          Cholecalciferol, Vitamin Take 1,000 Units           0                 

            Active



          D3, (VITAMIN D3) 1,000 by mouth every                                 

        



          unit capsule morning.                                          

 

          lovastatin 20 mg tablet Take 20 mg by           0         2017  

         Active



                    mouth daily.                                         

 

          ciclopirox 8 % Apply  to 6.6 mL    2         10/04/2017           Acti

ve



          solutionIndications: area(s) at                                       

  



          Onychomycosis bedtime. Apply                                         



                    to dry                                            



                    nail/surrounding                                         



                    skin. Reapply                                         



                    nightly. Remove                                         



                    with alcohol,                                         



                    trim/file nails                                         



                    on day 7. repeat                                         

 

          traMADOL (ULTRAM) 50 mg Take 1 tablet by 20 tablet 0         

8           Active



          tablet    mouth every 6                                         



                    (six) hours as                                         



                    needed for Pain                                         



                    (scale 4-6).                                         

 

          mupirocin 2 % Apply  to 22 g      0         2018           Activ

e



          ointmentIndications: area(s) 3                                        

 



          Abscess   (three) times                                         



                    daily.                                            

 

          rivaroxaban 15 mg tablet Take 1 tablet by           0         20

18           Active



                    mouth daily.                                         

 

          hydrocortisone Insert  into 30 g      1         2018           A

ctive



          (PROCTOSOL HC) 2.5 % rectum 2 (two)                                   

      



          rectal cream times daily.                                         

 

          levothyroxine 88 mcg TAKE 1 TABLET BY 90 tablet 1         10/29/2019  

         Active



          tabletIndications: MOUTH ONCE DAILY                                   

      



          Hypothyroidism, IN THE MORNING                                        

 



          unspecified type                                                   

 

          Ferrous Fumarate-Folic Take 1 tablet by 90 tablet 1         2019

           Active



          Acid (HEMATINIC/FOLIC mouth daily.                                    

     



          ACID) 324 mg (106 mg                                                  

 



          iron)-1 mg                                                   



          TabIndications: Iron                                                  

 



          deficiency anemia,                                                   



          unspecified iron                                                   



          deficiency anemia type                                                

   

 

          rivastigmine 4.6 mg/24 Apply 1 Patch to 30 Patch  1         2020

           Active



          hr patchIndications: skin daily.                                      

   



          Late onset Alzheimer's                                                

   



          disease without                                                   



          behavioral disturbance                                                

   

 

          metoprolol succinate XL Take 1 tablet by 30 tablet 0         

0           Active



          50 mg 24 hr mouth daily.                                         



          tabletIndications:                                                   



          Dizziness, PAF                                                   



          (paroxysmal atrial                                                   



          fibrillation)                                                   

 

          vitamin B-12 1,000 mcg Take 1 tablet by 30 tablet 0         2020

           Active



          tabletIndications: mouth daily.                                       

  



          Dizziness, PAF                                                   



          (paroxysmal atrial                                                   



          fibrillation)                                                   



documented as of this encounter (statuses as of 2020)



Active Problems







                          Problem                   Noted Date

 

                          LOC (loss of consciousness) 2020

 

                          PAF (paroxysmal atrial fibrillation) 2020

 

                          Orthostatic hypotension   2020

 

                          Dizziness                 2020

 

                          S/p reverse total shoulder arthroplasty 2017

 

                          Right arm pain            2017

 

                          Essential hypertension    03/10/2016

 

                          Hyperlipemia              03/10/2016

 

                          Hypothyroidism            03/10/2016

 

                          Iron (Fe) deficiency anemia 03/10/2016

 

                          Vitamin D deficiency      03/10/2016



documented as of this encounter (statuses as of 2020)



Immunizations







                    Name                Administration Dates Next Due

 

                    Influenza High Dose 2019, 10/12/2017 

 

                    Pneumococcal Polysaccharide, PPSV23 (PNEUMOVAX) 2019  

        



documented as of this encounter



Social History







             Tobacco Use  Types        Packs/Day    Years Used   Date

 

             Never Smoker Cigarettes                1            Quit: 03/10/198

0









                Smokeless Tobacco: Never Used                                 









                Alcohol Use     Drinks/Week     oz/Week         Comments

 

                Yes                                             Occasional Drink

er









                          Sex Assigned at Birth     Date Recorded

 

                          Not on file               









                    Job Start Date      Occupation          Industry

 

                    Not on file         Not on file         Not on file









                    Travel History      Travel Start        Travel End









                                        No recent travel history available.



documented as of this encounter



Last Filed Vital Signs







                Vital Sign      Reading         Time Taken      Comments

 

                Blood Pressure  179/68          2020 11:00 AM CDT 

 

                Pulse           69              2020 11:00 AM CDT 

 

                Temperature     37.1 C (98.7 F) 2020 10:01 AM CDT 

 

                Respiratory Rate 15              2020 11:00 AM CDT 

 

                Oxygen Saturation 98%             2020 11:00 AM CDT 

 

                Inhaled Oxygen Concentration -               -               

 

                Weight          72.6 kg (160 lb) 2020 10:01 AM CDT 

 

                Height          -               -               

 

                Body Mass Index 28.34           2020  3:33 PM CST 



documented in this encounter



Discharge Instructions

Lit Venegas FNP - 2020Formatting of this note might be 
different from the original.

DIAGNOSIS

1. Head contusion

2. Head injury

3. Toe fracture, closed



NO LIFE-THREATENING FINDINGS ON TODAY'S EXAM.



PROCEDURES IN THE ER TODAY:

Orders Placed This Encounter

Procedures

 CT HEAD WO CONTRAST

 CT CERVICAL SPINE WO CONTRAST

 XR FOOT 3+ VW LEFT

 XR CHEST 1 VW

 XR FOOT &lt;3 VW LEFT

 CBC WITH DIFF

 COMP. METABOLIC PANEL (08357)

 TROPONIN I

 PROTHROMBIN TIME / INR

 aPTT

 URINALYSIS

 CBC WITH DIFFERENTIAL



MEDICATIONS ADMINISTERED IN THE ER TODAY:

Medications - No data to display



YOUR PRESCRIPTIONS AND OVER-THE-COUNTER MEDICATION RECOMMENDATIONS:

New Prescriptions

 No medications on file



SPECIAL CARE INSTRUCTIONS:

Tylenol for pain.

Ice for head and face and toe



FOLLOW-UP RECOMMENDATIONS:

RECOMMEND FOLLOW-UP WITH A PRIMARY CARE PROVIDER OR SPECIALIST IN 2-5 DAYS, 
ESPECIALLY IF NO IMPROVEMENT IN SYMPTOMS.



TO FOLLOW-UP WITHIN THE [facility], TRY THESE OPTIONS (CLINIC 
APPOINTMENTS AVAILABLE ON CASE-BY-CASE BASIS):

1. SCHEDULE AN APPOINTMENT ONLINE AT [email or web address]

2. OR CALL THE [facility] ACCESS CENTER AT [phone] OR [phone]

3. OR CALL YOUR [facility] PHYSICIAN'S OFFICE DIRECTLY IF YOU ARE ALREADY AN 
ESTABLISHED [facility] PATIENT.



OR, YOU MAY FOLLOW-UP WITH A PROVIDER OF YOUR CHOICE, SUCH AS:

1. A PHYSICIAN OF YOUR CHOICE

2. [facility], [phone]. LOCATIONS IN [facility]

3. [facility], [address] [place] [address]; 
[phone]



RETURN TO ER FOR WORSENING OF SYMPTOMS.





AttachmentsThe following attachments cannot be sent through Care Everywhere.
Bruises (Contusions) (English)Scalp Contusion (English)Fracture, Toe, Closed 
(English)documented in this encounter



Plan of Treatment







                Health Maintenance Due Date        Last Done       Comments

 

                DTaP,Tdap,and Td Vaccines (1 - Tdap) 1954                 

     

 

                Zoster Recombinant Vaccine (SHINGRIX) (1 1993             

         



                of 2)                                           

 

                Medicare Wellness Visit 2008                      

 

                Osteoporosis Screening 2008                      

 

                PNEUMOCOCCAL VACCINES 65+ (2 of 2 - PCV13) 2020      

 

                INFLUENZA VACCINE Completed       2019, 10/12/2017 



documented as of this encounter



Implants







          Implanted Type      Area       Device    Shelf     Model /



                                                  Identifier Expiration Serial /

 Lot



                                                            Date      

 

          Cement    CEMENT    Right:    Biomet              2018 [id number]

 /



                                        Implanted: Qty: 1 on 2017 by Greg Garcia MD at [facility]            Shoulder                                    6

[id number] /



                                                                      [id number]

 

          Cement    CEMENT    Right:    Biomet              2018 [id number]

1 /



                                        Implanted: Qty: 1 on 2017 by Greg Garcia MD at [facility]            Shoulder                                    6

[id number] /



                                                                      [id number]

 

          Central Screw SCREW     Right:    Bi[employer]              08/10/2027 [id number]

5 /



                                        Implanted: Qty: 1 on 2017 by Greg Garcia MD at [facility]            Shoulder                                    7

[id number] /



                                                                      [id number]

 

          Locking Screw SCREW     Right:    [employer]              2027 [id number]

0 /



                                        Implanted: Qty: 1 on 2017 by Greg Garcia MD at [facility]            Shoulder                                    4

[id number] /



                                                                      [id number]

 

          Locking Screw SCREW     Right:    Biomet              2027 [id number]

0 /



                                        Implanted: Qty: 1 on 2017 by Greg Garcia MD at [facility]            Shoulder                                    2

[id number] /



                                                                      [id number]

 

          Nonlocking Screw SCREW     Right:    Biomet              2027 18

[id number] /



                                        Implanted: Qty: 1 on 2017 by Greg Garcia MD at [facility]            Shoulder                                    8

[id number] /



                                                                      [id number]

 

          Nonlocking Screw SCREW     Right:    Biomet              2027 18

[id number] /



                                        Implanted: Qty: 1 on 2017 by Greg Garcia MD at [facility]            Shoulder                                    4

[id number] /



                                                                      [id number]

 

          Bone Cement Restrictor SHOULDER  Right:    Biomet              

[id number] /



                                        Implanted: Qty: 1 on 2017 by Greg Garcia MD at [facility]            Shoulder                                    3

[id number] /



                                                                      [id number]

 

          Humeral Bearing SHOULDER  Right:    Biomet              2022 XL-

[id number] /



                                        Implanted: Qty: 1 on 2017 by Greg Garcia MD at [facility]            Shoulder                                    2

[id number] /



                                                                      [id number]

 

          Humeral Tray With Locking Ring SHOULDER  Right:    [employer]             

 2027 [id number] /



                                        Implanted: Qty: 1 on 2017 by Greg Garcia MD at [facility]            Shoulder                                    2

[id number] /



                                                                      [id number]

 

          Mini Baseplate SHOULDER  Right:    [employer]              2027 0100

[id number] /



                                        Implanted: Qty: 1 on 2017 by Greg Garcia MD at [facility]            Shoulder                                    1

[id number] /



                                                                      [id number]

 

          Glenosphere SHOULDER  Right:    [employer]              2027 [id number] 

/



                                        Implanted: Qty: 1 on 2017 by Greg Garcia MD at [facility]            Shoulder                                    7

[id number] /



                                                                      [id number]

 

          Humeral Fracture Stem Stem      Right:    [employer]              20 [id number] /



                                        Implanted: Qty: 1 on 2017 by Greg Garcia MD at [facility]            Shoulder                                    4

[id number] /



                                                                      [id number]



documented as of this encounter



Procedures







             Procedure Name Priority     Date/Time    Associated Diagnosis Comme

nts

 

             CONSENT/REFUSAL FOR Routine      2020  1:09              



             DIAGNOSIS AND              PM CDT                    



             TREATMENT                                           

 

             XR FOOT <3 VW LEFT STAT         2020 11:40 Toe dislocation, R

esults for this



                                       AM CDT       left, initial procedure are 

in



                                                    encounter    the results



                                                                 section.

 

             URINALYSIS   STAT         2020 11:11 Altered mental Results f

or this



                                       AM CDT       status, unspecified procedur

e are in



                                                    altered mental the results



                                                                status type



                                        section.



                                                    Senile dementia 



                                                    without behavioral 



                                                                disturbance



                                        



                                                                Left foot pain



                                        



                                                    Fall, initial 



                                                                encounter



                                        



                                                    Injury of head, 



                                                    initial encounter 

 

             CT HEAD WO CONTRAST STAT         2020 11:02 Altered mental Re

sults for this



                                       AM CDT       status, unspecified procedur

e are in



                                                    altered mental the results



                                                                status type



                                        section.



                                                    Senile dementia 



                                                    without behavioral 



                                                                disturbance



                                        



                                                                Left foot pain



                                        



                                                    Fall, initial 



                                                                encounter



                                        



                                                    Injury of head, 



                                                    initial encounter 

 

             CT CERVICAL SPINE WO STAT         2020 11:02 Altered mental R

esults for this



             CONTRAST                  AM CDT       status, unspecified procedur

e are in



                                                    altered mental the results



                                                                status type



                                        section.



                                                    Senile dementia 



                                                    without behavioral 



                                                                disturbance



                                        



                                                                Left foot pain



                                        



                                                    Fall, initial 



                                                                encounter



                                        



                                                    Injury of head, 



                                                    initial encounter 

 

             XR FOOT  3+ VW LEFT STAT         2020 10:39 Altered mental Re

sults for this



                                       AM CDT       status, unspecified procedur

e are in



                                                    altered mental the results



                                                                status type



                                        section.



                                                    Senile dementia 



                                                    without behavioral 



                                                                disturbance



                                        



                                                                Left foot pain



                                        



                                                    Fall, initial 



                                                                encounter



                                        



                                                    Injury of head, 



                                                    initial encounter 

 

             XR CHEST 1 VW STAT         2020 10:39 Altered mental Results 

for this



                                       AM CDT       status, unspecified procedur

e are in



                                                    altered mental the results



                                                                status type



                                        section.



                                                    Senile dementia 



                                                    without behavioral 



                                                                disturbance



                                        



                                                                Left foot pain



                                        



                                                    Fall, initial 



                                                                encounter



                                        



                                                    Injury of head, 



                                                    initial encounter 

 

             CBC WITH DIFFERENTIAL STAT         2020 10:19 Altered mental 

Results for this



                                       AM CDT       status, unspecified procedur

e are in



                                                    altered mental the results



                                                                status type



                                        section.



                                                    Senile dementia 



                                                    without behavioral 



                                                                disturbance



                                        



                                                                Left foot pain



                                        



                                                    Fall, initial 



                                                                encounter



                                        



                                                    Injury of head, 



                                                    initial encounter 

 

             ACTIVATED PARTIAL STAT         2020 10:19 Altered mental Resu

lts for this



             THRMPLAS GONZÁLEZ              AM CDT       status, unspecified procedur

e are in



                                                    altered mental the results



                                                                status type



                                        section.



                                                    Senile dementia 



                                                    without behavioral 



                                                                disturbance



                                        



                                                                Left foot pain



                                        



                                                    Fall, initial 



                                                                encounter



                                        



                                                    Injury of head, 



                                                    initial encounter 

 

             PROTHROMBIN TIME / STAT         2020 10:19 Altered mental Res

ults for this



             INR                       AM CDT       status, unspecified procedur

e are in



                                                    altered mental the results



                                                                status type



                                        section.



                                                    Senile dementia 



                                                    without behavioral 



                                                                disturbance



                                        



                                                                Left foot pain



                                        



                                                    Fall, initial 



                                                                encounter



                                        



                                                    Injury of head, 



                                                    initial encounter 

 

             CBC WITH DIFFERENTIAL STAT         2020 10:19 Altered mental 

Results for this



                                       AM CDT       status, unspecified procedur

e are in



                                                    altered mental the results



                                                                status type



                                        section.



                                                    Senile dementia 



                                                    without behavioral 



                                                                disturbance



                                        



                                                                Left foot pain



                                        



                                                    Fall, initial 



                                                                encounter



                                        



                                                    Injury of head, 



                                                    initial encounter 

 

             COMP. METABOLIC PANEL STAT         2020 10:19 Altered mental 

Results for this



             ([id number])                   AM CDT       status, unspecified procedur

e are in



                                                    altered mental the results



                                                                status type



                                        section.



                                                    Senile dementia 



                                                    without behavioral 



                                                                disturbance



                                        



                                                                Left foot pain



                                        



                                                    Fall, initial 



                                                                encounter



                                        



                                                    Injury of head, 



                                                    initial encounter 

 

             TROPONIN I   STAT         2020 10:19 Altered mental Results f

or this



                                       AM CDT       status, unspecified procedur

e are in



                                                    altered mental the results



                                                                status type



                                        section.



                                                    Senile dementia 



                                                    without behavioral 



                                                                disturbance



                                        



                                                                Left foot pain



                                        



                                                    Fall, initial 



                                                                encounter



                                        



                                                    Injury of head, 



                                                    initial encounter 

 

             EKG-12 LEAD  ASAP         2020 10:14              



                                       AM CDT                    



documented in this encounter



Results

XR FOOT &lt;3 VW LEFT (2020 11:40 AM CDT)





                                        Specimen

 

                                        









                          Impressions               Performed At

 

                                         



                                        PACS/VR/DOSE



                                        Successful reduction of the second toe P

1 fracture.



                                        



                                         



                                        



                                        Preliminary Report Dictated by Resident:

 Angel Moran I, Willi Jaramillo MD., have reviewed thi

s study and agree with the above



                                        



                          report.                   









                          Narrative                 Performed At

 

                                        EXAM:



                                        PACS/VR/DOSE



                                         



                                        



                                        XR FOOT <3 VW LEFT



                                        



                                         



                                        



                                        HISTORY:



                                        



                                         



                                        



                                        post reduction of 2nd toe on left foot. 



                                        



                                         



                                        



                                        COMPARISON:



                                        



                                         



                                        



                                        Same day left foot radiographs



                                        



                                         



                                        



                                        FINDINGS: 



                                        



                                         



                                        



                          Status post reduction radiographs demonstrate interval

 improved 



                                        alignment



                                        



                                        of the second toe P1 fracture, now in an

atomic alignment. Diffuse



                                        



                                        osteopenia is noted. Overlying soft tiss

ue swelling is present.



                                        



                                                    









                                        Procedure Note

 

                                        [facility], Radiant Results Inft User - 2020 12:42 PM CDT



EXAM:



                                        



                                        XR FOOT <3 VW LEFT



                                        



                                        HISTORY:



                                        



                                        post reduction of 2nd toe on left foot.



                                        



                                        COMPARISON:



                                        



                                        Same day left foot radiographs



                                        



                                        FINDINGS:



                                        



                                        Status post reduction radiographs demons

trate interval improved alignment



                                        of the second toe P1 fracture, now in an

atomic alignment. Diffuse



                                        osteopenia is noted. Overlying soft tiss

ue swelling is present.



                                        



                                        IMPRESSION



                                        



                                        Successful reduction of the second toe P

1 fracture.



                                        



                                        Preliminary Report Dictated by Resident:

 Angel Moran I, Willi Jaramillo MD., have reviewed this

 study and agree with the above



                                        report.









                Performing Organization Address         City/[facility]/[employer] Phone

 Number

 

                PACS/VR/DOSE                                    



URINALYSIS (2020 11:11 AM CDT)





             Component    Value        Ref Range    Performed At Pathologist Sig

nature

 

             APPEARANCE   Clear        Clear        [facility] 



                                                    LABORATORY   

 

             COLOR        Straw (A)    Yellow       [facility] 



                                                    LABORATORY   

 

             PH           7.0          4.8 - 8.0    [facility] 



                                                    LABORATORY   

 

             SP GRAVITY   1.005        1.003 - 1.030 [facility] 



                                                    LABORATORY   

 

             GLU U QUAL   Normal       Normal       [facility] 



                                                    LABORATORY   

 

             BLOOD        1+ (A)       Negative     [facility] 



                                                    LABORATORY   

 

             KETONES      Negative     Negative     [facility] 



                                                    LABORATORY   

 

             PROTEIN      Negative     Negative     [facility] 



                                                    LABORATORY   

 

             UROBILIN     Normal       Normal       [facility] 



                                                    LABORATORY   

 

             BILIRUBIN    Negative     Negative     [facility] 



                                                    LABORATORY   

 

             NITRITE      Negative     Negative     [facility] 



                                                    LABORATORY   

 

             LEUK TENZIN   Negative     Negative     [facility] 



                                                    LABORATORY   

 

             RBC/HPF      1            0 - 3 HPF    [facility] 



                                                    LABORATORY   

 

             WBC/HPF      1            0 - 5 HPF    [facility] 



                                                    LABORATORY   

 

             BACTERIA     Negative     Negative     [facility] 



                                                    LABORATORY   

 

             SQ EPITH     <1           HPF          [facility] 



                                                    LABORATORY   









                                        Specimen

 

                                        Urine - URINE, CATHETERIZED









                Performing Organization Address         City/[facility]/Zipcode Phone

 Number

 

                [facility] CLIA: [id number], [address], 

15 [phone]



                LAB[address]ATORY      [address]                  



CT CERVICAL SPINE WO CONTRAST (2020 11:02 AM CDT)





                                        Specimen

 

                                        









                          Impressions               Performed At

 

                                         



                                        PACS/VR/DOSE



                                        No acute intracranial findings.



                                        



                                         



                                        



                                        No acute osseous findings in the cervica

l spine.



                                        



                                         



                                        



                                         



                                        



                                                    









                          Narrative                 Performed At

 

                                        HISTORY: Head trauma, minor, GCS>=13, hi

gh clinical risk, initial exam



                                        PACS/VR/DOSE



                                        Altered mental status (AMS), unclear cau

se 



                                        



                                         



                                        



                                        TECHNIQUE:



                                        



                                        CT head without contrast.



                                        



                                        CT cervical spine without contrast. 



                                        



                                         



                                        



                                        COMPARISON: 10/17/2018.



                                        



                                         



                                        



                                        FINDINGS:



                                        



                                         



                                        



                                        CT HEAD:



                                        



                                         



                                        



                                        The ventricles and sulci are within norm

al limits for patient's age. A



                                        



                                        small contusion is seen in the midline f

rontal scalp (2:8). There is no



                                        



                                        midline shift. The basal cisterns are pr

eserved. No large vascular



                                        



                                        territory infarction, intracranial hemor

rhage or mass effect is seen. 



                                        



                                         



                                        



                                        CT cervical spine:



                                        



                                         



                                        



                          There is mild anterolisthesis of C3 on C4. The vertebr

al body heights 



                                        are



                                        



                                        preserved. No acute fractures are seen. 

Fusion is seen in the C2-C3 and



                                        



                          C6-C7 vertebral bodies. Moderate multilevel degenerati

ve changes are 



                                        noted



                                        



                                        the form of disc space narrowing, margin

al osteophytes and facet



                                        



                                        arthropathy, with the facet arthropathy 

being the dominant component of



                                        



                                        these changes.



                                        



                                                    









                                        Procedure Note

 

                                        [facility], Radiant Results [facility] User - 2020 11:41 AM CDT



HISTORY: Head trauma, minor, GCS>=13, high clinical risk, initial exam



                                        Altered mental status (AMS), unclear cau

se



                                        



                                        TECHNIQUE:



                                        CT head without contrast.



                                        CT cervical spine without contrast.



                                        



                                        COMPARISON: 10/17/2018.



                                        



                                        FINDINGS:



                                        



                                        CT HEAD:



                                        



                                        The ventricles and sulci are within norm

al limits for patient's age. A



                                        small contusion is seen in the midline f

rontal scalp (2:8). There is no



                                        midline shift. The basal cisterns are pr

eserved. No large vascular



                                        territory infarction, intracranial hemor

rhage or mass effect is seen.



                                        



                                        CT cervical spine:



                                        



                                        There is mild anterolisthesis of C3 on C

4. The vertebral body heights are



                                        preserved. No acute fractures are seen. 

Fusion is seen in the C2-C3 and



                                        C6-C7 vertebral bodies. Moderate multile

emmanuel degenerative changes are noted



                                        the form of disc space narrowing, margin

al osteophytes and facet



                                        arthropathy, with the facet arthropathy 

being the dominant component of



                                        these changes.



                                        



                                        IMPRESSION



                                        



                                        No acute intracranial findings.



                                        



                                        No acute osseous findings in the cervica

l spine.



                                        



                                        



                                        









                Performing Organization Address         City/State/Zipcode Phone

 Number

 

                PACS/VR/DOSE                                    



CT HEAD WO CONTRAST (2020 11:02 AM CDT)





                                        Specimen

 

                                        









                          Impressions               Performed At

 

                                         



                                        PACS/VR/DOSE



                                        No acute intracranial findings.



                                        



                                         



                                        



                                        No acute osseous findings in the cervica

l spine.



                                        



                                         



                                        



                                         



                                        



                                                    









                          Narrative                 Performed At

 

                                        HISTORY: Head trauma, minor, GCS>=13, hi

gh clinical risk, initial exam



                                        PACS/VR/DOSE



                                        Altered mental status (AMS), unclear cau

se 



                                        



                                         



                                        



                                        TECHNIQUE:



                                        



                                        CT head without contrast.



                                        



                                        CT cervical spine without contrast. 



                                        



                                         



                                        



                                        COMPARISON: 10/17/2018.



                                        



                                         



                                        



                                        FINDINGS:



                                        



                                         



                                        



                                        CT HEAD:



                                        



                                         



                                        



                                        The ventricles and sulci are within norm

al limits for patient's age. A



                                        



                                        small contusion is seen in the midline f

rontal scalp (2:8). There is no



                                        



                                        midline shift. The basal cisterns are pr

eserved. No large vascular



                                        



                                        territory infarction, intracranial hemor

rhage or mass effect is seen. 



                                        



                                         



                                        



                                        CT cervical spine:



                                        



                                         



                                        



                          There is mild anterolisthesis of C3 on C4. The vertebr

al body heights 



                                        are



                                        



                                        preserved. No acute fractures are seen. 

Fusion is seen in the C2-C3 and



                                        



                          C6-C7 vertebral bodies. Moderate multilevel degenerati

ve changes are 



                                        noted



                                        



                                        the form of disc space narrowing, margin

al osteophytes and facet



                                        



                                        arthropathy, with the facet arthropathy 

being the dominant component of



                                        



                                        these changes.



                                        



                                                    









                                        Procedure Note

 

                                        [facility], Radiant Results [facility] User - 2020 11:41 AM CDT



HISTORY: Head trauma, minor, GCS>=13, high clinical risk, initial exam



                                        Altered mental status (AMS), unclear cau

se



                                        



                                        TECHNIQUE:



                                        CT head without contrast.



                                        CT cervical spine without contrast.



                                        



                                        COMPARISON: 10/17/2018.



                                        



                                        FINDINGS:



                                        



                                        CT HEAD:



                                        



                                        The ventricles and sulci are within norm

al limits for patient's age. A



                                        small contusion is seen in the midline f

rontal scalp (2:8). There is no



                                        midline shift. The basal cisterns are pr

eserved. No large vascular



                                        territory infarction, intracranial hemor

rhage or mass effect is seen.



                                        



                                        CT cervical spine:



                                        



                                        There is mild anterolisthesis of C3 on C

4. The vertebral body heights are



                                        preserved. No acute fractures are seen. 

Fusion is seen in the C2-C3 and



                                        C6-C7 vertebral bodies. Moderate multile

emmanuel degenerative changes are noted



                                        the form of disc space narrowing, margin

al osteophytes and facet



                                        arthropathy, with the facet arthropathy 

being the dominant component of



                                        these changes.



                                        



                                        IMPRESSION



                                        



                                        No acute intracranial findings.



                                        



                                        No acute osseous findings in the cervica

l spine.



                                        



                                        



                                        









                Performing Organization Address         City/State/Zipcode Phone

 Number

 

                PACS/VR/DOSE                                    



XR CHEST 1 VW (2020 10:39 AM CDT)





                                        Specimen

 

                                        









                          Impressions               Performed At

 

                                         



                                        PACS/VR/DOSE



                                        Left lower lobe opacities likely represe

nting atelectasis/scarring.



                                        



                                        Infection cannot be excluded.



                                        



                                         



                                        



                                        Moderate-sized hiatal hernia, unchanged.



                                        



                                         



                                        



                                        Preliminary Report Dictated by Resident:

 Eduardo aLird MD., have reviewed this

 study and agree with the above



                                        



                          report.                   









                          Narrative                 Performed At

 

                                        XR CHEST 1 VW



                                        PACS/VR/DOSE



                                         



                                        



                                        HISTORY: AMS, fall 



                                        



                                         



                                        



                                        COMPARISON: 2020



                                        



                                         



                                        



                                        TECHNIQUE: AP radiograph of the chest wa

s performed.



                                        



                                         



                                        



                                        FINDINGS:



                                        



                                         



                                        



                          Emphysematous lungs. Rounded opacity projecting over t

he mediastinum 



                                        with



                                        



                                        air-fluid levels likely representing a m

oderate size hiatal hernia,



                                        



                                        unchanged. Left lower lobe hazy opacitie

s may represent



                                        



                          scarring/atelectasis and/or consolidations. No right p

leural effusion. 



                                        No



                                        



                                        pneumothorax. The lower neck is not incl

uded on the study and not



                                        



                                        evaluated.



                                        



                                         



                                        



                                        The heart size is normal. Calcifications

 of the aortic knob.



                                        



                                         



                                        



                                        No acute osseous abnormality. Prior chol

ecystectomy. Diffuse osteopenia.



                                        



                                        Right shoulder arthroplasty changes are 

partially visualized.



                                        



                                                    









                                        Procedure Note

 

                                        [facility], [facility]iant Results [facility] User - 2020 11:20 AM CDT



XR CHEST 1 VW



                                        



                                        HISTORY: AMS, fall



                                        



                                        COMPARISON: 2020



                                        



                                        TECHNIQUE: AP radiograph of the chest wa

s performed.



                                        



                                        FINDINGS:



                                        



                                        Emphysematous lungs. Rounded opacity pro

jecting over the mediastinum with



                                        air-fluid levels likely representing a m

oderate size hiatal hernia,



                                        unchanged. Left lower lobe hazy opacitie

s may represent



                                        scarring/atelectasis and/or consolidatio

ns. No right pleural effusion. No



                                        pneumothorax. The lower neck is not incl

uded on the study and not



                                        evaluated.



                                        



                                        The heart size is normal. Calcifications

 of the aortic knob.



                                        



                                        No acute osseous abnormality. Prior chol

ecystectomy. Diffuse osteopenia.



                                        Right shoulder arthroplasty changes are 

partially visualized.



                                        



                                        IMPRESSION



                                        



                                        Left lower lobe opacities likely represe

nting atelectasis/scarring.



                                        Infection cannot be excluded.



                                        



                                        Moderate-sized hiatal hernia, unchanged.



                                        



                                        Preliminary Report Dictated by Resident:

 Eduardo Laird MD., have reviewed this

 study and agree with the above



                                        report.









                Performing Organization Address         City/State/Zipcode Phone

 Number

 

                PACS/VR/DOSE                                    



XR FOOT 3+ VW LEFT (2020 10:39 AM CDT)





                                        Specimen

 

                                        









                          Impressions               Performed At

 

                                         



                                        PACS/VR/DOSE



                                        Displaced angulated fracture of the seco

nd toe distal P1.



                                        



                                         



                                        



                                        Diffuse osteopenia.



                                        



                                         



                                        



                                        Naviculocuneiform osteoarthrosis with ov

erlying soft tissue swelling



                                        



                                         



                                        



                                        Preliminary Report Dictated by Resident:

 Willi Laird MD., have reviewed thi

s study and agree with the above



                                        



                          report.                   









                          Narrative                 Performed At

 

                                        EXAM:



                                        PACS/VR/DOSE



                                         



                                        



                                        XR FOOT 3+ 



                                        



                                        VW LEFT



                                        



                                         



                                        



                                        HISTORY:



                                        



                                         



                                        



                                        left 2nd toe pain and abnormality. 



                                        



                                         



                                        



                                        COMPARISON:



                                        



                                         



                                        



                                        None



                                        



                                         



                                        



                                        FINDINGS: 



                                        



                                         



                                        



                                        Radiographs of the left foot demonstrate

 displaced angulated fracture of



                                        



                                        the second toe P1 head/neck with apex la

teral angulation of the distal



                                        



                                        fracture fragment. The joints maintain a

lignment. Diffuse osteopenia is



                                        



                          seen. Calcaneal enthesophyte formation is seen. Osteoa

rthritic changes 



                                        of



                                        



                                        the talonavicular joint are noted. A Hag

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 



                                        



                          Degenerative cyst formation is seen at the superior me

dial navicular 



                                        bone.



                                        



                                        Mild soft tissue swelling around the dis

maryann second toe is seen.



                                        



                                                    









                                        Procedure Note

 

                                        [facility], Radiant Results [facility] User - 2020 12:44 PM CDT



EXAM:



                                        



                                        XR FOOT 3+



                                        VW LEFT



                                        



                                        HISTORY:



                                        



                                        left 2nd toe pain and abnormality.



                                        



                                        COMPARISON:



                                        



                                        None



                                        



                                        FINDINGS:



                                        



                                        Radiographs of the left foot demonstrate

 displaced angulated fracture of



                                        the second toe P1 head/neck with apex la

teral angulation of the distal



                                        fracture fragment. The joints maintain a

lignment. Diffuse osteopenia is



                                        seen. Calcaneal enthesophyte formation i

s seen. Osteoarthritic changes of



                                        the talonavicular joint are noted. A Hag

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



                                        Degenerative cyst formation is seen at t

he superior medial navicular bone.



                                        Mild soft tissue swelling around the dis

maryann second toe is seen.



                                        



                                        IMPRESSION



                                        



                                        Displaced angulated fracture of the seco

nd toe distal P1.



                                        



                                        Diffuse osteopenia.



                                        



                                        Naviculocuneiform osteoarthrosis with ov

erlying soft tissue swelling



                                        



                                        Preliminary Report Dictated by Resident:

 Willi Laird MD., have reviewed this

 study and agree with the above



                                        report.









                Performing Organization Address         City/State/Zipcode Phone

 Number

 

                PACS/VR/DOSE                                    



CBC WITH DIFFERENTIAL (2020 10:19 AM CDT)





             Component    Value        Ref Range    Performed At Pathologist Sig

nature

 

             WBC          8.25         4.30 - 11.10 [facility] 



                                       10*3/L     [facility] 

 

             RBC          3.92 (L)     3.93 - 5.25  [facility] 



                                       10*6/L     [facility]PITAL [facility] 

 

             HGB          11.2 (L)     11.6 - 15.0  [facility] 



                                       g/dL         HOSPITAL [facility] 

 

             HCT          34.1 (L)     35.7 - 45.2 % [facility] 

 

             MCV          87.0         80.6 - 95.5 fL [facility] 

 

             MCH          28.6         25.9 - 32.8 pg [facility] 

 

             MCHC         32.8         31.6 - 35.1  [facility] 



                                       g/dL         [facility] 

 

             RDW-SD       48.1         39.0 - 49.9 fL [facility] 

 

             RDW-CV       15.1         12.0 - 15.5 % [facility] 

 

             PLT          226          166 - 358    [facility] 



                                       10*3/L     [facility] 

 

             MPV          9.3 (L)      9.5 - 12.9 fL [facility] 

 

             NRBC/100 WBC 0.0          0.0 - 10.0 /100 [facility] 



                                       WBCs         [facility] 

 

             NRBC x10^3   <0.01        10*3/L     [facility] 

 

             GRAN MAT (NEUT) % 86.9         %            [facility] 

 

             IMM GRAN %   0.50         %            [facility] 

 

             LYMPH %      7.5          %            [facility] 

 

             MONO %       4.7          %            [facility] 

 

             EOS %        0.2          %            [facility] 

 

             BASO %       0.2          %            [facility] 

 

             GRAN MAT x10^3(ANC) 7.16 (H)     1.88 - 7.09  [facility] 



                                       10*3/uL      HOSPITAL [facility] 

 

             IMM GRAN x10^3 0.04         0.00 - 0.06  [facility] 



                                       10*3/uL      [facility]OSPITAL [facility] 

 

             LYMPH x10^3  0.62 (L)     1.32 - 3.29  [facility] 



                                       10*3/uL      HOSPITAL [facility] 

 

             MONO x10^3   0.39         0.33 - 0.92  [facility] 



                                       10*3/uL      HOSPITAL [facility] 

 

             EOS x10^3    <0.03 (L)    0.03 - 0.39  [facility] 



                                       10*3/uL      [facility]OSPITAL [facility] 

 

             BASO x10^3   <0.03        0.01 - 0.07  [facility] 



                                       10*3/uL      HOSPITAL [facility] 









                                        Specimen

 

                                        Blood - ARM, LEFT









                Performing Organization Address         City/[facility]/Zipcode Phone

 Number

 

                [facility] CLIA: [id number], [address] [place], [place] [address]

[address] [phone]



                LABORATORY      [address]                  



aPTT (2020 10:19 AM CDT)





             Component    Value        Ref Range    Performed At Pathologist Sig

nature

 

             APTT Patient 25           23 - 38 Seconds [facility]

 



                                                    LABORATORY   









                                        Specimen

 

                                        Blood - ARM, LEFT









                          Narrative                 Performed At

 

                          The [facility] patient population mean normal value [facility] 

LABORATORY



                          for aPTT is 30 seconds.   









                Performing Organization Address         City/[facility]/[place]USTcode Phone

 Number

 

                [facility] CLIA: [id number], [address] [place], [place] [address]

15 [phone]



                LABORATORY      [address]                  



PROTHROMBIN TIME / INR (2020 10:19 AM CDT)





             Component    Value        Ref Range    Performed At Pathologist



                                                                 Signature

 

             PROTIME PATIENT 14.4         12.0 - 14.7  [facility]     



                                                    LABORATORY   

 

             INR          1.2Comment: Normal              [facility] 



                          INR <1.1; Warfarin              [facility]     



                          Therapeutic range              LABORATORY   



                          2.0 to 3.0 or 2.5                           



                          to 3.5, depending                           



                          upon the                               



                          indications.                           









                                        Specimen

 

                                        Blood - ARM, LEFT









                Performing Organization Address         City/[facility]/RUSTcode Phone

 Number

 

                [facility] CLIA: [id number], [address] [place] [address]

15 [phone]



                LABORATORY      [address]                  



TROPONIN I (2020 10:19 AM CDT)





             Component    Value        Ref Range    Performed At Pathologist Sig

nature

 

             TROPONIN I   0.006        <=0.034 ng/mL [facility] 



                                                    LABORATORY   









                                        Specimen

 

                                        Blood - ARM, LEFT









                          Narrative                 Performed At

 

                                        Equal or Less than 0.034 ng/ml---Normal 





                                        [facility]



                          Note: Cardiac troponin begins to rise 3-4 hours 



                          after the onset of ischemia. Repeat in 4-6 hours 



                          if the sample was drawn within 3-4 hours of the 



                                        onset of the symptom and found normal. 



                                        



                                         



                                        



                          Between 0.035 and 0.120 ng/mL--- Borderline. 



                                        Questionable myocardial injury or necros

is  



                                        



                          Note: Serial measurement may be necessary to 



                          confirm or exclude the diagnosis of myocardial 



                          injury or necrosis; Clinical correlation 



                          (symptoms, EKGs, imaging studies, and others) 



                          required; Repeat in 4-6 hours if clinically 



                                        indicated.    



                                        



                                         



                                        



                          Equal or Higher than 0.121 ng/mL---Abnormal. 



                          Myocardial Injury or Necrosis Likely     



                                        



                                        



                                         



                                        



                          Biotin has been reported to cause a negative 



                          bias, interpret results relative to patient's 



                          use of biotin.            



                                                   



                                        



                                           



                                                   



                                        



                                                    









                Performing Organization Address         City/State/Zipcode Phone

 Number

 

                [facility] CLIA: [id number], [address]

15 [phone]



                LABORATORY      [address]                  



COMP. METABOLIC PANEL (39138) (2020 10:19 AM CDT)





             Component    Value        Ref Range    Performed At Pathologist Sig

nature

 

             NA           136          135 - 145    [facility] 



                                       mmol/L       [facility] 

 

             K            3.1 (L)      3.5 - 5.0    [facility] 



                                       mmol/L       [facility] 

 

             CL           102          98 - 108 mmol/L [facility] 

 

             CO2 TOTAL    25           23 - 31 mmol/L [facility] 

 

             AGAP         9            2 - 16       [facility] 

 

             BUN          11           7 - 23 mg/dL [facility] 

 

             GLUCOSE      99           70 - 110 mg/dL [facility] 

 

             CREATININE   0.70         0.50 - 1.04  [facility] 



                                       mg/dL        [facility] 

 

             TOTAL BILI   1.0          0.1 - 1.1 mg/dL [facility] 

 

             CALCIUM      8.8          8.6 - 10.6   [facility] 



                                       mg/dL        [facility] LAB[facility] 

 

             T PROTEIN    7.3          6.3 - 8.2 g/dL [facility] 

 

             ALBUMIN      3.7          3.5 - 5.0 g/dL [facility] 

 

             ALK PHOS     121          34 - 122 U/L [facility] 

 

             ALTv         11           5 - 35 U/L   [facility] LAB[facility]AT[facility] 

 

             AST(SGOT)    27           13 - 40 U/L  [facility] LAB[facility] 

 

             eGFR Calculation 81.4         mL/min/1.73m2 [facility] 



             (Non-[facility] LABORATORY 



             American)                                           

 

             eGFR Calculation 98.6         mL/min/1.73m2 [facility] 



             (African American)                           [facility] LABORATORY 









                                        Specimen

 

                                        Blood - ARM, LEFT









                          Narrative                 Performed At

 

                          [facility] of Glomerular Filtration Rate (GFR) [facility] 

LABORATORY



                                        and Staging of Kidney Disease*



                                        



                                         



                                        



                          +-----------------------+---------------------+- 



                                        ------------------------+



                                        



                          | GFR (mL/min/1.73 m2) | With Kidney Damage 



                                        | Without Kidney Damage



                                        



                          +-----------------------+---------------------+- 



                                        ------------------------+



                                        



                          | >90         | Stage one 



                              |  Normal        



                                        



                                        



                          +-----------------------+---------------------+- 



                                        ------------------------+



                                        



                          | 60-89        | Stage two 



                                            |  Decreased GFR   

  



                                        



                          +-----------------------+---------------------+- 



                                        ------------------------+



                                        



                          | 30-59        | Stage three 



                                           |  Stage three     

 



                                        



                          +-----------------------+---------------------+- 



                                        ------------------------+



                                        



                          | 15-29        | Stage four 



                                            |  Stage four     

 



                                        



                          +-----------------------+---------------------+- 



                                        ------------------------+



                                        



                          | <15 (or dialysis)  | Stage five   



                                          |  Stage five      



                                        



                          +-----------------------+---------------------+- 



                                        ------------------------+



                                        



                                         



                                        



                          *Each stage assumes the associated GFR level has 



                          been in effect for at least three months. 



                          Stages 1 to 5, with or without kidney disease, 



                                        indicate chronic kidney disease.



                                        



                                         



                                        



                          Notes: Determination of stages one and two (with 



                          eGFR >59mL/min/1.73 m2) requires estimation of 



                          kidney damage for at least three months as 



                          defined by structural or functional 



                          abnormalities of the kidney, manifested by 



                                        either:



                                        



                          Pathological abnormalities or Markers of kidney 



                          damage (including abnormalities in the 



                          composition of the blood or urine or 



                                        abnormalities in imaging tests). 



                                        



                                                    









                Performing Organization Address         City/State/Zipcode Phone

 Number

 

                [facility] CLIA: [id number], [address] [place],  [address] [phone]



                LABORATORY      [address]                  



documented in this encounter



Visit Diagnoses







                                        Diagnosis

 

                                        Injury of head, initial encounter - Prim

shukri

 

                                        Altered mental status, unspecified alter

ed mental status type

 

                                        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

rbance

 

                                        Left foot pain







                                        Pain in limb

 

                                        Fall, initial encounter

 

                                        Toe dislocation, left, initial encounter

 

                                        Displaced fracture of proximal phalanx o

f right great toe, initial encounter for



                                        closed fracture

 

                                        Contusion of scalp, initial encounter



documented in this encounter



Insurance







          Payer     Benefit Plan / Subscriber ID Effective Dates Phone     Addre

ss   Type



                    Group                                             

 

          MEDICARE  MEDICARE PART xxxxxxxxxxx [id number] [phone] [address] 

Med[address]are



                      [address]                 t          [address]          [id number]



                                        



                                                            [place] 



                                                            [id number] 









           Guarantor Name Account Type Relation to Date of    Phone      Billing



                                 Patient    Birth                 Address

 

           Radha Garza Personal/Family Self       1945 [phone]

[address]                                         (Home)     [place], [place]



                                                                  [id number]



documented as of this encounter

## 2020-05-14 NOTE — XMS REPORT
Summary of Care

                            Created on:2020



Patient:Radha Garza

Sex:Female

:1945

External Reference #:[id number]





Demographics







                          Address                   [address]



                                                    [place] [address]

 

                          Home Phone                [phone]

 

                          Mobile Phone              [phone]

 

                          Phone                     [phone]

 

                          Email Address             [email or web address]

 

                          Preferred Language        English

 

                          Marital Status            

 

                          Anabaptism Affiliation     Unknown

 

                          Race                      White

 

                          Ethnic Group              Not  or 









Author







                          Organization              [facility]

 

                          Address                   [address] [place] [id number]









Support







                Name            Relationship    Address         Phone

 

                Yin Boss     Unavailable     [address]   [phone]



                                                [place] [address] 

 

                Ryan Garza Unavailable     [address] [phone]



                                                [place] [address] 









Care Team Providers







                    Name                Role                Phone

 

                    MD Davis         Primary Care Provider [phone]









Reason for Visit







                          Reason                    Comments

 

                          Assessment                







Encounter Details







             Date         Type         Department   Care Team    Description

 

                04/10/2020      Telephone       [facility] Family Medicine Johann Mayers MD



                                        Assessment



                                                            - [address] [address]



                                        



                                                            [place] [address]

[phone] (Fax) 







Allergies







             Active Allergy Reactions    Severity     Noted Date   Comments

 

             Chocolate Flavor Anxiety                   2015   

 

             Nitrofurantoin Unknown - See              2015   



             Monohyd/M-Cryst comments                               

 

             Morphine     Nausea and/or              2017   



                          Vomiting                               

 

             Penicillin   Other - See comments              2017   Pt stat

es "several



                                                                 family members 

are



                                                                 allergic so i t

hink



                                                                 i am"



documented as of this encounter (statuses as of 2020)



Medications







          Medication Sig       Dispensed Refills   Start Date End Date  Status

 

          lovastatin 20 mg tablet Take 20 mg by           0         2017  

         Active



                    mouth daily.                                         

 

          traMADOL (ULTRAM) 50 mg Take 1 tablet by 20 tablet 0         

8           Active



          tablet    mouth every 6                                         



                    (six) hours as                                         



                    needed for Pain                                         



                    (scale 4-6).                                         

 

          mupirocin 2 % Apply  to 22 g      0         2018           Activ

e



          ointmentIndications: area(s) 3                                        

 



          Abscess   (three) times                                         



                    daily.                                            

 

          Ferrous Fumarate-Folic Take 1 tablet by 90 tablet 1         2019

           Active



          Acid (HEMATINIC/FOLIC mouth daily.                                    

     



          ACID) 324 mg (106 mg                                                  

 



          iron)-1 mg                                                   



          TabIndications: Iron                                                  

 



          deficiency anemia,                                                   



          unspecified iron                                                   



          deficiency anemia type                                                

   

 

          vitamin B-12 1,000 mcg Take 1 tablet by 30 tablet 0         2020

           Active



          tabletIndications: mouth daily.                                       

  



          Dizziness, PAF                                                   



          (paroxysmal atrial                                                   



          fibrillation)                                                   

 

          metoprolol succinate XL Take 1 tablet by 30 tablet 0         

0           Active



          50 mg 24 hr mouth daily.                                         



          tabletIndications:                                                   



          Dizziness, PAF                                                   



          (paroxysmal atrial                                                   



          fibrillation)                                                   

 

          doxycycline monohydrate Take 100 mg by           0                    

         Active



          100 mg capsule mouth 2 (two)                                         



                    times daily.                                         

 

          ciprofloxacin HCl 500 mg Take 500 mg by           0                   

          Active



          tablet    mouth 2 (two)                                         



                    times daily.                                         

 

          hydrocortisone Insert 1 Dose           0                             A

ctive



          (PROCTOSOL HC RECTAL) into rectum 2                                   

      



                    (two) times                                         



                    daily.                                            

 

          rivastigmine 4.6 mg/24 Apply 1 Patch to           0         2020

           Active



          hr patch  skin daily.                                         

 

          ciclopirox (PENLAC) 8 % Apply 1 Dose to           0                   

          Active



          solution  area(s) at                                         



                    bedtime. Apply                                         



                    to Nails.                                         

 

          Cholecalciferol, Vitamin Take 1 capsule           0                   

          Active



          D3, (VITAMIN D3) 25 mcg by mouth daily.                               

          



          (1,000 unit) capsule                                                  

 



documented as of this encounter (statuses as of 2020)



Active Problems







                          Problem                   Noted Date

 

                          Opacity of lung on imaging study 04/10/2020

 

                          Syncope                   2020

 

                          LOC (loss of consciousness) 2020

 

                          PAF (paroxysmal atrial fibrillation) 2020

 

                          Orthostatic hypotension   2020

 

                          Dizziness                 2020

 

                          S/p reverse total shoulder arthroplasty 2017

 

                          Right arm pain            2017

 

                          Essential hypertension    03/10/2016

 

                          Hyperlipemia              03/10/2016

 

                          Hypothyroidism            03/10/2016

 

                          Iron (Fe) deficiency anemia 03/10/2016

 

                          Vitamin D deficiency      03/10/2016



documented as of this encounter (statuses as of 2020)



Immunizations







                    Name                Administration Dates Next Due

 

                    Influenza High Dose 2019, 10/12/2017 

 

                    Pneumococcal Polysaccharide, PPSV23 (PNEUMOVAX) 2019  

        



documented as of this encounter



Social History







             Tobacco Use  Types        Packs/Day    Years Used   Date

 

             Never Smoker Cigarettes                1            Quit: 03/10/198

0









                Smokeless Tobacco: Never Used                                 









                Alcohol Use     Drinks/Week     oz/Week         Comments

 

                Yes                                             Occasional Drink

er









                          Sex Assigned at Birth     Date Recorded

 

                          Not on file               









                    Job Start Date      Occupation          Industry

 

                    Not on file         Not on file         Not on file









                    Travel History      Travel Start        Travel End









                                        No recent travel history available.



documented as of this encounter



Last Filed Vital Signs

Not on filedocumented in this encounter



Plan of Treatment







             Date         Type         Specialty    Care Team    Description

 

                2020      Telemedicine Visit Family Medicine Marie Cannon MD



                                        



                                                                [address] [place] [address]

 [phone] (Fax) 









                Health Maintenance Due Date        Last Done       Comments

 

                DTaP,Tdap,and Td Vaccines (1 - Tdap) 1954                 

     

 

                Zoster Recombinant Vaccine (SHINGRIX) (1 1993             

         



                of 2)                                           

 

                Medicare Wellness Visit 2008                      

 

                Osteoporosis Screening 2008                      

 

                PNEUMOCOCCAL VACCINES 65+ (2 of 2 - PCV13) 2020      

 

                INFLUENZA VACCINE Completed       2019, 10/12/2017 



documented as of this encounter



Implants







          Implanted Type      Area       Device    Shelf     Model /



                                                  Identifier Expiration Serial /

 Lot



                                                            Date      

 

          Cement    CEMENT    Right:    Biomet              2018 [id number]

 /



                                        Implanted: Qty: 1 on 2017 by Greg Garcia MD at [facility]            Shoulder                                    6

[id number] /



                                                                      [id number]

 

          Cement    CEMENT    Right:    Biomet              2018 6195-1-00

1 /



                                        Implanted: Qty: 1 on 2017 by Greg Garcia MD at [facility]            Shoulder                                    6

[id number] /



                                                                      [id number]

 

          Central Screw SCREW     Right:    Biomet              08/10/2027 42027

5 /



                                        Implanted: Qty: 1 on 2017 by Greg Garcia MD at [facility]            Shoulder                                    7

[id number] /



                                                                      [id number]

 

          Locking Screw SCREW     Right:    Biomet              2027 66229

0 /



                                        Implanted: Qty: 1 on 2017 by Greg Garcia MD at [facility]            Shoulder                                    4

[id number] /



                                                                      [id number]

 

          Locking Screw SCREW     Right:    Biomet              2027 06399

0 /



                                        Implanted: Qty: 1 on 2017 by Greg Garcia MD at [facility]            Shoulder                                    2

[id number] /



                                                                      [id number]

 

          Nonlocking Screw SCREW     Right:    Biomet              2027 18

0557 /



                                        Implanted: Qty: 1 on 2017 by Greg Garcia MD at [facility]            Shoulder                                    8

[id number] /



                                                                      [id number]

 

          Nonlocking Screw SCREW     Right:    Biomet              2027 18

0557 /



                                        Implanted: Qty: 1 on 2017 by Greg Garcia MD at [facility]            Shoulder                                    4

[id number] /



                                                                      [id number]

 

          Bone Cement Restrictor SHOULDER  Right:    Biomet              

021 [id number] /



                                        Implanted: Qty: 1 on 2017 by Greg Garcia MD at [facility]            Shoulder                                    3

[id number] /



                                                                      [id number]

 

          Humeral Bearing SHOULDER  Right:    Biomet              2022 XL-

[id number] /



                                        Implanted: Qty: 1 on 2017 by Greg Garcia MD at [facility]            Shoulder                                    2

[id number] /



                                                                      [id number]

 

          Humeral Tray With Locking Ring SHOULDER  Right:    Biomet             

 2027 [id number] /



                                        Implanted: Qty: 1 on 2017 by Greg Garcia MD at [facility]            Shoulder                                    2

[id number] /



                                                                      [id number]

 

          Mini Baseplate SHOULDER  Right:    Biomet              2027 0100

[id number] /



                                        Implanted: Qty: 1 on 2017 by Greg Garcia MD at [facility]            Shoulder                                    1

[id number] /



                                                                      [id number]

 

          Glenosphere SHOULDER  Right:    Biomet              2027 548327 

/



                                        Implanted: Qty: 1 on 2017 by Greg Garcia MD at [facility]            Shoulder                                    7

[id number] /



                                                                      [id number]

 

          Humeral Fracture Stem Stem      Right:    Biomet              20

27 [id number] /



                                        Implanted: Qty: 1 on 2017 by Greg Garcia MD at [facility]            Shoulder                                    4

[id number] /



                                                                      [id number]



documented as of this encounter



Results

Not on filedocumented in this encounter



Insurance







          Payer     Benefit Plan / Subscriber ID Effective Dates Phone     Addre

ss   Type



                    Group                                             

 

          MEDICARE  MEDICARE PART xxxxxxxxxxx 2008-Teresa [phone] [address] 

Med[address]are



                      A & B                 t          2          [id number]



                                        



                                                            [place] 



                                                            [id number] 



documented as of this encounter

## 2020-05-14 NOTE — XMS REPORT
Summary of Care

                            Created on:2020



Patient:Radha Garza

Sex:Female

:1945

External Reference #:[id number]





Demographics







                          Address                   [address]



                                                    [place] [address]

 

                          Home Phone                [phone]

 

                          Mobile Phone              [phone]

 

                          Phone                     [phone]

 

                          Email Address             [email or web address]

 

                          Preferred Language        English

 

                          Marital Status            

 

                          Mandaeism Affiliation     Unknown

 

                          Race                      White

 

                          Ethnic Group              Not  or 









Author







                          Organization              [facility]

 

                          Address                   [address] [place] [address]









Support







                Name            Relationship    Address         Phone

 

                Yin Boss     Unavailable     [address]   [phone]



                                                [place] [address] 

 

                Ryan Garza Unavailable     [address] [phone]



                                                [place] [address] 









Care Team Providers







                    Name                Role                Phone

 

                    MD Davis         Primary Care Provider [phone]









Encounter Details







             Date         Type         Department   Care Team    Description

 

                    2020          Orders Only         [facility]



                          Doctor Unassigned, No     



                                                            [address] [facility]



                                        Name



                                        



                                                [place] [address] [address] [place] [address] 







Allergies







             Active Allergy Reactions    Severity     Noted Date   Comments

 

             Chocolate Flavor Anxiety                   2015   

 

             Nitrofurantoin Unknown - See              2015   



             Monohyd/M-Cryst comments                               

 

             Morphine     Nausea and/or              2017   



                          Vomiting                               

 

             Penicillin   Other - See comments              2017   Pt stat

es "several



                                                                 family members 

are



                                                                 allergic so i t

hink



                                                                 i am"



documented as of this encounter (statuses as of 2020)



Medications







          Medication Sig       Dispensed Refills   Start Date End Date  Status

 

          Cholecalciferol, Vitamin Take 1,000 Units           0                 

            Active



          D3, (VITAMIN D3) 1,000 by mouth every                                 

        



          unit capsule morning.                                          

 

          lovastatin 20 mg tablet Take 20 mg by           0         2017  

         Active



                    mouth daily.                                         

 

          ciclopirox 8 % Apply  to 6.6 mL    2         10/04/2017           Acti

ve



          solutionIndications: area(s) at                                       

  



          Onychomycosis bedtime. Apply                                         



                    to dry                                            



                    nail/surrounding                                         



                    skin. Reapply                                         



                    nightly. Remove                                         



                    with alcohol,                                         



                    trim/file nails                                         



                    on day 7. repeat                                         

 

          traMADOL (ULTRAM) 50 mg Take 1 tablet by 20 tablet 0         

8           Active



          tablet    mouth every 6                                         



                    (six) hours as                                         



                    needed for Pain                                         



                    (scale 4-6).                                         

 

          mupirocin 2 % Apply  to 22 g      0         2018           Activ

e



          ointmentIndications: area(s) 3                                        

 



          Abscess   (three) times                                         



                    daily.                                            

 

          rivaroxaban 15 mg tablet Take 1 tablet by           0         20

18           Active



                    mouth daily.                                         

 

          hydrocortisone Insert  into 30 g      1         2018           A

ctive



          (PROCTOSOL HC) 2.5 % rectum 2 (two)                                   

      



          rectal cream times daily.                                         

 

          levothyroxine 88 mcg TAKE 1 TABLET BY 90 tablet 1         10/29/2019  

         Active



          tabletIndications: MOUTH ONCE DAILY                                   

      



          Hypothyroidism, IN THE MORNING                                        

 



          unspecified type                                                   

 

          Ferrous Fumarate-Folic Take 1 tablet by 90 tablet 1         2019

           Active



          Acid (HEMATINIC/FOLIC mouth daily.                                    

     



          ACID) 324 mg (106 mg                                                  

 



          iron)-1 mg                                                   



          TabIndications: Iron                                                  

 



          deficiency anemia,                                                   



          unspecified iron                                                   



          deficiency anemia type                                                

   

 

          rivastigmine 4.6 mg/24 Apply 1 Patch to 30 Patch  1         2020

           Active



          hr patchIndications: skin daily.                                      

   



          Late onset Alzheimer's                                                

   



          disease without                                                   



          behavioral disturbance                                                

   

 

          vitamin B-12 1,000 mcg Take 1 tablet by 30 tablet 0         2020

           Active



          tabletIndications: mouth daily.                                       

  



          Dizziness, PAF                                                   



          (paroxysmal atrial                                                   



          fibrillation)                                                   



documented as of this encounter (statuses as of 2020)



Active Problems







                          Problem                   Noted Date

 

                          LOC (loss of consciousness) 2020

 

                          PAF (paroxysmal atrial fibrillation) 2020

 

                          Orthostatic hypotension   2020

 

                          Dizziness                 2020

 

                          S/p reverse total shoulder arthroplasty 2017

 

                          Right arm pain            2017

 

                          Essential hypertension    03/10/2016

 

                          Hyperlipemia              03/10/2016

 

                          Hypothyroidism            03/10/2016

 

                          Iron (Fe) deficiency anemia 03/10/2016

 

                          Vitamin D deficiency      03/10/2016



documented as of this encounter (statuses as of 2020)



Immunizations







                    Name                Administration Dates Next Due

 

                    Influenza High Dose 2019, 10/12/2017 

 

                    Pneumococcal Polysaccharide, PPSV23 (PNEUMOVAX) 2019  

        



documented as of this encounter



Social History







             Tobacco Use  Types        Packs/Day    Years Used   Date

 

             Never Smoker Cigarettes                1            Quit: 03/10/198

0









                Smokeless Tobacco: Never Used                                 









                Alcohol Use     Drinks/Week     oz/Week         Comments

 

                Yes                                             Occasional Drink

er









                          Sex Assigned at Birth     Date Recorded

 

                          Not on file               









                    Job Start Date      Occupation          Industry

 

                    Not on file         Not on file         Not on file









                    Travel History      Travel Start        Travel End









                                        No recent travel history available.



documented as of this encounter



Last Filed Vital Signs

Not on filedocumented in this encounter



Plan of Treatment







                Health Maintenance Due Date        Last Done       Comments

 

                DTaP,Tdap,and Td Vaccines (1 - Tdap) 1954                 

     

 

                Zoster Recombinant Vaccine (SHINGRIX) (1 1993             

         



                of 2)                                           

 

                Medicare Wellness Visit 2008                      

 

                Osteoporosis Screening 2008                      

 

                PNEUMOCOCCAL VACCINES 65+ (2 of 2 - PCV13) 2020      

 

                INFLUENZA VACCINE Completed       2019, 10/12/2017 



documented as of this encounter



Implants







          Implanted Type      Area       Device    Shelf     Model /



                                                  Identifier Expiration Serial /

 Lot



                                                            Date      

 

          Cement    CEMENT    Right:    Biomet              2018 [id number]

 /



                                        Implanted: Qty: 1 on 2017 by Greg Garcia MD at [facility]            Shoulder                                    6

[id number] /



                                                                      [id number]

 

          Cement    CEMENT    Right:    Biomet              2018 [id number]

1 /



                                        Implanted: Qty: 1 on 2017 by Greg Garcia MD at [facility]            Shoulder                                    6

[id number] /



                                                                      [id number]

 

          Central Screw SCREW     Right:    Biomet              08/10/2027 65542

5 /



                                        Implanted: Qty: 1 on 2017 by Greg Garcia MD at [facility]            Shoulder                                    7

[id number] /



                                                                      [id number]

 

          Locking Screw SCREW     Right:    Biomet              2027 87131

0 /



                                        Implanted: Qty: 1 on 2017 by Greg Garcia MD at [facility]            Shoulder                                    4

[id number] /



                                                                      [id number]

 

          Locking Screw SCREW     Right:    Biomet              2027 56073

0 /



                                        Implanted: Qty: 1 on 2017 by Greg Garcia MD at [facility]            Shoulder                                    2

[id number] /



                                                                      [id number]

 

          Nonlocking Screw SCREW     Right:    Biomet              2027 18

0557 /



                                        Implanted: Qty: 1 on 2017 by Greg Garcia MD at [facility]            Shoulder                                    8

[id number] /



                                                                      [id number]

 

          Nonlocking Screw SCREW     Right:    Biomet              2027 18

0557 /



                                        Implanted: Qty: 1 on 2017 by Greg Garcia MD at [facility]            Shoulder                                    4

[id number] /



                                                                      [id number]

 

          Bone Cement Restrictor SHOULDER  Right:    Biomet              

021 [id number] /



                                        Implanted: Qty: 1 on 2017 by Greg Garcia MD at [facility]            Shoulder                                    3

[id number] /



                                                                      [id number]

 

          Humeral Bearing SHOULDER  Right:    Biomet              2022 XL-

[id number] /



                                        Implanted: Qty: 1 on 2017 by Greg Garcia MD at [facility]            Shoulder                                    2

[id number] /



                                                                      [id number]

 

          Humeral Tray With Locking Ring SHOULDER  Right:    Biomet             

 2027 [id number] /



                                        Implanted: Qty: 1 on 2017 by Greg Garcia MD at [facility]            Shoulder                                    2

[id number] /



                                                                      [id number]

 

          Mini Baseplate SHOULDER  Right:    Biomet              2027 0100

[id number] /



                                        Implanted: Qty: 1 on 2017 by Greg Garcia MD at [facility]            Shoulder                                    1

[id number] /



                                                                      [id number]

 

          Glenosphere SHOULDER  Right:    Biomet              2027 [id number] 

/



                                        Implanted: Qty: 1 on 2017 by Greg Garcia MD at [facility]            Shoulder                                    7

[id number] /



                                                                      [id number]

 

          Humeral Fracture Stem Stem      Right:    Biomet              20

27 [id number] /



                                        Implanted: Qty: 1 on 2017 by Greg Garcia MD at [facility]            Shoulder                                    4

[id number] /



                                                                      [id number]



documented as of this encounter



Procedures







             Procedure Name Priority     Date/Time    Associated Diagnosis Comme

nts

 

             HOME HEALTH - OTHER Routine      2020 12:01 AM CDT           

   



documented in this encounter



Results

Not on filedocumented in this encounter



Insurance







          Payer     Benefit Plan / Subscriber ID Effective Dates Phone     Addre

ss   Type



                    Group                                             

 

          MEDICARE  MEDICARE PART xxxxxxxxxxx [id number] [phone] [address] 

Medicare



                      [address] & [address]                 t          2          [id number]



                                        



                                                            [place] 



                                                            [id number] 



documented as of this encounter

## 2020-05-14 NOTE — EKG
Test Date:    2020-05-14               Test Time:    09:37:28

Technician:   AUGUST                                    

                                                     

MEASUREMENT RESULTS:                                       

Intervals:                                           

Rate:         73                                     

IN:           150                                    

QRSD:         84                                     

QT:           394                                    

QTc:          434                                    

Axis:                                                

P:            81                                     

IN:           150                                    

QRS:          32                                     

T:            93                                     

                                                     

INTERPRETIVE STATEMENTS:                                       

                                                     

Normal sinus rhythm

Nonspecific ST and T wave abnormality

Abnormal ECG

Compared to ECG 12/23/2019 11:42:35

ST (T wave) deviation now present

Atrial premature complex(es) no longer present



Electronically Signed On 05-14-20 11:13:22 CDT by Peter Petersen

## 2020-05-14 NOTE — XMS REPORT
Summary of Care

                            Created on:2020



Patient:Radha Garza

Sex:Female

:1945

External Reference #:[id number]





Demographics







                          Address                   [address]



                                                    [place] [address]

 

                          Home Phone                [phone]

 

                          Mobile Phone              [phone]

 

                          Phone                     [phone]

 

                          Email Address             [email or web address]

 

                          Preferred Language        English

 

                          Marital Status            

 

                          Temple Affiliation     Unknown

 

                          Race                      White

 

                          Ethnic Group              Not  or 









Author







                          Organization              [facility]

 

                          Address                   [address] [place] [address]









Support







                Name            Relationship    Address         Phone

 

                Yin Boss     Unavailable     [address]   [phone]



                                                [place] [address] 

 

                Ryan Garza Unavailable     [address] [phone]



                                                [place] [address] 









Care Team Providers







                    Name                Role                Phone

 

                    MD Davis         Primary Care Provider [phone]









Reason for Visit







                          Reason                    Comments

 

                          Refill Request            







Encounter Details







             Date         Type         Department   Care Team    Description

 

                2020      Refill          [facility] Johann Mayers MD



                                        Refill Request



                                                            - [address]



                                        



                                                            [address] [place] [address]

e



                                        [place] [address]



                                        



                                                            [place] [address]

[address]



                                        [phone] (Fax) 







Allergies







             Active Allergy Reactions    Severity     Noted Date   Comments

 

             Chocolate Flavor Anxiety                   2015   

 

             Nitrofurantoin Unknown - See              2015   



             Monohyd/M-Cryst comments                               

 

             Morphine     Nausea and/or              2017   



                          Vomiting                               

 

             Penicillin   Other - See comments              2017   Pt stat

es "several



                                                                 family members 

are



                                                                 allergic so i t

hink



                                                                 i am"



documented as of this encounter (statuses as of 2020)



Medications







          Medication Sig       Dispensed Refills   Start Date End Date  Status

 

          Cholecalciferol, Take 1,000           0                             Ac

tive



          Vitamin D3, Units by                                          



          (VITAMIN D3) 1,000 mouth every                                        

 



          unit capsule morning.                                          

 

          lovastatin 20 mg Take 20 mg           0         2017           A

ctive



          tablet    by mouth                                          



                    daily.                                            

 

          ciclopirox 8 % Apply  to 6.6 mL    2         10/04/2017           Acti

ve



          solutionIndications area(s) at                                        

 



          : Onychomycosis bedtime.                                          



                    Apply to dry                                         



                    nail/surroun                                         



                    ding skin.                                         



                    Reapply                                           



                    nightly.                                          



                    Remove with                                         



                    alcohol,                                          



                    trim/file                                         



                    nails on day                                         



                    7. repeat                                         

 

          traMADOL (ULTRAM) Take 1    20 tablet 0         2018           A

ctive



          50 mg tablet tablet by                                         



                    mouth every                                         



                    6 (six)                                           



                    hours as                                          



                    needed for                                         



                    Pain (scale                                         



                    4-6).                                             

 

          mupirocin 2 % Apply  to 22 g      0         2018           Activ

e



          ointmentIndications area(s) 3                                         



          : Abscess (three)                                           



                    times daily.                                         

 

          rivaroxaban 15 mg Take 1              0         2018           A

ctive



          tablet    tablet by                                         



                    mouth daily.                                         

 

          hydrocortisone Insert  into 30 g      1         2018           A

ctive



          (PROCTOSOL HC) 2.5 rectum 2                                          



          % rectal cream (two) times                                         



                    daily.                                            

 

          levothyroxine 88 TAKE 1    90 tablet 1         10/29/2019           Ac

tive



          mcg       TABLET BY                                         



          tabletIndications: MOUTH ONCE                                         



          Hypothyroidism, DAILY IN THE                                         



          unspecified type MORNING                                           

 

          Ferrous   Take 1    90 tablet 1         2019           Active



          Fumarate-Folic Acid tablet by                                         



          (HEMATINIC/FOLIC mouth daily.                                         



          ACID) 324 mg (106                                                   



          mg iron)-1 mg                                                   



          TabIndications:                                                   



          Iron deficiency                                                   



          anemia, unspecified                                                   



          iron deficiency                                                   



          anemia type                                                   

 

          rivastigmine 4.6 Apply 1   30 Patch  1         2020           Ac

tive



          mg/24 hr  Patch to                                          



          patchIndications: skin daily.                                         



          Late onset                                                   



          Alzheimer's disease                                                   



          without behavioral                                                   



          disturbance                                                   

 

          vitamin B-12 1,000 Take 1    30 tablet 0         2020           

Active



          mcg       tablet by                                         



          tabletIndications: mouth daily.                                       

  



          Dizziness, PAF                                                   



          (paroxysmal atrial                                                   



          fibrillation)                                                   

 

          mirtazapine 7.5 mg Take 1    30 tablet 1         2020           

Active



          tablet    tablet by                                         



                    mouth at                                          



                    bedtime.                                          

 

          metoprolol Take 1    30 tablet 0         2020           Active



          succinate XL 50 mg tablet by                                         



          24 hr     mouth daily.                                         



          tabletIndications:                                                   



          Dizziness, PAF                                                   



          (paroxysmal atrial                                                   



          fibrillation)                                                   

 

          metoprolol Take 1    30 tablet 0         2020 Disconti

nued



          succinate XL 50 mg tablet by                               0         (

Reorder)



          24 hr     mouth daily.                                         



          tabletIndications:                                                   



          Dizziness, PAF                                                   



          (paroxysmal atrial                                                   



          fibrillation)                                                   



documented as of this encounter (statuses as of 2020)



Active Problems







                          Problem                   Noted Date

 

                          LOC (loss of consciousness) 2020

 

                          PAF (paroxysmal atrial fibrillation) 2020

 

                          Orthostatic hypotension   2020

 

                          Dizziness                 2020

 

                          S/p reverse total shoulder arthroplasty 2017

 

                          Right arm pain            2017

 

                          Essential hypertension    03/10/2016

 

                          Hyperlipemia              03/10/2016

 

                          Hypothyroidism            03/10/2016

 

                          Iron (Fe) deficiency anemia 03/10/2016

 

                          Vitamin D deficiency      03/10/2016



documented as of this encounter (statuses as of 2020)



Immunizations







                    Name                Administration Dates Next Due

 

                    Influenza High Dose 2019, 10/12/2017 

 

                    Pneumococcal Polysaccharide, PPSV23 (PNEUMOVAX) 2019  

        



documented as of this encounter



Social History







             Tobacco Use  Types        Packs/Day    Years Used   Date

 

             Never Smoker Cigarettes                1            Quit: 03/10/198

0









                Smokeless Tobacco: Never Used                                 









                Alcohol Use     Drinks/Week     oz/Week         Comments

 

                Yes                                             Occasional Drink

er









                          Sex Assigned at Birth     Date Recorded

 

                          Not on file               









                    Job Start Date      Occupation          Industry

 

                    Not on file         Not on file         Not on file









                    Travel History      Travel Start        Travel End









                                        No recent travel history available.



documented as of this encounter



Last Filed Vital Signs

Not on filedocumented in this encounter



Plan of Treatment







                Health Maintenance Due Date        Last Done       Comments

 

                DTaP,Tdap,and Td Vaccines (1 - Tdap) 1954                 

     

 

                Zoster Recombinant Vaccine (SHINGRIX) (1 1993             

         



                of 2)                                           

 

                Medicare Wellness Visit 2008                      

 

                Osteoporosis Screening 2008                      

 

                PNEUMOCOCCAL VACCINES 65+ (2 of 2 - PCV13) 2020      

 

                INFLUENZA VACCINE Completed       2019, 10/12/2017 



documented as of this encounter



Implants







          Implanted Type      Area       Device    Shelf     Model /



                                                  Identifier Expiration Serial /

 Lot



                                                            Date      

 

          Cement    CEMENT    Right:    Biomet              2018 [id number]

 /



                                        Implanted: Qty: 1 on 2017 by Greg Garcia MD at [facility]            Shoulder                                    6

[id number] /



                                                                      [id number]

 

          Cement    CEMENT    Right:    Biomet              2018 6195-1-00

1 /



                                        Implanted: Qty: 1 on 2017 by Greg Garcia MD at [facility]            Shoulder                                    6

[id number] /



                                                                      [id number]

 

          Central Screw SCREW     Right:    Biomet              08/10/2027 [id number]

5 /



                                        Implanted: Qty: 1 on 2017 by Greg Garcai MD at [facility]            Shoulder                                    7

[id number] /



                                                                      [id number]

 

          Locking Screw SCREW     Right:    Biomet              2027 07181

0 /



                                        Implanted: Qty: 1 on 2017 by Greg Garcia MD at [facility]            Shoulder                                    4

[id number] /



                                                                      [id number]

 

          Locking Screw SCREW     Right:    Biomet              2027 89429

0 /



                                        Implanted: Qty: 1 on 2017 by Greg Garcia MD at [facility]            Shoulder                                    2

[id number] /



                                                                      [id number]

 

          Nonlocking Screw SCREW     Right:    Biomet              2027 18

0557 /



                                        Implanted: Qty: 1 on 2017 by Greg Garcia MD at [facility]            Shoulder                                    8

[id number] /



                                                                      [id number]

 

          Nonlocking Screw SCREW     Right:    Biomet              2027 18

0557 /



                                        Implanted: Qty: 1 on 2017 by Greg Garcia MD at [facility]            Shoulder                                    4

[id number] /



                                                                      [id number]

 

          Bone Cement Restrictor SHOULDER  Right:    Biomet              

021 [id number] /



                                        Implanted: Qty: 1 on 2017 by Greg Garcia MD at [facility]            Shoulder                                    3

[id number] /



                                                                      [id number]

 

          Humeral Bearing SHOULDER  Right:    Biomet              2022 XL-

[id number] /



                                        Implanted: Qty: 1 on 2017 by Greg Garcia MD at [facility]            Shoulder                                    2

[id number] /



                                                                      [id number]

 

          Humeral Tray With Locking Ring SHOULDER  Right:    Biomet             

 2027 [id number] /



                                        Implanted: Qty: 1 on 2017 by Greg Garcia MD at [facility]            Shoulder                                    2

[id number] /



                                                                      [id number]

 

          Mini Baseplate SHOULDER  Right:    Biomet              2027 0100

[id number] /



                                        Implanted: Qty: 1 on 2017 by Greg Garcia MD at [facility]            Shoulder                                    1

[id number] /



                                                                      [id number]

 

          Glenosphere SHOULDER  Right:    Biomet              2027 [id number] 

/



                                        Implanted: Qty: 1 on 2017 by Greg Garcia MD at [facility]            Shoulder                                    7

[id number] /



                                                                      [id number]

 

          Humeral Fracture Stem Stem      Right:    Biomet              20

27 [id number] /



                                        Implanted: Qty: 1 on 2017 by Greg Garcia MD at [facility]            Shoulder                                    4

[id number] /



                                                                      [id number]



documented as of this encounter



Results

Not on filedocumented in this encounter



Visit Diagnoses







                                        Diagnosis

 

                                        Dizziness







                                        Dizziness and giddiness

 

                                        PAF (paroxysmal atrial fibrillation)







                                        Atrial fibrillation



documented in this encounter



Insurance







          Payer     Benefit Plan / Subscriber ID Effective Dates Phone     Addre

ss   Type



                    Group                                             

 

          MEDICARE  MEDICARE PART xxxxxxxxxxx [id number] [phone] [address] 

Medicare



                      A & B                 t          2          [id number]



                                        



                                                            [place] 



                                                            [address] 



documented as of this encounter

## 2020-06-11 ENCOUNTER — HOSPITAL ENCOUNTER (EMERGENCY)
Dept: HOSPITAL 97 - ER | Age: 75
Discharge: HOME | End: 2020-06-11
Payer: COMMERCIAL

## 2020-06-11 VITALS — OXYGEN SATURATION: 98 %

## 2020-06-11 VITALS — DIASTOLIC BLOOD PRESSURE: 72 MMHG | SYSTOLIC BLOOD PRESSURE: 127 MMHG | TEMPERATURE: 97.8 F

## 2020-06-11 DIAGNOSIS — Z88.0: ICD-10-CM

## 2020-06-11 DIAGNOSIS — I10: ICD-10-CM

## 2020-06-11 DIAGNOSIS — N81.10: Primary | ICD-10-CM

## 2020-06-11 DIAGNOSIS — Z91.018: ICD-10-CM

## 2020-06-11 DIAGNOSIS — Z88.5: ICD-10-CM

## 2020-06-11 PROCEDURE — 99283 EMERGENCY DEPT VISIT LOW MDM: CPT

## 2020-06-11 NOTE — XMS REPORT
Continuity of Care Document

                            Created on:2020



Patient:SUNDEEP REYES

Sex:Female

:1945

External Reference #:[id number]





Demographics







                          Address                   [address]



                                                    [place] [address]

 

                          Home Phone                [phone]

 

                          Mobile Phone              [phone]

 

                          Email Address             NONE

 

                          Preferred Language        English

 

                          Marital Status            Unknown

 

                          Latter day Affiliation     Unknown

 

                          Race                      Unknown

 

                          Additional Race(s)        Unavailable



                                                    White

 

                          Ethnic Group              Not  or 









Author







                          Organization              [facility]

t

 

                          Address                   [address]



                                                    [place] [address]

 

                          Phone                     [phone]









Support







                Name            Relationship    Address         Phone

 

                Gera           Child           [address]   [phone]



                                                [place] [address] 

 

                Greg         Child           [address] [phone]



                                                [place] [address] 









Care Team Providers







                    Name                Role                Phone

 

                    Doctor Unassigned,  Name Attending Clinician Unavailable

 

                    Laci CHAPA         Attending Clinician [phone]

 

                    Mago BECKER  S       Attending Clinician [phone]

 

                    Ramses BERRIOS,  F    Attending Clinician [phone]

 

                    Chichi CHAPA          Attending Clinician [phone]

 

                    Davis CHAPA           Attending Clinician [phone]

 

                    Umer BERRIOS,  R       Attending Clinician [phone]

 

                    Jennifer CHAPA  Gene   Attending Clinician [phone]

 

                    Sharan HIGGINS,  L      Attending Clinician Unavailable

 

                    Margaret Dillard  Attending Clinician [phone]

 

                    David CHAPA         Attending Clinician [phone]

 

                    Chichi CHAPA          Admitting Clinician [phone]

 

                    David CHAPA         Admitting Clinician [phone]









Problems

This patient has no known problems.



Allergies, Adverse Reactions, Alerts

This patient has no known allergies or adverse reactions.



Medications

This patient has no known medications.



Procedures

This patient has no known procedures.



Encounters







        Start   End     Encounter Admission Attending Care    Care    Encounter 

Source



        Date/Time Date/Time Type    Type    Clinicians Facility Department ID   

   

 

        2020 Darnell HURT    1.2.840.114 [id number]

11 



        00:00:00 00:00:00 Only            Unassigned, REVA   350.1.13.10       

  



                                        [facility] 4.2.7.2.686         



                                                        [phone]         



                                                        009             

 

        2020 Telephone         [facility]    1.2.840.114 [id number]

[id number] 



        00:00:00 00:00:00                 [place]  [place] 350.1.13.10         



                                                [place] 4.2.7.2.686         



                                                [place] [phone]         



                                                [facility]     188             



                                                Building                 

 

        2020 2020-04-10 Emergency         [facility]    1.2.840.1

14 [id number] 



        13:22:30 19:51:00                 Tahira Pearce 350.1.13.

10         



                                        Gavino Cadet [place] 4.2.7.2.686      

   



                                                [place]  [phone]         



                                                        081             

 

        2020-04-10 2020-04-10 Telephone         [facility]    1.2.124.737 [id number]

[id number] 



        00:00:00 00:00:00                 [facility]  350.1.13.10         



                                                [place] 4.2.7.2.686         



                                                [place] [phone]         



                                                [facility]     044             



                                                Office                  



                                                Building                 



                                                One                     

 

        2020 Refill          [facility]    1.2.840.114 [id number]

36 



        00:00:00 00:00:00                 [facility]  350.1.13.10         



                                                [place] 4.2.7.2.686         



                                                [place] [phone]         



                                                [facility]     044             



                                                Office                  



                                                Building                 



                                                One                     

 

        2020 Emergency         [facility]    1.2.451.190 [id number]

[id number] 



        10:09:32 13:42:00                 [place] 350.1.13.10         



                                                [place] 4.2.7.2.686         



                                                [place]  [phone]         



                                                        084             

 

        2020 Telephone         [facility]    1.2.840.114 [id number]

[id number] 



        00:00:00 00:00:00                 [place] 350.1.13.10      

   



                                                [place] 4.2.7.2.686         



                                                [place] [phone]         



                                                [facility]     092             



                                                Building                 

 

        2020 Orders          Doctor HURT    1.2.840.114 [id number]

17 



        00:00:00 00:00:00 Only            Unassigned, REVA   350.1.13.10       

  



                                        [facility] 4.2.7.2.686         



                                                        [phone]         



                                                        009             

 

        2020 Transition         Gordon Tsang  1.2.840.114 [id number]

[id number] 



        00:00:00 00:00:00 of Care         Marcia Palmer   350.1.13.10         



                                                [place]   4.2.7.2.686         



                                                        [phone]         



                                                        403             

 

        2020 Telephone         [facility]    1.2.923.851 [id number]

[id number] 



        00:00:00 00:00:00                 [facility]  350.1.13.10         



                                                [place] 4.2.7.2.686         



                                                [place] [phone]         



                                                nal     044             



                                                Office                  



                                                Building                 



                                                One                     

 

        2020 Emergency         Alfredito, TANIYA [facility]    1.2.840.

114 [id number] 



        10:14:00 13:15:00                 Vito Hernandez 350.1.13.10  

       



                                                [place] 4.2.7.2.686         



                                                [place]  [phone]         



                                                        081             

 

        2020 Office          [facility]    1.2.840.114 [id number]

[id number] 



        14:22:34 15:53:57 Visit           Darnell [place] 350.1.13.10      

   



                                                [place] 4.2.7.2.686         



                                                [place] [phone]         



                                                nal     092             



                                                Building                 

 

        2020 Orders          Doctor  LICHA    1.2.840.114 [id number]

18 



        00:00:00 00:00:00 Only            Unassigned, REVA   350.1.13.10       

  



                                        [facility] 4.2.7.2.686         



                                                        [phone]         



                                                        009             







Results

This patient has no known results.

## 2020-06-11 NOTE — EDPHYS
Physician Documentation                                                                           

 [facility]                                                                 

Name: Radha Garza                                                                            

Age: 74 yrs                                                                                       

Sex: Female                                                                                       

: 1945                                                                                   

MRN: [id number]                                                                                   

Arrival Date: 2020                                                                          

Time: 12:01                                                                                       

Account#: [id number]                                                                            

Bed 7                                                                                             

Private MD:                                                                                       

ED Physician Kulwant Orlando                                                                       

HPI:                                                                                              

                                                                                             

18:13 This 74 yrs old  Female presents to ER via EMS with complaints of Urinary      kdr 

      Problem - Possible bladder prolapse.                                                        

18:13 The patient presents with Possible prolapsed bladder. Onset: The symptoms/episode       kdr 

      began/occurred suddenly, just prior to arrival. Modifying factors: The symptoms are         

      alleviated by nothing, the symptoms are aggravated by nothing. Associated signs and         

      symptoms: The patient has no apparent associated signs or symptoms. Severity of             

      symptoms: At their worst the symptoms were very mild, in the emergency department the       

      symptoms have resolved. It is unknown whether or not the patient has had similar            

      symptoms in the past. It is unknown whether or not the patient has recently seen a          

      physician. Caregivers report that she may have prolapsed her bladder. The patient has       

      no c/o at this time.                                                                        

                                                                                                  

Historical:                                                                                       

- Allergies:                                                                                      

12:41 Chocolate;                                                                              ph  

12:41 Macrobid;                                                                               ph  

12:41 Morphine;                                                                               ph  

12:41 PENICILLINS;                                                                            ph  

- PMHx:                                                                                           

12:41 Anemia; Atrial Fib; convulsions; Hyperlipidemia; Hypertension; Hypokalemia;             ph  

      Hypothyroidism; traumatic subdural hemorrhage;                                              

                                                                                                  

- Immunization history:: Adult Immunizations unknown.                                             

- Social history:: Smoking status: Patient denies any tobacco usage or history of.                

                                                                                                  

                                                                                                  

ROS:                                                                                              

18:13 Constitutional: Negative for fever, chills, and weight loss, Eyes: Negative for injury, kdr 

      pain, redness, and discharge, ENT: Negative for injury, pain, and discharge, Neck:          

      Negative for injury, pain, and swelling, Cardiovascular: Negative for chest pain,           

      palpitations, and edema, Respiratory: Negative for shortness of breath, cough,              

      wheezing, and pleuritic chest pain, Abdomen/GI: Negative for abdominal pain, nausea,        

      vomiting, diarrhea, and constipation, Back: Negative for injury and pain, MS/Extremity:     

      Negative for injury and deformity, Skin: Negative for injury, rash, and discoloration,      

      Neuro: Negative for headache, weakness, numbness, tingling, and seizure activity.           

      Psych: Negative for depression, anxiety, suicide ideation, homicidal ideation, and          

      hallucinations, Allergy/Immunology: Negative for hives, rash, and allergies, Endocrine:     

      Negative for neck swelling, polydipsia, polyuria, polyphagia, and marked weight             

      changes, Hematologic/Lymphatic: Negative for swollen nodes, abnormal bleeding, and          

      unusual bruising.                                                                           

18:13 : Positive for Possible prolapse of bladder, Negative for urinary symptoms, urinary       

      frequency.                                                                                  

                                                                                                  

Exam:                                                                                             

18:13 Constitutional:  This is a well developed, well nourished patient who is awake, alert,  kdr 

      and in no acute distress. Head/Face:  Normocephalic, atraumatic. Eyes:  Pupils equal        

      round and reactive to light, extra-ocular motions intact.  Lids and lashes normal.          

      Conjunctiva and sclera are non-icteric and not injected.  Cornea within normal limits.      

      Periorbital areas with no swelling, redness, or edema. Neck:  Trachea midline, no           

      thyromegaly or masses palpated, and no cervical lymphadenopathy.  Supple, full range of     

      motion without nuchal rigidity, or vertebral point tenderness.  No Meningismus.             

      Chest/axilla:  Normal chest wall appearance and motion.  Nontender with no deformity.       

      No lesions are appreciated. Cardiovascular:  Regular rate and rhythm with a normal S1       

      and S2.  No gallops, murmurs, or rubs.  Normal PMI, no JVD.  No pulse deficits.             

      Respiratory:  Lungs have equal breath sounds bilaterally, clear to auscultation and         

      percussion.  No rales, rhonchi or wheezes noted.  No increased work of breathing, no        

      retractions or nasal flaring. Abdomen/GI:  Soft, non-tender, with normal bowel sounds.      

      No distension or tympany.  No guarding or rebound.  No evidence of tenderness               

      throughout. Back:  No spinal tenderness.  No costovertebral tenderness.  Full range of      

      motion. Skin:  Warm, dry with normal turgor.  Normal color with no rashes, no lesions,      

      and no evidence of cellulitis. MS/ Extremity:  Pulses equal, no cyanosis.                   

      Neurovascular intact.  Full, normal range of motion. Neuro:  Awake and alert, GCS 15,       

      oriented to person, place, time, and situation.  Cranial nerves II-XII grossly intact.      

      Motor strength 5/5 in all extremities.  Sensory grossly intact.  Cerebellar exam            

      normal.  Normal gait. Psych:  Awake, alert, with orientation to person, place and time.     

       Behavior, mood, and affect are within normal limits.                                       

                                                                                                  

Vital Signs:                                                                                      

12:04  / 61; Pulse 68; Resp 18; Temp 98.1; Pulse Ox 100% on R/A;                        ph  

13:00  / 69; Pulse 62; Resp 17; Temp 97.9(O); Pulse Ox 100% on R/A;                     mh5 

14:00  / 64; Pulse 67; Resp 18; Pulse Ox 98% ;                                          ph  

15:41  / 72; Pulse 64; Resp 18; Temp 97.8; Pulse Ox 98% on R/A;                         ph  

                                                                                                  

MDM:                                                                                              

14:09 Patient medically screened.                                                             kdr 

18:13 Data reviewed: vital signs, nurses notes.                                               kdr 

                                                                                                  

Administered Medications:                                                                         

No medications were administered                                                                  

                                                                                                  

                                                                                                  

Disposition:                                                                                      

20 14:09 Discharged to Home. Impression: Prolapsed bladder - resolved.                      

- Condition is Stable.                                                                            

- Discharge Instructions: Pelvic Organ Prolapse.                                                  

                                                                                                  

- Medication Reconciliation Form, Thank You Letter form.                                          

- Follow up: Private Physician; When: 2 - 3 days; Reason: If symptoms return, Further             

  diagnostic work-up, Recheck today's complaints, Continuance of care, Re-evaluation by           

  your physician.                                                                                 

- Problem is new.                                                                                 

- Symptoms are resolved.                                                                          

                                                                                                  

                                                                                                  

                                                                                                  

Signatures:                                                                                       

Kulwant Orlando MD MD   kdr                                                  

Radha Jimenez RN                      RN   ph                                                   

                                                                                                  

Corrections: (The following items were deleted from the chart)                                    

16:12 14:09 2020 14:09 Discharged to Home. Impression: Prolapsed bladder - resolved.    ph  

      Condition is Stable. Forms are Medication Reconciliation Form, Thank You Letter,            

      Antibiotic Education, Prescription Opioid Use. Follow up: Private Physician; When: 2 -      

      3 days; Reason: If symptoms return, Further diagnostic work-up, Recheck today's             

      complaints, Continuance of care, Re-evaluation by your physician. Problem is new.           

      Symptoms are resolved. kdr                                                                  

                                                                                                  

**************************************************************************************************

## 2020-06-11 NOTE — ER
Nurse's Notes                                                                                     

 [facility]                                                                 

Name: Radha Garza                                                                            

Age: 74 yrs                                                                                       

Sex: Female                                                                                       

: 1945                                                                                   

MRN: [id number]                                                                                   

Arrival Date: 2020                                                                          

Time: 12:01                                                                                       

Account#: [id number]                                                                            

Bed 7                                                                                             

Private MD:                                                                                       

Diagnosis: Prolapsed bladder - resolved                                                           

                                                                                                  

Presentation:                                                                                     

                                                                                             

12:04 Chief complaint: EMS states: Pt from [place], staff was showering pt when they        ph  

      noticed what appears to be a prolapsed bladder or uterus, pt denies pain or urinary         

      symptoms, hx of dementia. Coronavirus screen: Patient denies a cough. Patient denies        

      shortness of breath or difficulty breathing. Patient denies measured and/or subjective      

      temperature greater than 100.4F prior to today's visit. Patient denies travel on a          

      cruise ship or to a country the [facility] currently lists as an affected area. Patient denies     

      contact with known and/or suspected case of COVID-19. Ebola Screen: No symptoms or          

      risks identified at this time. Initial Sepsis Screen: Does the patient meet any 2           

      criteria? No. Patient's initial sepsis screen is negative. Does the patient have a          

      suspected source of infection? No. Patient's initial sepsis screen is negative. Risk        

      Assessment: Do you want to hurt yourself or someone else? Patient reports no desire to      

      harm self or others. Onset of symptoms was 2020.                                   

12:04 Method Of Arrival: EMS: [facility]                                                       ph  

12:04 Acuity: OZZIE 3                                                                           ph  

                                                                                                  

Historical:                                                                                       

- Allergies:                                                                                      

12:41 Chocolate;                                                                              ph  

12:41 Macrobid;                                                                               ph  

12:41 Morphine;                                                                               ph  

12:41 PENICILLINS;                                                                            ph  

- PMHx:                                                                                           

12:41 Anemia; Atrial Fib; convulsions; Hyperlipidemia; Hypertension; Hypokalemia;             ph  

      Hypothyroidism; traumatic subdural hemorrhage;                                              

                                                                                                  

- Immunization history:: Adult Immunizations unknown.                                             

- Social history:: Smoking status: Patient denies any tobacco usage or history of.                

                                                                                                  

                                                                                                  

Screenin:42 Abuse screen: Denies threats or abuse. Denies injuries from another. Nutritional        ph  

      screening: No deficits noted. Tuberculosis screening: No symptoms or risk factors           

      identified. Fall Risk None identified.                                                      

                                                                                                  

Assessment:                                                                                       

12:32 Reassessment: Nurse accompanied DR Orlando to assess pt for prolapse, no bulging noted  ph  

      to vagina or urethral area, pt denies pain or difficulty urinating.                         

13:00 General: Appears in no apparent distress. comfortable, well groomed, Behavior is calm,  ph  

      cooperative, appropriate for age. Pain: Denies pain. Neuro: Level of Consciousness is       

      awake, alert, obeys commands, Oriented to person, place. Cardiovascular: Capillary          

      refill < 3 seconds in bilateral fingers Patient's skin is warm and dry. Respiratory:        

      Airway is patent Respiratory effort is even, unlabored, Respiratory pattern is regular,     

      symmetrical. : Genitalia appear normal. Derm: Skin is intact, is healthy with good        

      turgor, Skin is pink, warm \T\ dry. Musculoskeletal: Circulation, motion, and sensation     

      intact. Range of motion: intact in all extremities.                                         

14:30 Reassessment: Patient appears in no apparent distress at this time. Patient and/or      ph  

      family updated on plan of care and expected duration. Pain level reassessed. Pt awake       

      and alert,oriented to person only.                                                          

15:37 Reassessment: Patient appears in no apparent distress at this time. Patient and/or      ph  

      family updated on plan of care and expected duration. Pain level reassessed. Pt up to       

      bedside commode to urinate, checked rectal and vaginal area again for bulges or             

      prolapse, none noted, report called to Chani HIGGINS at [place], awaiting transport back       

      to facility.                                                                                

                                                                                                  

Vital Signs:                                                                                      

12:04  / 61; Pulse 68; Resp 18; Temp 98.1; Pulse Ox 100% on R/A;                        ph  

13:00  / 69; Pulse 62; Resp 17; Temp 97.9(O); Pulse Ox 100% on R/A;                     mh5 

14:00  / 64; Pulse 67; Resp 18; Pulse Ox 98% ;                                          ph  

15:41  / 72; Pulse 64; Resp 18; Temp 97.8; Pulse Ox 98% on R/A;                         ph  

                                                                                                  

ED Course:                                                                                        

12:01 Patient arrived in ED.                                                                  ph  

12:10 Kulwant Orlando MD is Attending Physician.                                              kdr 

12:14 Triage completed.                                                                       ph  

12:38 Radha Jimenez, RN is Primary Nurse.                                                    ph  

12:42 Patient has correct armband on for positive identification. Placed in gown. Bed in low  ph  

      position. Call light in reach. Side rails up X 1. Pulse ox on. NIBP on. Door closed.        

      Noise minimized. Warm blanket given.                                                        

12:42 Arm band placed on Patient placed in an exam room.                                      ph  

15:43 Diet: Patient given snack.                                                              mh5 

16:00 No provider procedures requiring assistance completed. Patient did not have IV access   ph  

      during this emergency room visit.                                                           

                                                                                                  

Administered Medications:                                                                         

No medications were administered                                                                  

                                                                                                  

                                                                                                  

Outcome:                                                                                          

14:09 Discharge ordered by MD.                                                                kdr 

16:12 Patient left the ED.                                                                    ph  

16:12 Discharged to nursing home. Report called to  RONALDO Wilde                                 ph  

16:12 Condition: good                                                                             

16:12 Discharge instructions given to nursing home.                                               

                                                                                                  

Signatures:                                                                                       

Kulwant Orlando MD MD   kdr                                                  

Radha Jimenez RN                      RN                                                      

Jayshree Baca                              [facility]                                                  

                                                                                                  

Corrections: (The following items were deleted from the chart)                                    

17:58 14:30 Reassessment: Patient appears in no apparent distress at this time. Patient       ph  

      and/or family updated on plan of care and expected duration. Pain level reassessed. Pt      

      awake and alert,oriented to person only ph                                                  

                                                                                                  

**************************************************************************************************

## 2020-06-11 NOTE — XMS REPORT
Summary of Care

                             Created on:May 19, 2020



Patient:Radha Garza

Sex:Female

:1945

External Reference #:[id number]





Demographics







                          Address                   [address]



                                                    [place] [address]

 

                          Home Phone                [phone]

 

                          Mobile Phone              [phone]

 

                          Phone                     [phone]

 

                          Email Address             [email or web address]

 

                          Preferred Language        English

 

                          Marital Status            

 

                          Confucianism Affiliation     Unknown

 

                          Race                      White

 

                          Ethnic Group              Not  or 









Author







                          Organization              [facility]

 

                          Address                   [address] [place] [address]









Support







                Name            Relationship    Address         Phone

 

                Yin Boss     Unavailable     [address]   [phone]



                                                [place] [address] 

 

                Ryan Alexandroriki Unavailable     [address] [phone]



                                                [place] [address] 









Care Team Providers







                    Name                Role                Phone

 

                    MD Davis         Primary Care Provider [phone]









Encounter Details







             Date         Type         Department   Care Team    Description

 

                    2020          Orders Only         [facility]



                          Doctor Unassigned, No     



                                                            [address] [facility]



                                        Name



                                        



                                                [place] [address] [address] [place] [address] 







Allergies







             Active Allergy Reactions    Severity     Noted Date   Comments

 

             Chocolate Flavor Anxiety                   2015   

 

             Nitrofurantoin Unknown - See              2015   



             Monohyd/M-Cryst comments                               

 

             Morphine     Nausea and/or              2017   



                          Vomiting                               

 

             Penicillin   Other - See comments              2017   Pt stat

es "several



                                                                 family members 

are



                                                                 allergic so i t

hink



                                                                 i am"



documented as of this encounter (statuses as of 2020)



Medications







          Medication Sig       Dispensed Refills   Start Date End Date  Status

 

          lovastatin 20 mg tablet Take 20 mg by           0         2017  

         Active



                    mouth daily.                                         

 

          traMADOL (ULTRAM) 50 mg Take 1 tablet by 20 tablet 0         

8           Active



          tablet    mouth every 6                                         



                    (six) hours as                                         



                    needed for Pain                                         



                    (scale 4-6).                                         

 

          mupirocin 2 % Apply  to 22 g      0         2018           Activ

e



          ointmentIndications: area(s) 3                                        

 



          Abscess   (three) times                                         



                    daily.                                            

 

          Ferrous Fumarate-Folic Take 1 tablet by 90 tablet 1         2019

           Active



          Acid (HEMATINIC/FOLIC mouth daily.                                    

     



          ACID) 324 mg (106 mg                                                  

 



          iron)-1 mg                                                   



          TabIndications: Iron                                                  

 



          deficiency anemia,                                                   



          unspecified iron                                                   



          deficiency anemia type                                                

   

 

          vitamin B-12 1,000 mcg Take 1 tablet by 30 tablet 0         2020

           Active



          tabletIndications: mouth daily.                                       

  



          Dizziness, PAF                                                   



          (paroxysmal atrial                                                   



          fibrillation)                                                   

 

          metoprolol succinate XL Take 1 tablet by 30 tablet 0         

0           Active



          50 mg 24 hr mouth daily.                                         



          tabletIndications:                                                   



          Dizziness, PAF                                                   



          (paroxysmal atrial                                                   



          fibrillation)                                                   

 

          doxycycline monohydrate Take 100 mg by           0                    

         Active



          100 mg capsule mouth 2 (two)                                         



                    times daily.                                         

 

          ciprofloxacin HCl 500 mg Take 500 mg by           0                   

          Active



          tablet    mouth 2 (two)                                         



                    times daily.                                         

 

          hydrocortisone Insert 1 Dose           0                             A

ctive



          (PROCTOSOL HC RECTAL) into rectum 2                                   

      



                    (two) times                                         



                    daily.                                            

 

          rivastigmine 4.6 mg/24 Apply 1 Patch to           0         2020

           Active



          hr patch  skin daily.                                         

 

          ciclopirox (PENLAC) 8 % Apply 1 Dose to           0                   

          Active



          solution  area(s) at                                         



                    bedtime. Apply                                         



                    to Nails.                                         

 

          Cholecalciferol, Vitamin Take 1 capsule           0                   

          Active



          D3, (VITAMIN D3) 25 mcg by mouth daily.                               

          



          (1,000 unit) capsule                                                  

 



documented as of this encounter (statuses as of 2020)



Active Problems







                          Problem                   Noted Date

 

                          Opacity of lung on imaging study 04/10/2020

 

                          Syncope                   2020

 

                          LOC (loss of consciousness) 2020

 

                          PAF (paroxysmal atrial fibrillation) 2020

 

                          Orthostatic hypotension   2020

 

                          Dizziness                 2020

 

                          S/p reverse total shoulder arthroplasty 2017

 

                          Right arm pain            2017

 

                          Essential hypertension    03/10/2016

 

                          Hyperlipemia              03/10/2016

 

                          Hypothyroidism            03/10/2016

 

                          Iron (Fe) deficiency anemia 03/10/2016

 

                          Vitamin D deficiency      03/10/2016



documented as of this encounter (statuses as of 2020)



Immunizations







                    Name                Administration Dates Next Due

 

                    Influenza High Dose 2019, 10/12/2017 

 

                    Pneumococcal Polysaccharide, PPSV23 (PNEUMOVAX) 2019  

        



documented as of this encounter



Social History







             Tobacco Use  Types        Packs/Day    Years Used   Date

 

             Never Smoker Cigarettes                1            Quit: 03/10/198

0









                Smokeless Tobacco: Never Used                                 









                Alcohol Use     Drinks/Week     oz/Week         Comments

 

                Yes                                             Occasional Drink

er









                          Sex Assigned at Birth     Date Recorded

 

                          Not on file               









                    Job Start Date      Occupation          Industry

 

                    Not on file         Not on file         Not on file









                    Travel History      Travel Start        Travel End









                                        No recent travel history available.



documented as of this encounter



Last Filed Vital Signs

Not on filedocumented in this encounter



Plan of Treatment







                Health Maintenance Due Date        Last Done       Comments

 

                DTaP,Tdap,and Td Vaccines (1 - Tdap) 1954                 

     

 

                Zoster Recombinant Vaccine (SHINGRIX) (1 1993             

         



                of 2)                                           

 

                Medicare Wellness Visit 2008                      

 

                Osteoporosis Screening 2008                      

 

                PNEUMOCOCCAL VACCINES 65+ (2 of 2 - PCV13) 2020      

 

                INFLUENZA VACCINE Completed       2019, 10/12/2017 



documented as of this encounter



Implants







          Implanted Type      Area       Device    Shelf     Model /



                                                  Identifier Expiration Serial /

 Lot



                                                            Date      

 

          Cement    CEMENT    Right:    Biomet              2018 [id number]

 /



                                        Implanted: Qty: 1 on 2017 by Greg Garcia MD at [facility]            Shoulder                                    6

[id number] /



                                                                      [id number]

 

          Cement    CEMENT    Right:    Biomet              2018 [id number]

1 /



                                        Implanted: Qty: 1 on 2017 by Greg Garcia MD at [facility]            Shoulder                                    6

[id number] /



                                                                      [id number]

 

          Central Screw SCREW     Right:    Biomet              08/10/2027 11703

5 /



                                        Implanted: Qty: 1 on 2017 by Greg Garcia MD at [facility]            Shoulder                                    7

[id number] /



                                                                      [id number]

 

          Locking Screw SCREW     Right:    Biomet              2027 50401

0 /



                                        Implanted: Qty: 1 on 2017 by Greg Garcia MD at [facility]            Shoulder                                    4

[id number] /



                                                                      [id number]

 

          Locking Screw SCREW     Right:    Biomet              2027 77318

0 /



                                        Implanted: Qty: 1 on 2017 by Greg Garcia MD at [facility]            Shoulder                                    2

[id number] /



                                                                      [id number]

 

          Nonlocking Screw SCREW     Right:    Biomet              2027 18

0557 /



                                        Implanted: Qty: 1 on 2017 by Greg Garcia MD at [facility]            Shoulder                                    8

 /



                                                                      [id number]

 

          Nonlocking Screw SCREW     Right:    Biomet              2027 18

0557 /



                                        Implanted: Qty: 1 on 2017 by Greg Garcia MD at [facility]            Shoulder                                    4

[id number] /



                                                                      [id number]

 

          Bone Cement Restrictor SHOULDER  Right:    Biomet              

021 [id number] /



                                        Implanted: Qty: 1 on 2017 by Greg Garcia MD at [facility]            Shoulder                                    3

[id number] /



                                                                      [id number]

 

          Humeral Bearing SHOULDER  Right:    Biomet              2022 XL-

[id number] /



                                        Implanted: Qty: 1 on 2017 by Greg Garcia MD at [facility]            Shoulder                                    2

[id number] /



                                                                      [id number]

 

          Humeral Tray With Locking Ring SHOULDER  Right:    Biomet             

 2027 [id number] /



                                        Implanted: Qty: 1 on 2017 by Greg Garcia MD at [facility]            Shoulder                                    2

[id number] /



                                                                      [id number]

 

          Mini Baseplate SHOULDER  Right:    Biomet              2027 0100

[id number] /



                                        Implanted: Qty: 1 on 2017 by Greg Garcia MD at [facility]            Shoulder                                    1

[id number] /



                                                                      [id number]

 

          Glenosphere SHOULDER  Right:    Biomet              2027 [id number] 

/



                                        Implanted: Qty: 1 on 2017 by Greg Garcia MD at [facility]            Shoulder                                    7

[id number] /



                                                                      [id number]

 

          Humeral Fracture Stem Stem      Right:    Biomet              20

27 [id number] /



                                        Implanted: Qty: 1 on 2017 by Greg Garcia MD at [facility]            Shoulder                                    4

[id number] /



                                                                      [id number]



documented as of this encounter



Procedures







             Procedure Name Priority     Date/Time    Associated Diagnosis Comme

nts

 

             EXTERNAL PROVIDER Routine      2020 12:01 AM CDT             

 



             RECORDS                                             



documented in this encounter



Results

Not on filedocumented in this encounter



Insurance







          Payer     Benefit Plan / Subscriber ID Effective Dates Phone     Addre

ss   Type



                    Group                                             

 

          MEDICARE  MEDICARE PART xxxxxxxxxxx [id number] [phone] [address] 

Medicare



                      [address]                 t          [address]          [id number]



                                        



                                                            [place], [place] 



                                                            [address] 



documented as of this encounter

## 2022-02-06 ENCOUNTER — HOSPITAL ENCOUNTER (EMERGENCY)
Dept: HOSPITAL 97 - ER | Age: 77
Discharge: HOME | End: 2022-02-06
Payer: COMMERCIAL

## 2022-02-06 VITALS — OXYGEN SATURATION: 97 % | SYSTOLIC BLOOD PRESSURE: 97 MMHG | DIASTOLIC BLOOD PRESSURE: 84 MMHG

## 2022-02-06 VITALS — TEMPERATURE: 98.2 F

## 2022-02-06 DIAGNOSIS — F03.90: ICD-10-CM

## 2022-02-06 DIAGNOSIS — S09.90XA: Primary | ICD-10-CM

## 2022-02-06 DIAGNOSIS — Z88.8: ICD-10-CM

## 2022-02-06 DIAGNOSIS — S00.531A: ICD-10-CM

## 2022-02-06 DIAGNOSIS — Z91.018: ICD-10-CM

## 2022-02-06 DIAGNOSIS — S80.01XA: ICD-10-CM

## 2022-02-06 DIAGNOSIS — I10: ICD-10-CM

## 2022-02-06 DIAGNOSIS — Z88.0: ICD-10-CM

## 2022-02-06 DIAGNOSIS — S80.02XA: ICD-10-CM

## 2022-02-06 DIAGNOSIS — Z88.5: ICD-10-CM
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
## 2022-02-06 LAB
BUN BLD-MCNC: 15 MG/DL (ref 7–18)
GLUCOSE SERPLBLD-MCNC: 113 MG/DL (ref 74–106)
HCT VFR BLD CALC: 37.6 % (ref 36–45)
INR BLD: 1.02
LYMPHOCYTES # SPEC AUTO: 1.1 K/UL (ref 0.7–4.9)
PMV BLD: 7.4 FL (ref 7.6–11.3)
POTASSIUM SERPL-SCNC: 4.3 MMOL/L (ref 3.5–5.1)
RBC # BLD: 4.24 M/UL (ref 3.86–4.86)

## 2022-02-06 PROCEDURE — 85730 THROMBOPLASTIN TIME PARTIAL: CPT

## 2022-02-06 PROCEDURE — 72125 CT NECK SPINE W/O DYE: CPT

## 2022-02-06 PROCEDURE — 85025 COMPLETE CBC W/AUTO DIFF WBC: CPT

## 2022-02-06 PROCEDURE — 70450 CT HEAD/BRAIN W/O DYE: CPT

## 2022-02-06 PROCEDURE — 76377 3D RENDER W/INTRP POSTPROCES: CPT

## 2022-02-06 PROCEDURE — 36415 COLL VENOUS BLD VENIPUNCTURE: CPT

## 2022-02-06 PROCEDURE — 72170 X-RAY EXAM OF PELVIS: CPT

## 2022-02-06 PROCEDURE — 99284 EMERGENCY DEPT VISIT MOD MDM: CPT

## 2022-02-06 PROCEDURE — 93005 ELECTROCARDIOGRAM TRACING: CPT

## 2022-02-06 PROCEDURE — 80048 BASIC METABOLIC PNL TOTAL CA: CPT

## 2022-02-06 PROCEDURE — 71045 X-RAY EXAM CHEST 1 VIEW: CPT

## 2022-02-06 PROCEDURE — 70486 CT MAXILLOFACIAL W/O DYE: CPT

## 2022-02-06 PROCEDURE — 85610 PROTHROMBIN TIME: CPT

## 2022-02-06 NOTE — RAD REPORT
EXAM DESCRIPTION:  CT - [id number] - 2/6/2022 11:50 am

 

CLINICAL HISTORY:  Trauma, head and neck injury.

fall, facial injury, head injury

 

COMPARISON:  Head C Spine Mpr Wo Con dated 5/14/2020; Facial Bones W/ Mpr dated 2/6/2022

 

TECHNIQUE:  Axial 5 mm thick images of the head were obtained.

 

Axial 2 mm thick images of the cervical spine were obtained with sagittal and coronal reconstruction 
images generated and reviewed.

 

All CT scans are performed using dose optimization technique as appropriate and may include automated
 exposure control or mA/KV adjustment according to patient size.

 

FINDINGS:  CT HEAD WITHOUT CONTRAST:

 

No acute hemorrhage, hydrocephalus or extra-axial collection is identified.Mild generalized brain atr
ophy is present with mild periventricular and deep white matter chronic microvascular ischemic change
s.No areas of brain edema or midline shift.

 

The paranasal sinuses and mastoids are clear.The calvarium is intact.

 

CT CERVICAL SPINE WITHOUT CONTRAST:

 

No fracture or subluxation.Mild mid and lower cervical degenerative changes.No prevertebral soft tiss
ues swelling is identified.

 

IMPRESSION:  No acute intracranial or cervical spine findings.

## 2022-02-06 NOTE — EDPHYS
Physician Documentation                                                                           

 [facility]                                                                 

Name: Radha Garza                                                                            

Age: 76 yrs                                                                                       

Sex: Female                                                                                       

: 1945                                                                                   

MRN: [id number]                                                                                   

Arrival Date: 2022                                                                          

Time: 10:27                                                                                       

Account#: [id number]                                                                            

Bed 4                                                                                             

Private MD:                                                                                       

ED Physician Delfin Finley                                                                         

HPI:                                                                                              

                                                                                             

10:35 This 76 yrs old Unknown Female presents to ER via Unassigned with complaints of Fall    rn  

      Injury.                                                                                     

10:35 Details of fall: The patient fell from an upright position, while standing.             rn  

10:35 Onset: The symptoms/episode began/occurred just prior to arrival. Associated injuries:  rn  

      The patient sustained injury to the head, both knees/lower ext. Severity of symptoms:       

      At their worst the symptoms were moderate, in the emergency department the symptoms are     

      unchanged. It is unknown whether or not the patient has had similar symptoms in the         

      past. Per EMS, patient status post fall, unclear as they do not believe it was              

      witnessed. Primarily facial and head injuries. Patient with dementia and unknown            

      baseline. No known blood thinners..                                                         

                                                                                                  

Historical:                                                                                       

- Allergies:                                                                                      

10:41 Chocolate;                                                                              ke1 

10:41 Macrobid;                                                                               ke1 

10:41 Morphine;                                                                               ke1 

10:41 PENICILLINS;                                                                            ke1 

- PMHx:                                                                                           

10:42 Anemia; Atrial Fib; convulsions; Hyperlipidemia; Hypertension; Hypokalemia;             ke1 

      Hypothyroidism; traumatic subdural hemorrhage;                                              

10:43 Dementia;                                                                               ke1 

                                                                                                  

- Immunization history:: Adult Immunizations unknown.                                             

- Social history:: Smoking status: unknown.                                                       

- Family history:: not pertinent.                                                                 

- Hospitalizations: : No recent hospitalization is reported.                                      

- History obtained from: EMS.                                                                     

                                                                                                  

                                                                                                  

ROS:                                                                                              

10:39 Constitutional: Negative for fever, chills, and weight loss, Eyes: Negative for injury, rn  

      pain, redness, and discharge, Neck: Negative for injury, pain, and swelling,                

      Cardiovascular: Negative for chest pain, palpitations, and edema, Respiratory: Negative     

      for shortness of breath, cough, wheezing, and pleuritic chest pain, Abdomen/GI:             

      Negative for abdominal pain, nausea, vomiting, diarrhea, and constipation, Back:            

      Negative for injury and pain, MS/Extremity: Positive for pain to legs Skin: Negative        

      for injury, rash, and discoloration, Neuro: Positive for headache and facial injury         

                                                                                                  

Exam:                                                                                             

10:39 Constitutional:  This is a well developed, well nourished patient who is awake, alert,  rn  

      mild facial trauma Head/Face:  Normocephalic, mild nasal swelling and tenderness with       

      dry blood in nares.  No septal hematoma.  Upper lip with swelling and ecchymosis, no        

      internal laceration noted Eyes:  Pupils equally round and reactive to light.                

      Periorbital areas with no swelling, redness, or edema. Chest/axilla:  No crepitus           

      Cardiovascular:  Regular rate and rhythm.  No pulse deficits. Respiratory:  No              

      increased work of breathing, no retractions or nasal flaring. Abdomen/GI:  Soft,            

      non-tender, with normal bowel sounds.  No distension or tympany.  No guarding or            

      rebound.  No evidence of tenderness throughout. Skin:  Warm, dry MS/ Extremity:  Pulses     

      equal, no cyanosis.  Neurovascular intact.  Painful range of motion of bilateral lower      

      extremities, unable to localize where her pain is.  Mild abrasions to both knees.  No       

      lacerations Neuro:  Awake and alert, GCS 15, oriented to person but not place or time.      

      4 out of 5 strength in all extremities                                                      

                                                                                                  

Vital Signs:                                                                                      

10:34  / 70; Pulse 66; Resp 17; Temp 98.2; Pulse Ox 99% on R/A;                         ke1 

11:59  / 67; Pulse 68; Resp 16 S; Pulse Ox 100% on R/A;                                 jd3 

13:01 BP 97 / 84; Pulse 73; Resp 16 S; Pulse Ox 97% on R/A;                                   jd3 

                                                                                                  

MDM:                                                                                              

10:29 Patient medically screened.                                                             rn  

12:35 Differential diagnosis: closed head injury, contusion, fracture, sprain, strain. Data   rn  

      reviewed: vital signs, nurses notes, lab test result(s), radiologic studies, CT scan,       

      plain films, and as a result, I will discharge patient. Counseling: I had a detailed        

      discussion with the patient and/or guardian regarding: the historical points, exam          

      findings, and any diagnostic results supporting the discharge/admit diagnosis, lab          

      results, radiology results, the need for outpatient follow up, to return to the             

      emergency department if symptoms worsen or persist or if there are any questions or         

      concerns that arise at home. Special discussion: Based on the patient's history, exam       

      and DX evaluation, there is no indication for emergent intervention or inpatient TX. It     

      is understood by the patient/guardian that if the SXs persist or worsen they need to        

      return immediately for re-evaluation. I discussed with the patient/guardian in detail       

      that at this point there is no indication for admission to the hospital. It is              

      understood, however, that if the symptoms persist or worsen the patient needs to return     

      immediately for re-evaluation. ED course: No acute findings in imaging. No fracture. No     

      lacerations to suture. Will dc home with return precautions..                               

                                                                                                  

                                                                                             

10:33 Order name: CBC with Diff; Complete Time: 11:                                         rn  

                                                                                             

10:33 Order name: Basic Metabolic Panel; Complete Time: 11:                                 rn  

                                                                                             

10:33 Order name: CT Head C Spine; Complete Time: 12:                                       rn  

                                                                                             

10:33 Order name: CT Facial Bones W/O Con; Complete Time: 12:                               rn  

                                                                                             

10:33 Order name: Protime (+inr); Complete Time: 11:                                        rn  

                                                                                             

10:33 Order name: Ptt, Activated; Complete Time: 11:                                        rn  

                                                                                             

10:33 Order name: XRAY Chest (1 view); Complete Time: 12:02                                   rn  

                                                                                             

10:33 Order name: XRAY Pelvis; Complete Time: 12:02                                           rn  

                                                                                             

10:33 Order name: XRAY Knee LEFT 2 view; Complete Time: 12:02                                 rn  

                                                                                             

10:33 Order name: XRAY Knee RIGHT 2 view; Complete Time: 12:02                                rn  

                                                                                             

10:33 Order name: XRAY Hip RIGHT 2 view; Complete Time: 12:02                                 rn  

                                                                                             

10:33 Order name: XRAY Hip LEFT 2 view; Complete Time: 12:02                                  rn  

                                                                                             

10:33 Order name: IV Start; Complete Time: 10:52                                              rn  

                                                                                             

10:34 Order name: EKG; Complete Time: 10:34                                                   rn  

                                                                                             

10:34 Order name: EKG - Nurse/Tech; Complete Time: 10:52                                      rn  

                                                                                                  

Administered Medications:                                                                         

No medications were administered                                                                  

                                                                                                  

                                                                                                  

Disposition Summary:                                                                              

22 12:38                                                                                    

Discharge Ordered                                                                                 

      Location: Home                                                                          rn  

      Problem: new                                                                            rn  

      Symptoms: have improved                                                                 rn  

      Condition: Stable                                                                       rn  

      Diagnosis                                                                                   

        - Unspecified injury of head, initial encounter                                       rn  

        - Contusion of left knee                                                              rn  

        - Contusion of right knee                                                             rn  

        - Contusion of lip                                                                    rn  

      Followup:                                                                               rn  

        - With: Private Physician                                                                  

        - When: As needed                                                                          

        - Reason: Recheck today's complaints, Re-evaluation by your physician                      

      Discharge Instructions:                                                                     

        - Discharge Summary Sheet                                                             rn  

        - Contusion                                                                           rn  

        - Head Injury, Adult                                                                  rn  

      Forms:                                                                                      

        - Medication Reconciliation Form                                                      rn  

        - Thank You Letter                                                                    rn  

        - Antibiotic Education                                                                rn  

        - Prescription Opioid Use                                                             rn  

Signatures:                                                                                       

Dispatcher [employer]                           EDDelfin Mckinley MD MD rn Davies, Jonathon, RN                    RN   jd3                                                  

Karla Corona RN                   RN   ke1                                                  

                                                                                                  

Corrections: (The following items were deleted from the chart)                                    

10:39 10:35 Per EMS. rn                                                                       rn  

                                                                                                  

**************************************************************************************************

## 2022-02-06 NOTE — RAD REPORT
EXAM DESCRIPTION:  RAD - Knee Left 2 View - 2/6/2022 11:45 am

 

CLINICAL HISTORY:  PAIN

 

COMPARISON:  No comparisons

 

FINDINGS:  Advanced tricompartmental osteoarthritis is present. No acute fracture or dislocation seen
. Small volume of suprapatellar joint fluid.

## 2022-02-06 NOTE — RAD REPORT
EXAM DESCRIPTION:  RAD - Chest Single View - 2/6/2022 11:45 am

 

CLINICAL HISTORY:  BLUNT CHEST TRAUMA

Chest pain.

 

COMPARISON:  Chest Pa And Lat (2 Views) dated 12/23/2019

 

FINDINGS:  Portable technique limits examination quality.

 

Large hiatal hernia. Linear atelectasis in the left lung base is seen. The lungs are otherwise clear.
 The heart is normal in size. No displaced fractures.

## 2022-02-06 NOTE — RAD REPORT
EXAM DESCRIPTION:  RAD - Knee Right 2 View - 2/6/2022 11:46 am

 

CLINICAL HISTORY:  PAIN

 

COMPARISON:  No comparisons

 

FINDINGS:  Mild-to-moderate osteoarthritis is present. No fracture seen. Small suprapatellar joint ef
fusion.

## 2022-02-06 NOTE — RAD REPORT
EXAM DESCRIPTION:  CT - CTFB

 

CLINICAL HISTORY:  FACIAL PAIN

Trauma, facial pain and swelling

 

COMPARISON:  No comparisons

 

TECHNIQUE:  Axial 2 mm thick images of the face were obtained with sagittal and coronal reconstructio
n images.

 

All CT scans are performed using dose optimization technique as appropriate and may include automated
 exposure control or mA/KV adjustment according to patient size.

 

FINDINGS:  No acute facial bone fracture is seen.Soft tissue swelling is seen about nasal region with
out displaced nasal bone fracture seen.The mandible is intact.

 

The globes and orbital contents are grossly unremarkable.The paranasal sinuses and mastoids are clear
.

 

 

IMPRESSION:  Negative for facial bone fracture.

## 2022-02-06 NOTE — XMS REPORT
Continuity of Care Document

                           Created on:2022



Patient:SUNDEEP REYES

Sex:Female

:1945

External Reference #:[id number]





Demographics







                          Address                   [address]



                                                    [place] [address]

 

                          Home Phone                [phone]

 

                          Mobile Phone              [phone]

 

                          Email Address             NONE

 

                          Preferred Language        English

 

                          Marital Status            Unknown

 

                          Sabianism Affiliation     Unknown

 

                          Race                      Unknown

 

                          Additional Race(s)        White



                                                    Unavailable

 

                          Ethnic Group              Not  or 









Author







                          Organization              [facility]

t

 

                          Address                   [address]



                                                    [place] [address]

 

                          Phone                     [phone]









Support







                Name            Relationship    Address         Phone

 

                Gera           Child           [address]   [phone]



                                                [place] [address] 

 

                Greg         Child           [address] [phone]



                                                [place] [address] 









Care Team Providers







                    Name                Role                Phone

 

                    YENIFER              Primary Care Physician Unavailable

 

                    Doctor Unassigned,  Name Attending Clinician Unavailable

 

                    Laci CHAPA         Attending Clinician [phone]

 

                    Mago BECKER  S       Attending Clinician [phone]

 

                    Ramses BERRIOS,  F    Attending Clinician [phone]

 

                    Chichi CHAPA          Attending Clinician [phone]

 

                    CHICHI             Attending Clinician Unavailable

 

                    Yenifer CHAPA           Attending Clinician [phone]

 

                    Malathi BERRIOS  R       Attending Clinician [phone]

 

                    EDWIN SERVIN           Attending Clinician Unavailable

 

                    YENIFER              Attending Clinician Unavailable

 

                    Desmond Rodriguez MD   Attending Clinician [phone]

 

                    Sharan HIGGINS,  L      Attending Clinician Unavailable

 

                    Margaret Dillard  Attending Clinician [phone]

 

                    Kecia CHAPA         Attending Clinician [phone]

 

                    KECIA            Attending Clinician Unavailable

 

                    2,  Lab             Attending Clinician Unavailable

 

                    Chichi CHAPA          Admitting Clinician [phone]

 

                    CHICHI             Admitting Clinician Unavailable

 

                    EDWIN SERVIN           Admitting Clinician Unavailable

 

                    Kecia CHAPA         Admitting Clinician [phone]

 

                    KECIA            Admitting Clinician Unavailable









Payers







           Payer Name Policy Type Policy Number Effective Date Expiration Date S

ource







Problems







       Condition Condition Condition Status Onset  Resolution Last   Treating Co

mments 

Source



       Name   Details Category        Date   Date   Treatment Clinician        



                                                 Date                 

 

       Opacity of Opacity of Disease Active                              U

nivers



       lung on lung on               4-10                               ity of



       imaging imaging               00:00:                             [place]



       study  study                00                                 Medical



                                                                      Branch

 

       Syncope Syncope Disease Active -                             [facility]



                                   4-09                               ity of



                                   00:00:                             [place]



                                   00                                 Medical



                                                                      Branch

 

       LOC (loss LOC (loss Disease Active                              Uni

vers



       of     of                   2-22                               ity of



       consciousn consciousn               00:00:                             [place]



       ess)   ess)                 00                                 Medical



                                                                      Branch

 

       PAF    PAF    Disease Active                              [facility]



       (paroxysma (paroxysma               2-22                               it

y of



       l atrial l atrial               00:00:                             [place]



       fibrillati fibrillati               00                                 Me

dical



       on)    on)                                                     Branch

 

       Orthostati Orthostati Disease Active                              U

nivers



       c      c                    2-22                               ity of



       hypotensio hypotensio               00:00:                             [facility]

[place]



       n      n                                                     Medical



                                                                      Branch

 

       Dizziness Dizziness Disease Active                              Uni

vers



                                   2-21                               ity of



                                   00:00:                             [place]



                                   00                                 Medical



                                                                      Branch

 

       S/p    S/p    Disease Active                              [facility]



       reverse reverse               8-18                               ity of



       total  total                00:00:                             [place]



       shoulder shoulder               00                                 Medica

l



       arthroplas arthroplas                                                  Br

anch



       ty     ty                                                      

 

       Right arm Right arm Disease Active                              Uni

vers



       pain   pain                 8-01                               ity of



                                   00:00:                             [place]



                                                                    Medical



                                                                      Branch

 

       Essential Essential Disease Active                              Uni

vers



       hypertensi hypertensi               3-10                               it

y of



       on     on                   00:00:                             [place]



                                                                    Medical



                                                                      Branch

 

       Hyperlipem Hyperlipem Disease Active                              U

nivers



       ia     ia                   3-10                               ity of



                                   00:00:                             [place]



                                                                    Medical



                                                                      Branch

 

       Hypothyroi Hypothyroi Disease Active                              U

nivers



       dism   dism                 3-10                               ity of



                                   00:00:                             [place]



                                                                    Medical



                                                                      Branch

 

       Iron (Fe) Iron (Fe) Disease Active                              Uni

vers



       deficiency deficiency               3-10                               it

y of



       anemia anemia               00:00:                             [place]



                                                                    Medical



                                                                      Branch

 

       Vitamin D Vitamin D Disease Active                              Uni

vers



       deficiency deficiency               3-10                               it

y of



                                   00:00:                             [place]



                                   00                                 Medical



                                                                      Branch







Allergies, Adverse Reactions, Alerts







       Allergy Allergy Status Severity Reaction(s) Onset  Inactive Treating Comm

ents 

Source



       Name   Type                        Date   Date   Clinician        

 

       Morphine Drug   Active        Nausea                       Univers



              Intolera               and/or 8-18                        ity of



              nce                  Vomiting 00:00:                      [place]



                                                                    Medical



                                                                      Branch

 

       MORPHINE DRUG   Active        N/V                          Univers



              INGREDI                      8-18                        ity of



                                          00:00:                      [place]



                                          00                          Medical



                                                                      Branch

 

       PENICILL DRUG   Active        Other-Cmnt                       Univ

ers



       IN     INGREDI                      -                        ity of



                                          00:00:                      [place]



                                          00                          Medical



                                                                      Branch

 

       Penicill Propensi Active        Other - See                Pt state

s Univers



       in     ty to                comments 8-18                 "several ity of



              adverse                      00:00:               family [place]



              reaction                      00                   members Medical



              s to                                             are    Branch



              drug                                             allergic 



                                                               so i   



                                                               think i 



                                                               am"    

 

       Chocolat Propensi Active        Anxiety 2015                      Unive

rs



       e Flavor ty to                       2-18                        ity of



              adverse                      00:00:                      [place]



              reaction                      00                          Medical



              s                                                       Branch

 

       Ciproflo Propensi Active        Unknown - 2015                      Uni

vers



       xacin  ty to                See comments 2-18                        ity 

of



              adverse                      00:00:                      [place]



              reaction                      00                          Medical



              s                                                       Branch

 

       Nitrofur Propensi Active        Unknown - 2015                      Uni

vers



       antoin ty to                See comments 2-18                        ity 

of



       Monohyd/ adverse                      00:00:                      [place]



       M-Cryst reaction                      00                          Medical



              s                                                       Branch

 

       CHOCOLAT DRUG   Active        Anxiety 2015                      Univers



       E FLAVOR INGREDI                      2-18                        ity of



                                          00:00:                      [place]



                                          00                          Medical



                                                                      Branch

 

       NITROFUR DRUG   Active        Unknown-Cmnt 2015                      Un

rossy



       ANTOIN                             2-18                        ity of



       MONOHYD/                             00:00:                      [place]



       M-CRYST                             00                          Medical



                                                                      Branch







Social History







           Social Habit Start Date Stop Date  Quantity   Comments   Source

 

           Sex Assigned At                                             [facility]

y of



           Birth                                                  [facility]

 

           Alcohol intake 2020                       University

 of



                      00:00:00   00:00:00                         [facility]

 

           Alcohol Comment 2017-08-15 2017-08-15 Occasional Drinker            U

niversity of



                      00:00:00   00:00:00                         [facility]

 

           History of            1980-03-10 Cigarette Smoker            [facility] of



           tobacco use            00:00:00                         [facility]









                Smoking Status  Start Date      Stop Date       Source

 

                Never smoker                                    [facility]

 

                Former smoker   2020 00:00:00 2020 00:00:00 [facility]







Medications







       Ordered Filled Start  Stop   Current Ordering Indication Dosage Frequency

 Signature

                    Comments            Components          Source



     Medication Medication Date Date Medication? Clinician                (SIG) 

          



     Name Name                                                   

 

     levothyroxi            Yes            100ug      100 mcg,           U

nivers



     ne        4-11                               Oral,           ity of



     (SYNTHROID)      11:00:                               QAM-0600,           T

[facility]



     tablet 100      00                                 First dose           Med

ical



     mcg                                          on Sat           Branch



                                                  20 at           



                                                  0600,           



                                                  Until           



                                                  Discontinu           



                                                  ed,            



                                                  Routine           

 

     doxycycline      2020-0      Yes            100mg      Take 100           U

nivers



     monohydrate      4-11                               mg by           ity of



     100 mg      00:54:                               mouth 2           [place]



     capsule      04                                 (two)           Medical



                                                  times           Branch



                                                  daily.           

 

     ciprofloxac      2020-0      Yes            500mg      Take 500           U

nivers



     in HCl 500      4-11                               mg by           ity of



     mg tablet      00:54:                               mouth 2           [place]



               04                                 (two)           Medical



                                                  times           Branch



                                                  daily.           

 

     hydrocortis      2020-0      Yes            1{dose}      Insert 1          

 Univers



     one       4-11                               Dose into           ity of



     (PROCTOSOL      00:54:                               rectum 2           [facility]

as



     HC RECTAL)      04                                 (two)           Medical



                                                  times           Branch



                                                  daily.           

 

     ciclopirox      2020-0      Yes            1{dose}      Apply 1           U

nivers



     (PENLAC) 8      4-11                               Dose to           ity of



     % solution      00:54:                               area(s) at           T

[place]



               04                                 bedtime.           Medical



                                                  Apply to           Branch



                                                  Nails.           

 

     Cholecalcif      2020-0      Yes            1{capsu      Take 1           U

nivers



     laura,      4-11                     le}       capsule by           ity of



     Vitamin D3,      00:54:                               mouth           [place]



     (VITAMIN      04                                 daily.           Medical



     D3) 25 mcg                                                        Branch



     (1,000                                                        



     unit)                                                        



     capsule                                                        

 

     doxycycline      2020-0      Yes            100mg      Take 100           U

nivers



     monohydrate      4-11                               mg by           ity of



     100 mg      00:54:                               mouth 2           [place]



     capsule      04                                 (two)           Medical



                                                  times           Branch



                                                  daily.           

 

     ciprofloxac      2020-0      Yes            500mg      Take 500           U

nivers



     in HCl 500      4-11                               mg by           ity of



     mg tablet      00:54:                               mouth 2           [place]



               04                                 (two)           Medical



                                                  times           Branch



                                                  daily.           

 

     hydrocortis      2020-0      Yes            1{dose}      Insert 1          

 Univers



     one       4-11                               Dose into           ity of



     (PROCTOSOL      00:54:                               rectum 2           [facility]

as



     HC RECTAL)      04                                 (two)           Medical



                                                  times           Branch



                                                  daily.           

 

     ciclopirox      2020-0      Yes            1{dose}      Apply 1           U

nivers



     (PENLAC) 8      4-11                               Dose to           ity of



     % solution      00:54:                               area(s) at           T

[facility]



               04                                 bedtime.           Medical



                                                  Apply to           Branch



                                                  Nails.           

 

     Cholecalcif      2020-0      Yes            1{capsu      Take 1           U

nivers



     laura,      4-11                     le}       capsule by           ity of



     Vitamin D3,      00:54:                               mouth           [place]



     (VITAMIN      04                                 daily.           Medical



     D3) 25 mcg                                                        Branch



     (1,000                                                        



     unit)                                                        



     capsule                                                        

 

     doxycycline      2020-0      Yes            100mg      Take 100           U

nivers



     monohydrate      4-11                               mg by           ity of



     100 mg      00:54:                               mouth 2           [place]



     capsule      04                                 (two)           Medical



                                                  times           Branch



                                                  daily.           

 

     ciprofloxac      2020-0      Yes            500mg      Take 500           U

nivers



     in HCl 500      4-11                               mg by           ity of



     mg tablet      00:54:                               mouth 2           [place]



               04                                 (two)           Medical



                                                  times           Branch



                                                  daily.           

 

     hydrocortis      2020-0      Yes            1{dose}      Insert 1          

 Univers



     one       4-11                               Dose into           ity of



     (PROCTOSOL      00:54:                               rectum 2           [facility]

as



     HC RECTAL)      04                                 (two)           Medical



                                                  times           Branch



                                                  daily.           

 

     ciclopirox      2020-0      Yes            1{dose}      Apply 1           U

nivers



     (PENLAC) 8      4-11                               Dose to           ity of



     % solution      00:54:                               area(s) at           T

[facility]



               04                                 bedtime.           Medical



                                                  Apply to           Branch



                                                  Nails.           

 

     Cholecalcif      2020-0      Yes            1{capsu      Take 1           U

nivers



     laura,      4-11                     le}       capsule by           ity of



     Vitamin D3,      00:54:                               mouth           [place]



     (VITAMIN      04                                 daily.           Medical



     D3) 25 mcg                                                        Branch



     (1,000                                                        



     unit)                                                        



     capsule                                                        

 

     doxycycline      2020-0      Yes            100mg      Take 100           U

nivers



     monohydrate      4-11                               mg by           ity of



     100 mg      00:54:                               mouth 2           [place]



     capsule      04                                 (two)           Medical



                                                  times           Branch



                                                  daily.           

 

     ciprofloxac      2020-0      Yes            500mg      Take 500           U

nivers



     in HCl 500      4-11                               mg by           ity of



     mg tablet      00:54:                               mouth 2           [place]



               04                                 (two)           Medical



                                                  times           [facility]



                                                  daily.           

 

     hydrocortis      2020-0      Yes            1{dose}      Insert 1          

 [facility]



     one       4-11                               Dose into           ity of



     (PROCTOSOL      00:54:                               rectum 2           [facility]

[facility]



     HC RECTAL)      04                                 (two)           Medical



                                                  times           [facility]



                                                  daily.           

 

     ciclopirox      2020-0      Yes            1{dose}      Apply 1           U

nivers



     (PENLAC) 8      4-11                               Dose to           [facility] of



     % solution      00:54:                               area(s) at           [facility]

[place]



               04                                 bedtime.           [facility]



                                                  Apply to           [facility]



                                                  [place].           

 

     Cholecalcif      2020-0      Yes            1{capsu      Take 1           U

nivers



     laura,      4-11                     le}       capsule by           ity of



     Vitamin D3,      00:54:                               mouth           [place]



     (VITAMIN      04                                 daily.           Medical



     D3) 25 mcg                                                        [facility]



     (1,000                                                        



     unit)                                                        



     capsule                                                        

 

     levothyroxi      2020-0 2020- No        [id number] 100ug      Take 1         

  [facility]



     ne 100 mcg                                tablet by           ity

 of



     tablet      00:00: 04:59                          mouth           [place]



               00   :00                           every           Medical



                                                  morning           [facility]



                                                  for 30           



                                                  days.           

 

     levothyroxi      2020-0 2020- No        [id number] 100ug      Take 1         

  [facility]



     ne 100 mcg      12                          tablet by           ity

 of



     tablet      00:00: 04:59                          mouth           [place]



               00   :00                           every           Medical



                                                  morning           [facility]



                                                  for 30           



                                                  days.           

 

     mirtazapine      2020-0 2020- No             7.5mg      Take 7.5           

[facility]



     7.5 mg      4-10 04-10                          mg by           ity of



     tablet      22:05: 00:00                          mouth at           [place]



               12   :00                           bedtime.           Medical



                                                                 [facility]

 

     haloperidol      -0 2020- No             5mg       5 mg,           Un[facility]

ers



     lactate      4-10 04-10                          Intramuscu           ity o

f



     (HALDOL)      22:00: 21:03                          lar, ONCE,           [facility]

[place]



     injection 5      00   :00                           1 dose,           Medic

al



     mg                                           Fri            [facility]



                                                  4/10/20 at           



                                                  1700,           



                                                  Routine           

 

     LORazepam      -0 - No             .5mg      0.5 mg,           Univ

ers



     (ATIVAN)      4-10 04-10                          Slow IV           ity of



     injection      21:45: 20:37                          Push,           [place]



     0.5 mg      00   :00                           ONCE, 1           Medical



                                                  dose, Fri           [facility]



                                                  4/10/20 at           



                                                  1645,           



                                                  Routine           

 

     vitamin      2020-0      Yes            1000ug      1,000 mcg,           Un

[facility]



     B-12      4-10                               Oral,           ity of



     (CYANOCOBAL      14:00:                               DAILY,           [place]

[facility]



     KWONG)      00                                 First dose           Medical



     tablet                                         on Fri           [facility]



     1,000 mcg                                         4/10/20 at           



                                                  0900,           



                                                  Until           



                                                  Discontinu           



                                                  ed,            



                                                  Routine           

 

     metoprolol      -0      Yes            50mg      50 mg,           Unive

rs



     succinate      -10                               Oral,           ity of



     XL (TOPROL      14:00:                               DAILY,           [place]



     XL) tablet      00                                 First dose           Med

ical



     50 mg                                         on Fri           [facility]



                                                  4/10/20 at           



                                                  0900,           



                                                  Until           



                                                  Discontinu           



                                                  ed,            



                                                  Routine           

 

     levothyroxi      -0 2020- No             88ug      88 mcg,           Un

[facility]



     ne        -10 04-10                          Oral,           ity of



     (SYNTHROID)      11:00: 12:35                          QAM-0600,           

[place]



     tablet 88      00   :17                           First dose           Medi

[facility]



     mcg                                          on Fri           [facility]



                                                  4/10/20 at           



                                                  0600,           



                                                  Until           



                                                  Discontinu           



                                                  ed,            



                                                  Routine           

 

     rivastigmin      2020-0      Yes            3mg       3 mg,           Unive

rs



     e tartrate      4-10                               Oral,           ity of



     (EXELON)      06:15:                               QAM+PM,           [place]



     capsule 3      00                                 First dose           Medi

[facility]



     mg                                           on Fri           [facility]



                                                  4/10/20 at           



                                                  0115,           



                                                  Until           



                                                  Discontinu           



                                                  ed<br>Facu           



                                                  lty member           



                                                  approving           



                                                  Restricted           



                                                  medication           



                                                  : [facility]           

 

     mirtazapine      2020-0      Yes            15mg      15 mg,           Univ

ers



     (REMERON)      4-10                               Oral, QHS,           ity 

of



     tablet 15      06:15:                               First dose           [facility]

[place]



     mg        00                                 on Fri           Medical



                                                  4/10/20 at           [facility]



                                                  0115,           



                                                  Until           



                                                  Discontinu           



                                                  ed,            



                                                  Routine<br           



                                                  >Reason           



                                                  for            



                                                  Non-Formul           



                                                  shukri Use:           



                                                  SPECIFIC           



                                                  INDICATION           



                                                  FOR            



                                                  NONFORMULA           



                                                  RY             



                                                  PRODUCT<br           



                                                  >Faculty           



                                                  member           



                                                  approving           



                                                  Non-formul           



                                                  shukri            



                                                  medication           



                                                  : MEG[facility]           

 

     doxycycline      2020-0      Yes            100mg      100 mg,           Un

[facility]



     hyclate      4-10                               Oral,           ity of



     (Vibramycin      06:15:                               Q12HA2,           [facility]

[facility]



     ) capsule      00                                 First dose           Medi

[facility]



     100 mg                                         on Fri           [facility]



                                                  4/10/20 at           



                                                  0115,           



                                                  Until           



                                                  Discontinu           



                                                  ed<br>Reas           



                                                  on for           



                                                  Anti-Infec           



                                                  tive:           



                                                  Documented           



                                                  Infection<           



                                                  br>Documen           



                                                  brody            



                                                  Infection           



                                                  Site: Skin           



                                                  / Soft           



                                                  Tissue<br>           



                                                  Duration           



                                                  of             



                                                  Therapy: 7           



                                                  days           

 

     ciprofloxac      2020-0      Yes            500mg      500 mg,           Un

[facility]



     in HCl      4-10                               Oral, BID,           ity [facility]



     (CIPRO)      06:15:                               First dose           [place]

[facility]



     tablet 500      00                                 on Fri           Medical



     mg                                           4/10/20 at           [facility]



                                                  0115,           



                                                  Until           



                                                  Discontinu           



                                                  ed,            



                                                  ASAP<br>Re           



                                                  ason for           



                                                  Anti-Infec           



                                                  tive:           



                                                  Documented           



                                                  Infection<           



                                                  br>Documen           



                                                  brody            



                                                  Infection           



                                                  Site: Skin           



                                                  / Soft           



                                                  Tissue<br>           



                                                  Duration           



                                                  of             



                                                  Therapy: 7           



                                                  days           

 

     magnesium      2020-0      Yes            400mg      400 mg,           Univ

ers



     oxide      4-10                               Oral, BID,           ity of



     (MAG-OX      06:15:                               First dose           [place]

[facility]



     400) tablet      00                                 on Fri           Medica

l



     400 mg                                         4/10/20 at           [facility]



                                                  0115,           



                                                  Until           



                                                  Discontinu           



                                                  ed,            



                                                  Routine           

 

     KCL       2020-0      Yes            20meq      20 mEq,           [facility]



     (KLOR-CON      4-10                               Oral,           ity of



     M20) tablet      06:15:                               DAILY,           [place]

[facility]



     20 mEq      00                                 First dose           Medical



                                                  on Fri           [facility]



                                                  4/10/20 at           



                                                  0115,           



                                                  Until           



                                                  Discontinu           



                                                  ed,            



                                                  Routine           

 

     lactobacill      -0      Yes            1{tbl}      1 tablet,          

 [facility]



     us        4-10                               Oral, QID,           ity of



     acidophilus      06:15:                               First dose           

[place]



     (ACIDOPHILL      00                                 on Fri           Medica

l



     US) 25                                         4/10/20 at           [facility]



     million                                         0115,           



     cell -100                                         Until           



     mg captab 1                                         Discontinu           



     tablet                                         ed,            



                                                  Routine           

 

     Cholecalcif      0 2020- No             1000U      Take 1,000         

  [facility]



     laura,      4-10 04-09                          Units by           ity of



     Vitamin D3,      06:11: 00:00                          mouth           [place]

[facility]



     (VITAMIN      12   :00                           every           Medical



     D3) 1,000                                         morning.           [facility]



     unit                                                        



     capsule                                                        

 

     traMADol      0      Yes            50mg      50 mg,           [facility]



     (ULTRAM)      4-10                               Oral,           ity of



     tablet 50      06:10:                               Q6HPRN,           [place]



     mg        52                                 Starting           Medical



                                                  Fri            [facility]



                                                  4/10/20 at           



                                                  0110,           



                                                  Until           



                                                  Discontinu           



                                                  ed,            



                                                  Routine,           



                                                  Pain           



                                                  (scale           



                                                  4-6)           

 

     mirtazapine       2020- No        [id number] 15mg      Take 1       

    [facility]



     15 mg      4-10 05-11           03             tablet by           ity of



     tablet      00:00: 04:59                          mouth at           [place]



               00   :00                           bedtime           Medical



                                                  for 30           [facility]



                                                  days.           

 

     mirtazapine       2020- No        [id number] 15mg      Take 1       

    [facility]



     15 mg      4-10 05-11           03             tablet by           ity of



     tablet      00:00: 04:59                          mouth at           [place]



               00   :00                           bedtime           Medical



                                                  for 30           [facility]



                                                  days.           

 

     lactobacill       2020- No        [id number] 1{tbl}      Take 1       

    [facility]



     us        4-10 04-18                          tablet by           ity of



     acidophilus      00:00: 04:59                          mouth 4           [facility]

[facility]



     25 million      00   :00                           (four)           Medical



     cell -100                                         times           [facility]



     mg captab                                         daily for           



                                                  7 days.           

 

     lactobacill       2020- No        [id number] 1{tbl}      Take 1       

    [facility]



     us        4-10 04-18                          tablet by           ity of



     acidophilus      00:00: 04:59                          mouth 4           [facility]

[facility]



     25 million      00   :00                           (four)           Medical



     cell -100                                         times           [facility]



     mg captab                                         daily for           



                                                  7 days.           

 

     metoprolol      2020-0      Yes       [id number] 50mg      Take 1           

[facility]



     succinate      3-24                               tablet by           ity o

f



     XL 50 mg 24      00:00:                               mouth           [place]



     hr tablet      00                                 daily.           Medical



                                                                 [facility]anch

 

     metoprolol      2020-0      Yes       [id number] 50mg      Take 1           

[facility]



     succinate      3-24                               tablet by           ity o

f



     XL 50 mg 24      00:00:                               mouth           [place]



     hr tablet      00                                 daily.           Medical



                                                                 [facility]

 

     metoprolol      2020-0      Yes       [id number] 50mg      Take 1           

[facility]



     succinate      3-24                               tablet by           ity o

f



     XL 50 mg 24      00:00:                               mouth           [place]



     hr tablet      00                                 daily.           Medical



                                                                 [facility]

 

     metoprolol      2020-0      Yes       [id number] 50mg      Take 1           

[facility]



     succinate      3-24                               tablet by           ity o

f



     XL 50 mg 24      00:00:                               mouth           [place]



     hr tablet      00                                 daily.           Medical



                                                                 [facility]

 

     metoprolol      2020-0      Yes       [id number] 50mg      Take 1           

[facility]



     succinate      3-24                               tablet by           ity o

f



     XL 50 mg 24      00:00:                               mouth           [place]



     hr tablet      00                                 daily.           Medical



                                                                 [facility]

 

     mirtazapine      2020-0      Yes            7.5mg      Take 1           Uni

vers



     7.5 mg      3-23                               tablet by           ity of



     tablet      00:00:                               mouth at           [place]



               00                                 bedtime.           Medical



                                                                 Branch

 

     mirtazapine      2020-0      Yes            7.5mg      Take 1           Uni

vers



     7.5 mg      3-23                               tablet by           ity of



     tablet      00:00:                               mouth at           [place]



               00                                 bedtime.           Medical



                                                                 Branch

 

     mirtazapine      2020-0 2020- No             7.5mg      Take 1           Un

[facility]



     7.5 mg      3-23 04-09                          tablet by           ity of



     tablet      00:00: 00:00                          mouth at           [place]



               00   :00                           bedtime.           Medical



                                                                 [facility]

 

     metoprolol      2020-0      Yes       [id number] 50mg      Take 1           

[facility]



     succinate      2-24                               tablet by           ity o

f



     XL 50 mg 24      00:00:                               mouth           [place]



     hr tablet      00                                 daily.           Medical



                                                                 [facility]

 

     metoprolol      2020-0      Yes       [id number] 50mg      Take 1           

[facility]



     succinate      2-24                               tablet by           ity o

f



     XL 50 mg 24      00:00:                               mouth           [place]



     hr tablet      00                                 daily.           Medical



                                                                 [facility]

 

     metoprolol      2020-0      Yes       [id number] 50mg      Take 1           

[facility]



     succinate      2-24                               tablet by           ity o

f



     XL 50 mg 24      00:00:                               mouth           [place]



     hr tablet      00                                 daily.           Medical



                                                                 [facility]

 

     metoprolol      2020-0      Yes       [id number] 50mg      Take 1           

[facility]



     succinate      2-24                               tablet by           ity o

f



     XL 50 mg 24      00:00:                               mouth           [place]



     hr tablet      00                                 daily.           Medical



                                                                 Br[facility]

 

     metoprolol      2020-0      Yes       [id number] 50mg      Take 1           

[facility]



     succinate      2-24                               tablet by           ity o

f



     XL 50 mg 24      00:00:                               mouth           [place]



     hr tablet      00                                 daily.           Medical



                                                                 Branch

 

     metoprolol      2020-0 2020- No        [id number] 50mg      Take 1          

 [facility]



     succinate      2-24 03-24                          tablet by           ity 

of



     XL 50 mg 24      00:00: 00:00                          mouth           [place]

[facility]



     hr tablet      00   :00                           daily.           Medical



                                                                 [facility]

 

     Cholecalcif      2020-0      Yes            1000U      Take 1,000          

 [facility]



     laura,      2-23                               Units by           [facility]



     Vitamin D3,      19:43:                               mouth           [place]



     (VITAMIN      56                                 every           Medical



     D3) 1,000                                         morning.           [facility]



     unit                                                        



     capsule                                                        

 

     Cholecalcif      2020-0      Yes            1000U      Take 1,000          

 [facility]



     laura,      2-23                               Units by           ity of



     Vitamin D3,      19:43:                               mouth           [place]



     (VITAMIN      56                                 every           Medical



     D3) 1,000                                         morning.           [facility]



     unit                                                        



     capsule                                                        

 

     Cholecalcif      2020-0      Yes            1000U      Take 1,000          

 [facility]



     laura,      2-23                               Units by           ity of



     Vitamin D3,      19:43:                               mouth           [place]



     (VITAMIN      56                                 every           Medical



     D3) 1,000                                         morning.           [facility]



     unit                                                        



     capsule                                                        

 

     Cholecalcif      2020-0      Yes            1000U      Take 1,000          

 [facility]



     laura,      2-23                               Units by           ity of



     Vitamin D3,      19:43:                               mouth           [place]



     (VITAMIN      56                                 every           Medical



     D3) 1,000                                         morning.           [facility]



     unit                                                        



     capsule                                                        

 

     Cholecalcif      2020-0      Yes            1000U      Take 1,000          

 [facility]



     laura,      2-23                               Units by           ity of



     Vitamin D3,      19:43:                               mouth           [place]



     (VITAMIN      56                                 every           Medical



     D3) 1,000                                         morning.           [facility]



     unit                                                        



     capsule                                                        

 

     Cholecalcif      2020-0      Yes            1000U      Take 1,000          

 [facility]



     laura,      2-23                               Units by           ity of



     Vitamin D3,      19:43:                               mouth           [place]



     (VITAMIN      56                                 every           Medical



     D3) 1,000                                         morning.           [facility]



     unit                                                        



     capsule                                                        

 

     Cholecalcif      2020-0      Yes            1000U      Take 1,000          

 [facility]



     laura,      2-23                               Units by           ity of



     Vitamin D3,      19:43:                               mouth           [place]



     (VITAMIN      56                                 every           Medical



     D3) 1,000                                         morning.           [facility]



     unit                                                        



     capsule                                                        

 

     doxycycline      2020-0 2020- No             100mg      Take 100           

[facility]



     monohydrate      2-23 02-23                          mg by           ity of



     100 mg      18:38: 00:00                          mouth 2           [place]



     capsule      16   :00                           (two)           Medical



                                                  times           [facility]



                                                  daily.           

 

     ciprofloxac      2020-0 2020- No             500mg      Take 500           

[facility]



     in HCl 500      2-23 02-23                          mg by           ity of



     mg tablet      18:38: 00:00                          mouth           [place]



               16   :00                           every 12           Medical



                                                  (twelve)           [facility]



                                                  hours.           

 

     haloperidol      2020-0      Yes            2mg       2 mg, Slow           

[facility]



     lactate      2-23                               IV Push,           [facility]



     (HALDOL)      05:40:                               PRN, 2           [place]



     injection 2      29                                 doses,           Med[facility]

l



     mg                                           Starting           [facility]



                                                  Sat            



                                                  20 at           



                                                  2340,           



                                                  Until           



                                                  Discontinu           



                                                  ed,            



                                                  Routine,           



                                                  Psychosis           

 

     KCL       2020-0 2020- No             40meq      40 mEq,           [facility]



     (KLOR-CON      2-23 02-23                          Oral, BID,           ity

 of



     M20) tablet      02:00: 01:39                          1 dose,           [facility]

[place]



     40 mEq      00   :00                           First dose           [facility]



                                                  on Sat           [facility]



                                                  20 at           



                                                  2000,           



                                                  Routine           

 

     vitamin      2020-0      Yes       [id number] 1000ug      Take 1           U

nivers



     B-12 1,000      2-23                               tablet by           ity 

of



     mcg tablet      00:00:                               mouth           [place]



               00                                 daily.           Medical



                                                                 [facility]

 

     vitamin      2020-0      Yes       [id number] 1000ug      Take 1           U

nivers



     B-12 1,000      2-23                               tablet by           ity 

of



     mcg tablet      00:00:                               mouth           [place]



               00                                 daily.           Medical



                                                                 Branch

 

     vitamin      2020-0      Yes       [id number] 1000ug      Take 1           U

nivers



     B-12 1,000      2-23                               tablet by           ity 

of



     mcg tablet      00:00:                               mouth           [place]



               00                                 daily.           Medical



                                                                 Br[facility]

 

     vitamin      2020-0      Yes       [id number] 1000ug      Take 1           U

nivers



     B-12 1,000      2-23                               tablet by           ity 

of



     mcg tablet      00:00:                               mouth           [place]



               00                                 daily.           Medical



                                                                 [facility]

 

     vitamin      2020-0      Yes       [id number] 1000ug      Take 1           U

nivers



     B-12 1,000      2-23                               tablet by           ity 

of



     mcg tablet      00:00:                               mouth           [place]



               00                                 daily.           Medical



                                                                 Br[facility]

 

     vitamin      2020-0      Yes       [id number] 1000ug      Take 1           U

nivers



     B-12 1,000      2-23                               tablet by           ity 

of



     mcg tablet      00:00:                               mouth           [place]



               00                                 daily.           Medical



                                                                 [facility]

 

     vitamin      2020-0      Yes       [id number] 1000ug      Take 1           U

nivers



     B-12 1,000      2-23                               tablet by           ity 

of



     mcg tablet      00:00:                               mouth           [place]



               00                                 daily.           Medical



                                                                 Branch

 

     vitamin      2020-0      Yes       [id number] 1000ug      Take 1           U

nivers



     B-12 1,000      2-23                               tablet by           ity 

of



     mcg tablet      00:00:                               mouth           [place]



               00                                 daily.           Medical



                                                                 Branch

 

     vitamin      2020-0      Yes       [id number] 1000ug      Take 1           U

nivers



     B-12 1,000      2-23                               tablet by           ity 

of



     mcg tablet      00:00:                               mouth           [place]



               00                                 daily.           Medical



                                                                 Branch

 

     vitamin      2020-0      Yes       [id number] 1000ug      Take 1           U

nivers



     B-12 1,000      2-23                               tablet by           ity 

of



     mcg tablet      00:00:                               mouth           [place]



               00                                 daily.           Medical



                                                                 Branch

 

     vitamin      2020-0      Yes       [id number] 1000ug      Take 1           U

nivers



     B-12 1,000      2-23                               tablet by           ity 

of



     mcg tablet      00:00:                               mouth           [place]



               00                                 daily.           Medical



                                                                 Branch

 

     iron      2020-0      Yes            200mg      200 mg, IV           [facility]

s



     sucrose      -                               Infusion,           ity [facility]



     (VENOFER)      17:30:                               Q24H,           [place]



     200 mg in      00                                 First dose           Medi

[facility]



     NaCl 0.9%                                         on Sat           [facility]



     (NS) 100 mL                                         20 at           



     infusion                                         1130           

 

     metoprolol      2020-0      Yes            25mg      25 mg,           Un[facility]

rs



     succinate                                     Oral,           ity of



     XL (TOPROL      17:00:                               DAILY,           [place]



     XL) tablet      00                                 First dose           Med

ical



     25 mg                                         on Sat           [facility]



                                                  20 at           



                                                  1100,           



                                                  Until           



                                                  Discontinu           



                                                  ed,            



                                                  Routine           

 

     magnesium      2020-0      Yes            400mg      400 mg,           [facility]

ers



     oxide      -                               Oral, BID,           [facility]



     (MAG-OX      17:00:                               First dose           [place]

[facility]



     400) tablet      00                                 on Sat           [facility]

l



     400 mg                                         20 at           [facility]



                                                  1100,           



                                                  Until           



                                                  Discontinu           



                                                  ed,            



                                                  Routine           

 

     cyanocobala      2020-0      Yes            1000ug      1,000 mcg,         

  [facility]



     min                                      Subcutaneo           [facility] of



     (VITAMIN      15:45:                               us, Q24H,           [place]

[facility]



     B12)      00                                 First dose           [facility]



     injection                                         on Sat           [facility]



     1,000 mcg                                         20 at           



                                                  0945,           



                                                  Until           



                                                  Discontinu           



                                                  ed,            



                                                  Routine           

 

     KCL       2020-0 2020- No             40meq      40 mEq,           [facility]



     (KLOR-CON      -                          Oral,           ity of



     M20) tablet      15:45: 16:29                          ONCE, 1           [facility]

[facility]



     40 mEq      00   :00                           dose, Sat           Medical



                                                  20 at           [facility]



                                                  0945,           



                                                  Routine           

 

     rivaroxaban      2020-0      Yes            15mg      15 mg,           [facility]

ers



     (XARELTO)      2-                               Oral,           ity of



     tablet 15      15:00:                               DAILY,           [place]



     mg        00                                 First dose           Medical



                                                  on Sat           [facility]



                                                  20 at           



                                                  0900,           



                                                  Until           



                                                  Discontinu           



                                                  ed,            



                                                  Routine           

 

     losartan      -2020- No             50mg      50 mg,           Univer

s



     (COZAAR)                                Oral,           ity of



     tablet 50      15:00: 16:35                          DAILY,           [place]



     mg        00   :27                           First dose           Medical



                                                  on Sat           [facility]



                                                  20 at           



                                                  0900,           



                                                  Until           



                                                  Discontinu           



                                                  ed,            



                                                  Routine           

 

     levothyroxi            Yes            88ug      88 mcg,           Uni

vers



     ne                                       Oral,           ity of



     (SYNTHROID)      12:00:                               QAM-0600,           [facility]

[place]



     tablet 88      00                                 First dose           [facility]

[place]



     mcg                                          on Sat           [facility]



                                                  20 at           



                                                  0600,           



                                                  Until           



                                                  Discontinu           



                                                  ed,            



                                                  Routine           

 

     NaCl 0.9%      2020- No             1000mL      at 75           Univ

ers



     (NS) IV                                mL/hr, IV           ity of



     infusion      07:15: 07:04                          Infusion,           [facility]

[facility]



     1,000 mL      00   :00                           ONCE, 1           Medical



                                                  dose, Sat           [facility]



                                                  20 at           



                                                  0115,           



                                                  Routine           

 

     mupirocin            Yes                                     [facility]



     (BACTROBAN                                                    ity of



     OINT) 2 %      02:00:                                              [place]



     skin      00                                                Medical



     ointment                                                        [facility]

 

     doxycycline      2020- No             100mg      100 mg,           U

nivers



     hyclate                                Oral,           ity of



     (Vibramycin      00:00: 01:20                          Q12HA2, 1           

[place]



     ) capsule      00   :00                           dose,           Medical



     100 mg                                         First dose           [facility]



                                                  on 20 at           



                                                  1800<br>Re           



                                                  ason for           



                                                  Anti-Infec           



                                                  tive:           



                                                  Documented           



                                                  Infection<           



                                                  br>Documen           



                                                  brody            



                                                  Infection           



                                                  Site:           



                                                  Wound<br>D           



                                                  uration of           



                                                  Therapy: 7           



                                                  days           

 

     ciprofloxac      2020- No             500mg      500 mg,           U

nivers



     in HCl                                Oral,           ity of



     (CIPRO)      23:30: 00:00                          Q12H, 1           [place]



     tablet 500      00   :00                           dose,           Medical



     mg                                           First dose           [facility]



                                                  on 20 at           



                                                  1730,           



                                                  ASAP<br>Re           



                                                  ason for           



                                                  Anti-Infec           



                                                  tive:           



                                                  Empiric           



                                                  Therapy           



                                                  for            



                                                  Suspected           



                                                  Infection<           



                                                  br>Empiric           



                                                  Therapy           



                                                  Site:           



                                                  Wound<br>D           



                                                  uration of           



                                                  therapy:           



                                                  72 hours           

 

     carvediloL      -0 2020- No             37.5mg      37.5 mg,           

[facility]



     (COREG)      -                          Oral, BID           ity of



     tablet 37.5      23:00: 06:14                          MEALS,           [facility]

[facility]



     mg        00   :34                           First dose           Medical



                                                  on Fri           [facility]



                                                  20 at           



                                                  1700,           



                                                  Until           



                                                  Discontinu           



                                                  ed,            



                                                  Routine           

 

     acetaminoph      0      Yes            650mg      650 mg,           [facility]

[facility]



     en                                       Oral,           ity of



     (TYLENOL)      22:55:                               Q6HPRN,           [place]



     tablet 650      48                                 Starting           Medic

al



     mg                                           Fri            [facility]



                                                  20 at           



                                                  1655,           



                                                  Until           



                                                  Discontinu           



                                                  ed,            



                                                  Routine,           



                                                  Pain           



                                                  (scale           



                                                  1-3)           

 

     NaCl 0.9%       2020- No             500mL      at 999           [facility]

ers



     (NS) bolus                                mL/hr, 500           it

y of



     infusion      18:45: 18:14                          mL, IV           [place]



     500 mL      00   :00                           Infusion,           Medical



                                                  ONCE, 1           [facility]



                                                  dose, 20 at           



                                                  1245, STAT           

 

     rivastigmin      0      Yes            1{patch      Apply 1           

[facility]



     e 4.6 mg/24      2-06                     }         Patch to           ity 

of



     hr patch      00:00:                               skin           [place]



               00                                 daily.           [facility]



                                                                 Branch

 

     rivastigmin      0      Yes            1{patch      Apply 1           

[facility]



     e 4.6 mg/24      2-06                     }         Patch to           ity 

of



     hr patch      00:00:                               skin           [place]



               00                                 daily.           [facility]

 

     rivastigmin      -0      Yes            1{patch      Apply 1           

[facility]



     e 4.6 mg/24      2-06                     }         Patch to           ity 

of



     hr patch      00:00:                               skin           [place]



               00                                 daily.           [facility]

 

     rivastigmin      -0      Yes            1{patch      Apply 1           

[facility]



     e 4.6 mg/24      2-06                     }         Patch to           ity 

of



     hr patch      00:00:                               skin           [place]



               00                                 daily.           [facility]

 

     rivastigmin      -0      Yes       [id number] 1{patch      Apply 1       

    [facility]



     e 4.6 mg/24      2-03                     }         Patch to           ity 

of



     hr patch      00:00:                               skin           [place]



               00                                 daily.           [facility]

 

     rivastigmin      2020-0      Yes       [id number] 1{patch      Apply 1       

    [facility]



     e 4.6 mg/24      2-03                     }         Patch to           ity 

of



     hr patch      00:00:                               skin           [place]



               00                                 daily.           [facility]

 

     rivastigmin      -0      Yes       [id number] 1{patch      Apply 1       

    [facility]



     e 4.6 mg/24      2-03                     }         Patch to           ity 

of



     hr patch      00:00:                               skin           [place]



               00                                 daily.           Medical



                                                                 Branch

 

     rivastigmin      2020-0      Yes       [id number] 1{patch      Apply 1       

    [facility]



     e 4.6 mg/24      2-03                     }         Patch to           ity 

of



     hr patch      00:00:                               skin           [place]



               00                                 daily.           Medical



                                                                 Branch

 

     rivastigmin      2020-0      Yes       [id number] 1{patch      Apply 1       

    [facility]



     e 4.6 mg/24      2-03                     }         Patch to           ity 

of



     hr patch      00:00:                               skin           [place]



               00                                 daily.           [facility]



                                                                 Branch

 

     rivastigmin      -0      Yes       [id number] 1{patch      Apply 1       

    [facility]



     e 4.6 mg/24      2-03                     }         Patch to           ity 

of



     hr patch      00:00:                               skin           [place]



               00                                 daily.           Medical



                                                                 Branch

 

     rivastigmin      -0      Yes       [id number] 1{patch      Apply 1       

    [facility]



     e 4.6 mg/24      2-03                     }         Patch to           ity 

of



     hr patch      00:00:                               skin           [place]



               00                                 daily.           [facility]



                                                                 Branch

 

     rivastigmin      -0      Yes       [id number] 1{patch      Apply 1       

    [facility]



     e 4.6 mg/24      2-03                     }         Patch to           ity 

of



     hr patch      00:00:                               skin           [place]



               00                                 daily.           [facility]



                                                                 Branch

 

     rivastigmin      -0      Yes       [id number] 1{patch      Apply 1       

    [facility]



     e 4.6 mg/24      2-03                     }         Patch to           ity 

of



     hr patch      00:00:                               skin           [place]



               00                                 daily.           [facility]



                                                                 Branch

 

     rivastigmin      2020-0      Yes       [id number] 1{patch      Apply 1       

    [facility]



     e 4.6 mg/24      2-03                     }         Patch to           ity 

of



     hr patch      00:00:                               skin           [place]



               00                                 daily.           Medical



                                                                 Branch

 

     rivastigmin      -0      Yes       [id number] 1{patch      Apply 1       

    [facility]



     e 4.6 mg/24      2-03                     }         Patch to           ity 

of



     hr patch      00:00:                               skin           [place]



               00                                 daily.           Medical



                                                                 Branch

 

     rivastigmin      2020-0 2020- No        [id number] 1{patch      Apply 1      

     [facility]



     e 4.6 mg/24      2-03 04-09                }         Patch to           ity

 of



     hr patch      00:00: 00:00                          skin           [place]



               00   :00                           daily.           Medical



                                                                 Branch

 

     Ferrous      2019-      Yes       [id number] 1{tbl}      Take 1           Un

[facility]y



     Fumarate-Fo      1-13                               tablet by           ity

 of



     lic Acid      00:00:                               mouth           [place]



     (HEMATINIC/      00                                 daily.           [facility]

l



     FOLIC ACID)                                                        Branch



     324 mg (106                                                        



     mg iron)-1                                                        



     mg Tab                                                        

 

     Ferrous      2019      Yes       [id number] 1{tbl}      Take 1           Un

[facility]y



     Fumarate-Fo      1-13                               tablet by           ity

 of



     lic Acid      00:00:                               mouth           [place]



     (HEMATINIC/      00                                 daily.           [facility]

l



     FOLIC ACID)                                                        Branch



     324 mg (106                                                        



     mg iron)-1                                                        



     mg Tab                                                        

 

     Ferrous      2019      Yes       [id number] 1{tbl}      Take 1           Un

rossy



     Fumarate-Fo      1-13                               tablet by           ity

 of



     lic Acid      00:00:                               mouth           [place]



     (HEMATINIC/      00                                 daily.           [facility]

l



     FOLIC ACID)                                                        Branch



     324 mg (106                                                        



     mg iron)-1                                                        



     mg Tab                                                        

 

     Ferrous      2019      Yes       [id number] 1{tbl}      Take 1           Un

[facility]



     Fumarate-Fo      1-13                               tablet by           ity

 of



     lic Acid      00:00:                               mouth           [place]



     (HEMATINIC/                                       daily.           [facility]

l



     FOLIC ACID)                                                        Branch



     324 mg (106                                                        



     mg iron)-1                                                        



     mg Tab                                                        

 

     Ferrous      2019      Yes       [id number] 1{tbl}      Take 1           Un

[facility]



     Fumarate-Fo      1-13                               tablet by           ity

 of



     lic Acid      00:00:                               mouth           [place]



     (HEMATINIC/      00                                 daily.           [facility]

l



     FOLIC ACID)                                                        Branch



     324 mg (106                                                        



     mg iron)-1                                                        



     mg Tab                                                        

 

     Ferrous      2019      Yes       [id number] 1{tbl}      Take 1           Un

rossy



     Fumarate-Fo      1-13                               tablet by           ity

 of



     lic Acid      00:00:                               mouth           [place]



     (HEMATINIC/      00                                 daily.           [facility]

l



     FOLIC ACID)                                                        Branch



     324 mg (106                                                        



     mg iron)-1                                                        



     mg Tab                                                        

 

     Ferrous      2019      Yes       [id number] 1{tbl}      Take 1           Un

rossy



     Fumarate-Fo      1-13                               tablet by           ity

 of



     lic Acid      00:00:                               mouth           [place]



     (HEMATINIC/      00                                 daily.           [facility]

l



     FOLIC ACID)                                                        Branch



     324 mg (106                                                        



     mg iron)-1                                                        



     mg Tab                                                        

 

     Ferrous      2019      Yes       [id number] 1{tbl}      Take 1           Un

rossy



     Fumarate-Fo      1-13                               tablet by           ity

 of



     lic Acid      00:00:                               mouth           [place]



     (HEMATINIC/      00                                 daily.           [facility]

l



     FOLIC ACID)                                                        Branch



     324 mg (106                                                        



     mg iron)-1                                                        



     mg Tab                                                        

 

     Ferrous      2019      Yes       [id number] 1{tbl}      Take 1           Un

rossy



     Fumarate-Fo      1-13                               tablet by           ity

 of



     lic Acid      00:00:                               mouth           [place]



     (HEMATINIC/      00                                 daily.           [facility]

l



     FOLIC ACID)                                                        Branch



     324 mg (106                                                        



     mg iron)-1                                                        



     mg Tab                                                        

 

     Ferrous      2019      Yes       [id number] 1{tbl}      Take 1           Un

rossy



     Fumarate-Fo      1-13                               tablet by           ity

 of



     lic Acid      00:00:                               mouth           [place]



     (HEMATINIC/      00                                 daily.           [facility]

l



     FOLIC ACID)                                                        Branch



     324 mg (106                                                        



     mg iron)-1                                                        



     mg Tab                                                        

 

     Ferrous      2019      Yes       [id number] 1{tbl}      Take 1           Un

rossy



     Fumarate-Fo      1-13                               tablet by           ity

 of



     lic Acid      00:00:                               mouth           [place]



     (HEMATINIC/      00                                 daily.           [facility]

l



     FOLIC ACID)                                                        Branch



     324 mg (106                                                        



     mg iron)-1                                                        



     mg Tab                                                        

 

     Ferrous      2019      Yes       [id number] 1{tbl}      Take 1           Un

rossy



     Fumarate-Fo      1-13                               tablet by           ity

 of



     lic Acid      00:00:                               mouth           [place]



     (HEMATINIC/      00                                 daily.           [facility]

l



     FOLIC ACID)                                                        Branch



     324 mg (106                                                        



     mg iron)-1                                                        



     mg Tab                                                        

 

     Ferrous      2019      Yes       [id number] 1{tbl}      Take 1           Un

rossy



     Fumarate-Fo      1-13                               tablet by           ity

 of



     lic Acid      00:00:                               mouth           [place]



     (HEMATINIC/      00                                 daily.           [facility]

l



     FOLIC ACID)                                                        Branch



     324 mg (106                                                        



     mg iron)-1                                                        



     mg Tab                                                        

 

     Ferrous      2019      Yes       [id number] 1{tbl}      Take 1           Un

rossy



     Fumarate-Fo      1-13                               tablet by           ity

 of



     lic Acid      00:00:                               mouth           [place]



     (HEMATINIC/      00                                 daily.           [facility]

l



     FOLIC ACID)                                                        Branch



     324 mg (106                                                        



     mg iron)-1                                                        



     mg Tab                                                        

 

     Ferrous      2019      Yes       [id number] 1{tbl}      Take 1           Un

rossy



     Fumarate-Fo      1-13                               tablet by           ity

 of



     lic Acid      00:00:                               mouth           [place]



     (HEMATINIC/      00                                 daily.           [facility]

l



     FOLIC ACID)                                                        Branch



     324 mg (106                                                        



     mg iron)-1                                                        



     mg Tab                                                        

 

     levothyroxi      2019      Yes       [id number]           TAKE 1           

[facility]



     ne 88 mcg      0-29                               TABLET BY           ity o

f



     tablet      00:00:                               MOUTH ONCE           [place]



               00                                 DAILY IN           Medical



                                                  THE            Branch



                                                  MORNING           

 

     levothyroxi      2019      Yes       [id number]           TAKE 1           

[facility]



     ne 88 mcg      0-29                               TABLET BY           ity o

f



     tablet      00:00:                               MOUTH ONCE           [place]



               00                                 DAILY IN           Medical



                                                  THE            Branch



                                                  MORNING           

 

     levothyroxi      2019      Yes       [id number]           TAKE 1           

[facility]



     ne 88 mcg      0-29                               TABLET BY           ity o

f



     tablet      00:00:                               MOUTH ONCE           [place]



               00                                 DAILY IN           [facility]           

 

     levothyroxi      2019      Yes       [id number]           TAKE 1           

[facility]



     ne 88 mcg      0-29                               TABLET BY           ity o

f



     tablet      00:00:                               MOUTH ONCE           [place]



               00                                 DAILY IN           [facility]           

 

     levothyroxi      2019      Yes       [id number]           TAKE 1           

[facility]



     ne 88 mcg      0-29                               TABLET BY           ity o

f



     tablet      00:00:                               MOUTH ONCE           [place]



               00                                 DAILY IN           [facility]           

 

     levothyroxi      2019      Yes       [id number]           TAKE 1           

[facility]



     ne 88 mcg      0-29                               TABLET BY           ity o

f



     tablet      00:00:                               MOUTH ONCE           [place]



               00                                 DAILY IN           [facility]           

 

     levothyroxi      2019      Yes       [id number]           TAKE 1           

[facility]



     ne 88 mcg      0-29                               TABLET BY           ity o

f



     tablet      00:00:                               MOUTH ONCE           [place]



               00                                 DAILY IN           [facility]           

 

     levothyroxi      2019      Yes       [id number]           TAKE 1           

[facility]



     ne 88 mcg      0-29                               TABLET BY           ity o

f



     tablet      00:00:                               MOUTH ONCE           [place]



               00                                 DAILY IN           [facility]           

 

     levothyroxi      2019      Yes       [id number]           TAKE 1           

[facility]



     ne 88 mcg      0-29                               TABLET BY           ity o

f



     tablet      00:00:                               MOUTH ONCE           [place]



               00                                 DAILY IN           [facility]           

 

     levothyroxi      2019      Yes       [id number]           TAKE 1           

[facility]



     ne 88 mcg      0-29                               TABLET BY           ity o

f



     tablet      00:00:                               MOUTH ONCE           [place]



               00                                 DAILY IN           [facility]           

 

     levothyroxi      2019      Yes       [id number]           TAKE 1           

[facility]



     ne 88 mcg      0-29                               TABLET BY           ity o

f



     tablet      00:00:                               MOUTH ONCE           [place]



               00                                 DAILY IN           [facility]           

 

     levothyroxi      2019 2020- No        [id number]           TAKE 1          

 [facility]



     ne 88 mcg      0-29 04-10                          TABLET BY           ity 

of



     tablet      00:00: 00:00                          MOUTH ONCE           [place]

[facility]



               00   :00                           DAILY IN           [facility]           

 

     Cholecalcif      2019      Yes            1000U      Take 1,000          

 [facility]



     laura,      0-09                               Units by           ity of



     Vitamin D3,      18:44:                               mouth           [place]



     (VITAMIN      55                                 every           Medical



     D3) 1,000                                         morning.           [facility]



     unit                                                        



     capsule                                                        

 

     Cholecalcif      2019      Yes            1000U      Take 1,000          

 [facility]



     laura,      0-09                               Units by           ity of



     Vitamin D3,      18:44:                               mouth           [place]



     (VITAMIN      55                                 every           Medical



     D3) 1,000                                         morning.           [facility]



     unit                                                        



     capsule                                                        

 

     Cholecalcif      2019      Yes            1000U      Take 1,000          

 [facility]



     laura,      0-09                               Units by           ity of



     Vitamin D3,      18:44:                               mouth           [place]



     (VITAMIN      55                                 every           Medical



     D3) 1,000                                         morning.           [facility]



     unit                                                        



     capsule                                                        

 

     Cholecalcif      2019-      Yes            1000U      Take 1,000          

 [facility]



     laura,      0-09                               Units by           ity of



     Vitamin D3,      18:44:                               mouth           [place]



     (VITAMIN      55                                 every           Medical



     D3) 1,000                                         morning.           [facility]



     unit                                                        



     capsule                                                        

 

     Cholecalcif      20190      Yes            1000U      Take 1,000          

 [facility]



     laura,      5-20                               Units by           ity of



     Vitamin D3,      18:10:                               mouth           [place]



     (VITAMIN      39                                 every           Medical



     D3) 1,000                                         morning.           [facility]



     unit                                                        



     capsule                                                        

 

     sulfamethox      2019-      Yes       [id number] 1{tbl}      Take 1        

   [facility]



     azole-trime      5-20                               tablet by           [facility]

 of



     thoprim      00:00:                               mouth 2           [place]



     (BACTRIM      00                                 (two)           Medical



     DS) 800-160                                         times           [facility]



     mg per                                         daily.           



     tablet                                                        

 

     levothyroxi            Yes       [id number] 88ug      Take 1           

[facility]



     ne 88 mcg      4-18                               tablet by           ity o

f



     tablet      00:00:                               mouth           [place]



               00                                 every           Medical



                                                  morning.           [facility]

 

     naproxen            Yes       [id number] 375mg      Take 1           [facility]

[facility]



     375 mg EC      3-11                               tablet by           ity o

f



     tablet      00:00:                               mouth 2           [place]



               00                                 (two)           [facility]



                                                  times           [facility]



                                                  daily as           



                                                  needed           



                                                  (with           



                                                  meals).           

 

     naproxen            Yes       [id number] 375mg      Take 1           [facility]

[facility]



     375 mg EC      3-11                               tablet by           ity o

f



     tablet      00:00:                               mouth 2           [place]



               00                                 (two)           [facility]



                                                  times           [facility]



                                                  daily as           



                                                  needed           



                                                  (with           



                                                  meals).           

 

     naproxen            Yes       [id number] 375mg      Take 1           Un

[facility]



     375 mg EC      3-11                               tablet by           ity o

f



     tablet      00:00:                               mouth 2           [place]



               00                                 (two)           Medical



                                                  times           [facility]



                                                  daily as           



                                                  needed           



                                                  (with           



                                                  meals).           

 

     naproxen            Yes       [id number] 375mg      Take 1           Un

[facility]



     375 mg EC      3-11                               tablet by           ity o

f



     tablet      00:00:                               mouth 2           [place]



               00                                 (two)           Medical



                                                  times           [facility]



                                                  daily as           



                                                  needed           



                                                  (with           



                                                  meals).           

 

     naproxen            Yes       [id number] 375mg      Take 1           Un

[facility]



     375 mg EC      3-11                               tablet by           ity o

f



     tablet      00:00:                               mouth 2           [place]



               00                                 (two)           Medical



                                                  times           [facility]



                                                  daily as           



                                                  needed           



                                                  (with           



                                                  meals).           

 

     naproxen       2020- No        [id number] 375mg      Take 1           [facility]

[facility]



     375 mg EC      3-11 02-23                          tablet by           [facility] 

of



     tablet      00:00: 00:00                          mouth 2           [place]



               00   :00                           (two)           Medical



                                                  times           [facility]



                                                  daily as           



                                                  needed           



                                                  (with           



                                                  meals).           

 

     GALANTAMINE      2018      Yes                      TAKE 1           [facility]

[facility]



     4 mg tablet                                     TABLET BY           [facility]

 of



               00:00:                               MOUTH           [place]



               00                                 TWICE           Medical



                                                  DAILY           [facility]

 

     GALANTAMINE      2018- No                       TAKE 1           [facility]

[facility]



     4 mg tablet                                TABLET BY           it

y of



               00:00: 00:00                          MOUTH           [place]



               00   :00                           TWICE           Medical



                                                  DAILY           [facility]

 

     GALANTAMINE      2018- No                       TAKE 1           [facility]

[facility]



     4 mg tablet                                TABLET BY           it

y of



               00:00: 00:00                          MOUTH           [place]



               00   :00                           TWICE           Medical



                                                  DAILY           [facility]

 

     hydrocortis      2018      Yes                      Insert           [facility]

ers



     one       2-05                               into           [facility] of



     (PROCTOSOL      00:00:                               rectum 2           [facility]

[facility]



     HC) 2.5 %      00                                 (two)           Medical



     rectal                                         times           [facility]



     cream                                         daily.           

 

     hydrocortis      2018      Yes                      Insert           [facility]

ers



     one       2-05                               into           [facility] of



     (PROCTOSOL      00:00:                               rectum 2           [facility]

[facility]



     HC) 2.5 %      00                                 (two)           Medical



     rectal                                         times           [facility]



     cream                                         daily.           

 

     hydrocortis      2018      Yes                      Insert           [facility]

ers



     one       2-05                               into           [facility] of



     (PROCTOSOL      00:00:                               rectum 2           [facility]

[facility]



     HC) 2.5 %      00                                 (two)           Medical



     rectal                                         times           [facility]



     cream                                         daily.           

 

     hydrocortis      2018      Yes                      Insert           [facility]

ers



     one       2-05                               into           [facility] of



     (PROCTOSOL      00:00:                               rectum 2           [facility]

[facility]



     HC) 2.5 %      00                                 (two)           Medical



     rectal                                         times           [facility]



     cream                                         daily.           

 

     hydrocortis      2018      Yes                      Insert           [facility]

ers



     one       2-05                               into           ity of



     (PROCTOSOL      00:00:                               rectum 2           [facility]

[facility]



     HC) 2.5 %      00                                 (two)           Medical



     rectal                                         times           [facility]



     cream                                         daily.           

 

     hydrocortis      2018      Yes                      Insert           [facility]

ers



     one       2-05                               into           ity of



     (PROCTOSOL      00:00:                               rectum 2           [facility]

[facility]



     HC) 2.5 %      00                                 (two)           Medical



     rectal                                         times           [facility]



     cream                                         daily.           

 

     hydrocortis      2018      Yes                      Insert           [facility]

ers



     one       2-05                               into           [facility] of



     (PROCTOSOL      00:00:                               rectum 2           [facility]

[facility]



     HC) 2.5 %      00                                 (two)           Medical



     rectal                                         times           [facility]



     cream                                         daily.           

 

     hydrocortis      2018      Yes                      Insert           [facility]

ers



     one       2-05                               into           ity of



     (PROCTOSOL      00:00:                               rectum 2           [facility]

[facility]



     HC) 2.5 %      00                                 (two)           Medical



     rectal                                         times           [facility]



     cream                                         daily.           

 

     hydrocortis      2018      Yes                      Insert           [facility]

ers



     one       2-05                               into           ity of



     (PROCTOSOL      00:00:                               rectum 2           [facility]

[facility]



     HC) 2.5 %      00                                 (two)           Medical



     rectal                                         times           [facility]



     cream                                         daily.           

 

     hydrocortis      2018      Yes                      Insert           [facility]

ers



     one       2-05                               into           ity of



     (PROCTOSOL      00:00:                               rectum 2           [facility]

[facility]



     HC) 2.5 %      00                                 (two)           Medical



     rectal                                         times           [facility]



     cream                                         daily.           

 

     hydrocortis      2018      Yes                      Insert           [facility]

ers



     one       2-05                               into           ity of



     (PROCTOSOL      00:00:                               rectum 2           [facility]

[facility]



     HC) 2.5 %      00                                 (two)           Medical



     rectal                                         times           [facility]



     cream                                         daily.           

 

     hydrocortis      2018      Yes                      Insert           [facility]

ers



     one       2-05                               into           ity of



     (PROCTOSOL      00:00:                               rectum 2           [facility]

[facility]



     HC) 2.5 %      00                                 (two)           Medical



     rectal                                         times           [facility]



     cream                                         daily.           

 

     hydrocortis      2018 2020- No                       Insert           [facility]

[facility]



     one       2-05 04-09                          into           [facility] of



     (PROCTOSOL      00:00: 00:00                          rectum 2           [facility]

[facility]



     HC) 2.5 %      00   :00                           (two)           Medical



     rectal                                         times           [facility]



     cream                                         daily.           

 

     rivaroxaban      -0      Yes            15mg      Take 1           [facility]

[facility]



     15 mg      9-17                               tablet by           [facility] of



     tablet      00:00:                               mouth           [place]



               00                                 daily.           Medical



                                                                 [facility]

 

     rivaroxaban      2018-0      Yes            15mg      Take 1           [facility]

[facility]



     15 mg      9-17                               tablet by           [facility] of



     tablet      00:00:                               mouth           [place]



               00                                 daily.           Medical



                                                                 [facility]

 

     rivaroxaban      2018-0      Yes            15mg      Take 1           [facility]

[facility]



     15 mg      9-17                               tablet by           ity of



     tablet      00:00:                               mouth           [place]



               00                                 daily.           Medical



                                                                 [facility]

 

     rivaroxaban      2018-0      Yes            15mg      Take 1           [facility]

ers



     15 mg      9-17                               tablet by           ity of



     tablet      00:00:                               mouth           [place]



               00                                 daily.           [facility]



                                                                 [facility]

 

     rivaroxaban      2018-0      Yes            15mg      Take 1           [facility]

ers



     15 mg      9-17                               tablet by           ity of



     tablet      00:00:                               mouth           [place]



               00                                 daily.           Medical



                                                                 [facility]

 

     rivaroxaban      2018-0      Yes            15mg      Take 1           [facility]

ers



     15 mg      9-17                               tablet by           ity of



     tablet      00:00:                               mouth           [place]



               00                                 daily.           Medical



                                                                 [facility]

 

     rivaroxaban      2018-0      Yes            15mg      Take 1           [facility]

ers



     15 mg      9-17                               tablet by           ity of



     tablet      00:00:                               mouth           [place]



               00                                 daily.           Medical



                                                                 [facility]

 

     rivaroxaban      2018-0      Yes            15mg      Take 1           [facility]

ers



     15 mg      9-17                               tablet by           ity of



     tablet      00:00:                               mouth           [place]



               00                                 daily.           Medical



                                                                 [facility]

 

     rivaroxaban      2018-0      Yes            15mg      Take 1           [facility]

ers



     15 mg      9-17                               tablet by           ity of



     tablet      00:00:                               mouth           [place]



               00                                 daily.           Medical



                                                                 [facility]

 

     rivaroxaban      2018-0      Yes            15mg      Take 1           [facility]

ers



     15 mg      9-17                               tablet by           ity of



     tablet      00:00:                               mouth           [place]



               00                                 daily.           Medical



                                                                 [facility]

 

     rivaroxaban      2018-0      Yes            15mg      Take 1           [facility]

ers



     15 mg      9-17                               tablet by           ity of



     tablet      00:00:                               mouth           [place]



               00                                 daily.           Medical



                                                                 [facility]

 

     rivaroxaban      -0      Yes            15mg      Take 1           [facility]

ers



     15 mg      9-17                               tablet by           ity of



     tablet      00:00:                               mouth           [place]



               00                                 daily.           Medical



                                                                 [facility]

 

     rivaroxaban      2018-0 2020- No             15mg      Take 1           [facility]

vers



     15 mg      9-17 04-10                          tablet by           ity of



     tablet      00:00: 00:00                          mouth           [place]



               00   :00                           daily.           Medical



                                                                 [facility]

 

     HEMATINIC/F      -      Yes       [id number]           TAKE 1           

[facility]



     OLIC ACID      9-14                               TABLET BY           ity o

f



     324 mg (106      00:00:                               MOUTH           [place]



     mg iron)-1      00                                 EVERY DAY           [facility]

[facility]



     mg Tab                                                        [facility]

 

     mupirocin 2            Yes       [id number]           Apply  to       

    [facility]



     % ointment      8-29                               area(s) 3           ity 

of



               00:00:                               (three)           [place]



               00                                 times           Medical



                                                  daily.           [facility]

 

     mupirocin 2            Yes       [id number]           Apply  to       

    [facility]



     % ointment      8-29                               area(s) 3           ity 

of



               00:00:                               (three)           [place]



               00                                 times           Medical



                                                  daily.           [facility]

 

     mupirocin 2            Yes       [id number]           Apply  to       

    [facility]



     % ointment      8-29                               area(s) 3           ity 

of



               00:00:                               (three)           [place]



               00                                 times           Medical



                                                  daily.           [facility]

 

     mupirocin 2      -      Yes       [id number]           Apply  to       

    [facility]



     % ointment      8-29                               area(s) 3           ity 

of



               00:00:                               (three)           [place]



               00                                 times           Medical



                                                  daily.           [facility]

 

     mupirocin 2      2018-0      Yes       [id number]           Apply  to       

    [facility]



     % ointment      8-29                               area(s) 3           ity 

of



               00:00:                               (three)           [place]



               00                                 times           Medical



                                                  daily.           [facility]

 

     mupirocin 2      2018-0      Yes       [id number]           Apply  to       

    [facility]



     % ointment      8-29                               area(s) 3           ity 

of



               00:00:                               (three)           [place]



               00                                 times           Medical



                                                  daily.           [facility]

 

     mupirocin 2      2018-0      Yes       [id number]           Apply  to       

    [facility]



     % ointment      8-29                               area(s) 3           ity 

of



               00:00:                               (three)           [place]



               00                                 times           Medical



                                                  daily.           [facility]

 

     mupirocin 2      2018-0      Yes       [id number]           Apply  to       

    [facility]



     % ointment      8-29                               area(s) 3           ity 

of



               00:00:                               (three)           [place]



               00                                 times           Medical



                                                  daily.           [facility]

 

     mupirocin 2      2018-0      Yes       [id number]           Apply  to       

    [facility]



     % ointment      8-29                               area(s) 3           ity 

of



               00:00:                               (three)           [place]



               00                                 times           Medical



                                                  daily.           [facility]

 

     mupirocin 2      2018-0      Yes       [id number]           Apply  to       

    [facility]



     % ointment      8-29                               area(s) 3           ity 

of



               00:00:                               (three)           [place]



               00                                 times           Medical



                                                  daily.           [facility]

 

     mupirocin 2      2018-0      Yes       [id number]           Apply  to       

    [facility]



     % ointment      8-29                               area(s) 3           ity 

of



               00:00:                               (three)           [place]



               00                                 times           Medical



                                                  daily.           [facility]

 

     mupirocin 2      2018-0      Yes       [id number]           Apply  to       

    [facility]



     % ointment      8-29                               area(s) 3           ity 

of



               00:00:                               (three)           [place]



               00                                 times           Medical



                                                  daily.           [facility]

 

     mupirocin 2      2018-0      Yes       [id number]           Apply  to       

    [facility]



     % ointment      8-29                               area(s) 3           ity 

of



               00:00:                               (three)           [place]



               00                                 times           Medical



                                                  daily.           [facility]

 

     mupirocin 2      2018-0      Yes       [id number]           Apply  to       

    [facility]



     % ointment      8-29                               area(s) 3           ity 

of



               00:00:                               (three)           [place]



               00                                 times           Medical



                                                  daily.           [facility]

 

     mupirocin 2      2018-0      Yes       [id number]           [facility]  [facility]       

    [facility]



     % ointment      8-29                               area(s) 3           [facility] 

of



               00:00:                               (three)           [place]



               00                                 times           Medical



                                                  daily.           Branch

 

     mupirocin 2      2018-0      Yes       [id number]           Apply  to       

    [facility]



     % ointment      8-                               area(s) 3           [facility] 

of



               00:00:                               (three)           [place]



               00                                 times           Medical



                                                  daily.           Branch

 

     traMADOL      2018-0      Yes            50mg      Take 1           [facility]



     (ULTRAM) 50      8-26                               tablet by           ity

 of



     mg tablet      00:00:                               mouth           [place]



               00                                 every 6           Medical



                                                  (six)           Branch



                                                  hours as           



                                                  needed for           



                                                  Pain           



                                                  (scale           



                                                  4-6).           

 

     traMADOL      2018-0      Yes            50mg      Take 1           [facility]



     (ULTRAM) 50      8-26                               tablet by           ity

 of



     mg tablet      00:00:                               mouth           [place]



               00                                 every 6           Medical



                                                  (six)           Branch



                                                  hours as           



                                                  needed for           



                                                  Pain           



                                                  (scale           



                                                  4-6).           

 

     traMADOL      2018-0      Yes            50mg      Take 1           [facility]



     (ULTRAM) 50      8-26                               tablet by           ity

 of



     mg tablet      00:00:                               mouth           [place]



               00                                 every 6           Medical



                                                  (six)           Branch



                                                  hours as           



                                                  needed for           



                                                  Pain           



                                                  (scale           



                                                  4-6).           

 

     traMADOL      2018-0      Yes            50mg      Take 1           [facility]



     (ULTRAM) 50      8-26                               tablet by           ity

 of



     mg tablet      00:00:                               mouth           [place]



               00                                 every 6           Medical



                                                  (six)           Branch



                                                  hours as           



                                                  needed for           



                                                  Pain           



                                                  (scale           



                                                  4-6).           

 

     traMADOL      2018-0      Yes            50mg      Take 1           [facility]



     (ULTRAM) 50      8-26                               tablet by           ity

 of



     mg tablet      00:00:                               mouth           [place]



               00                                 every 6           Medical



                                                  (six)           Branch



                                                  hours as           



                                                  needed for           



                                                  Pain           



                                                  (scale           



                                                  4-6).           

 

     traMADOL      2018-0      Yes            50mg      Take 1           [facility]



     (ULTRAM) 50      8-26                               tablet by           ity

 of



     mg tablet      00:00:                               mouth           [place]



               00                                 every 6           Medical



                                                  (six)           Branch



                                                  hours as           



                                                  needed for           



                                                  Pain           



                                                  (scale           



                                                  4-6).           

 

     traMADOL      2018-0      Yes            50mg      Take 1           [facility]



     (ULTRAM) 50      8-26                               tablet by           ity

 of



     mg tablet      00:00:                               mouth           [place]



               00                                 every 6           Medical



                                                  (six)           Branch



                                                  hours as           



                                                  needed for           



                                                  Pain           



                                                  (scale           



                                                  4-6).           

 

     traMADOL      2018-0      Yes            50mg      Take 1           [facility]



     (ULTRAM) 50      8-26                               tablet by           ity

 of



     mg tablet      00:00:                               mouth           [place]



               00                                 every 6           Medical



                                                  (six)           Branch



                                                  hours as           



                                                  needed for           



                                                  Pain           



                                                  (scale           



                                                  4-6).           

 

     traMADOL      2018-0      Yes            50mg      Take 1           [facility]



     (ULTRAM) 50      8-26                               tablet by           ity

 of



     mg tablet      00:00:                               mouth           [place]



               00                                 every 6           Medical



                                                  (six)           Branch



                                                  hours as           



                                                  needed for           



                                                  Pain           



                                                  (scale           



                                                  4-6).           

 

     traMADOL      2018-0      Yes            50mg      Take 1           [facility]



     (ULTRAM) 50      8-26                               tablet by           ity

 of



     mg tablet      00:00:                               mouth           [place]



               00                                 every 6           Medical



                                                  (six)           Branch



                                                  hours as           



                                                  needed for           



                                                  Pain           



                                                  (scale           



                                                  4-6).           

 

     traMADOL      2018-0      Yes            50mg      Take 1           [facility]



     (ULTRAM) 50      8-26                               tablet by           ity

 of



     mg tablet      00:00:                               mouth           [place]



               00                                 every 6           Medical



                                                  (six)           Branch



                                                  hours as           



                                                  needed for           



                                                  Pain           



                                                  (scale           



                                                  4-6).           

 

     traMADOL      2018-0      Yes            50mg      Take 1           [facility]



     (ULTRAM) 50      8-26                               tablet by           ity

 of



     mg tablet      00:00:                               mouth           [place]



               00                                 every 6           Medical



                                                  (six)           Branch



                                                  hours as           



                                                  needed for           



                                                  Pain           



                                                  (scale           



                                                  4-6).           

 

     traMADOL      2018-0      Yes            50mg      Take 1           [facility]



     (ULTRAM) 50      8-26                               tablet by           ity

 of



     mg tablet      00:00:                               mouth           [place]



               00                                 every 6           Medical



                                                  (six)           Branch



                                                  hours as           



                                                  needed for           



                                                  Pain           



                                                  (scale           



                                                  4-6).           

 

     traMADOL      2018-0      Yes            50mg      Take 1           [facility]



     (ULTRAM) 50      8-26                               tablet by           [facility]

 of



     mg tablet      00:00:                               mouth           [place]



               00                                 every 6           Medical



                                                  (six)           Branch



                                                  hours as           



                                                  needed for           



                                                  Pain           



                                                  (scale           



                                                  4-6).           

 

     traMADOL      2018-0      Yes            50mg      Take 1           [facility]



     (ULTRAM) 50      8-26                               tablet by           ity

 of



     mg tablet      00:00:                               mouth           [place]



               00                                 every 6           Medical



                                                  (six)           Branch



                                                  hours as           



                                                  needed for           



                                                  Pain           



                                                  (scale           



                                                  4-6).           

 

     traMADOL      2018-0      Yes            50mg      Take 1           [facility]



     (ULTRAM) 50      8-26                               tablet by           ity

 of



     mg tablet      00:00:                               mouth           [place]



               00                                 every 6           Medical



                                                  (six)           Branch



                                                  hours as           



                                                  needed for           



                                                  Pain           



                                                  (scale           



                                                  4-6).           

 

     ciclopirox      -      Yes       [id number]           Apply  to        

   [facility]



     8 %       0-04                               area(s) at           [facility] of



     solution      00:00:                               bedtime.           [place]



               00                                 Apply to           Medical



                                                  dry            Branch



                                                  nail/surro           



                                                  unding           



                                                  skin.           



                                                  Reapply           



                                                  nightly.           



                                                  Remove           



                                                  with           



                                                  alcohol,           



                                                  trim/file           



                                                  nails on           



                                                  day 7.           



                                                  repeat           

 

     ciclopirox      -      Yes       [id number]           Apply  to        

   [facility]



     8 %       0-04                               area(s) at           [facility] of



     solution      00:00:                               bedtime.           [place]



               00                                 Apply to           Medical



                                                  dry            Branch



                                                  nail/surro           



                                                  unding           



                                                  skin.           



                                                  Reapply           



                                                  nightly.           



                                                  Remove           



                                                  with           



                                                  alcohol,           



                                                  trim/file           



                                                  nails on           



                                                  day 7.           



                                                  repeat           

 

     ciclopirox      2017      Yes       [id number]           Apply  to        

   [facility]



     8 %       0-04                               area(s) at           [facility] of



     solution      00:00:                               bedtime.           [place]



               00                                 Apply to           Medical



                                                  dry            Branch



                                                  nail/surro           



                                                  unding           



                                                  skin.           



                                                  Reapply           



                                                  nightly.           



                                                  Remove           



                                                  with           



                                                  alcohol,           



                                                  trim/file           



                                                  nails on           



                                                  day 7.           



                                                  repeat           

 

     ciclopirox      2017      Yes       [id number]           Apply  to        

   [facility]



     8 %       0-04                               area(s) at           [facility] of



     solution      00:00:                               bedtime.           [place]



               00                                 Apply to           [facility]



                                                  dry            Branch



                                                  nail/surro           



                                                  unding           



                                                  skin.           



                                                  Reapply           



                                                  nightly.           



                                                  Remove           



                                                  with           



                                                  alcohol,           



                                                  trim/file           



                                                  nails on           



                                                  day 7.           



                                                  repeat           

 

     ciclopirox      2017      Yes       [id number]           Apply  to        

   [facility]



     8 %       0-04                               area(s) at           [facility] of



     solution      00:00:                               bedtime.           [place]



               00                                 Apply to           Medical



                                                  dry            Branch



                                                  nail/surro           



                                                  unding           



                                                  skin.           



                                                  Reapply           



                                                  nightly.           



                                                  Remove           



                                                  with           



                                                  alcohol,           



                                                  trim/file           



                                                  nails on           



                                                  day 7.           



                                                  repeat           

 

     ciclopirox      2017      Yes       [id number]           Apply  to        

   [facility]



     8 %       0-04                               area(s) at           [facility] of



     solution      00:00:                               bedtime.           [place]



               00                                 Apply to           [facility]



                                                  dry            Branch



                                                  nail/surro           



                                                  unding           



                                                  skin.           



                                                  Reapply           



                                                  nightly.           



                                                  Remove           



                                                  with           



                                                  alcohol,           



                                                  trim/file           



                                                  nails on           



                                                  day 7.           



                                                  repeat           

 

     ciclopirox      2017      Yes       [id number]           Apply  to        

   [facility]



     8 %       0-04                               area(s) at           [facility] of



     solution      00:00:                               bedtime.           [place]



               00                                 Apply to           Medical



                                                  dry            Branch



                                                  nail/surro           



                                                  unding           



                                                  skin.           



                                                  Reapply           



                                                  nightly.           



                                                  Remove           



                                                  with           



                                                  alcohol,           



                                                  trim/file           



                                                  nails on           



                                                  day 7.           



                                                  repeat           

 

     ciclopirox      2017      Yes       [id number]           Apply  to        

   [facility]



     8 %       0-04                               area(s) at           [facility] of



     solution      00:00:                               bedtime.           [place]



               00                                 Apply to           Medical



                                                  dry            Branch



                                                  nail/surro           



                                                  unding           



                                                  skin.           



                                                  Reapply           



                                                  nightly.           



                                                  Remove           



                                                  with           



                                                  alcohol,           



                                                  trim/file           



                                                  nails on           



                                                  day 7.           



                                                  repeat           

 

     ciclopirox      2017      Yes       [id number]           Apply  to        

   [facility]



     8 %       0-04                               area(s) at           [facility] of



     solution      00:00:                               bedtime.           [place]



               00                                 Apply to           Medical



                                                  dry            Branch



                                                  nail/surro           



                                                  unding           



                                                  skin.           



                                                  Reapply           



                                                  nightly.           



                                                  Remove           



                                                  with           



                                                  alcohol,           



                                                  trim/file           



                                                  nails on           



                                                  day 7.           



                                                  repeat           

 

     ciclopirox      2017      Yes       [id number]           Apply  to        

   [facility]



     8 %       0-04                               area(s) at           [facility] of



     solution      00:00:                               bedtime.           [place]



               00                                 Apply to           Medical



                                                  dry            [facility]



                                                  nail/surro           



                                                  unding           



                                                  skin.           



                                                  Reapply           



                                                  nightly.           



                                                  Remove           



                                                  with           



                                                  alcohol,           



                                                  trim/file           



                                                  nails on           



                                                  day 7.           



                                                  repeat           

 

     ciclopirox      2017      Yes       [id number]           Apply  to        

   [facility]



     8 %       0-04                               area(s) at           [facility] of



     solution      00:00:                               bedtime.           [place]



               00                                 Apply to           Medical



                                                  dry            [facility]



                                                  nail/surro           



                                                  unding           



                                                  skin.           



                                                  Reapply           



                                                  nightly.           



                                                  Remove           



                                                  with           



                                                  alcohol,           



                                                  trim/file           



                                                  nails on           



                                                  day 7.           



                                                  repeat           

 

     ciclopirox      2017      Yes       [id number]           Apply  to        

   [facility]



     8 %       0-04                               area(s) at           [facility] of



     solution      00:00:                               bedtime.           [place]



               00                                 Apply to           [facility]



                                                  dry            [facility]



                                                  nail/surro           



                                                  unding           



                                                  skin.           



                                                  Reapply           



                                                  nightly.           



                                                  Remove           



                                                  with           



                                                  alcohol,           



                                                  trim/file           



                                                  nails on           



                                                  day 7.           



                                                  repeat           

 

     ciclopirox      2017 2020- No        [id number]           Apply  to       

    [facility]



     8 %       0-04 04-09                          area(s) at           ity of



     solution      00:00: 00:00                          bedtime.           [place]

[facility]



               00   :00                           Apply to           Medical



                                                  dry            [facility]



                                                  nail/surro           



                                                  unding           



                                                  skin.           



                                                  Reapply           



                                                  nightly.           



                                                  Remove           



                                                  with           



                                                  alcohol,           



                                                  trim/file           



                                                  nails on           



                                                  day 7.           



                                                  repeat           

 

     acetaminoph      2017      Yes            1{tbl}      Take 1           Un

[facility]



     en-codeine      8-09                               tablet by           ity 

of



     (TYLENOL-CO      00:00:                               mouth           [place]



     DEINE #3)      00                                 every 4           Medical



     300-30 mg                                         (four)           [facility]



     tablet                                         hours as           



                                                  needed for           



                                                  Pain           



                                                  (scale           



                                                  4-6) or           



                                                  Pain           



                                                  (scale           



                                                  7-10).           

 

     acetaminoph      2017-0      Yes            1{tbl}      Take 1           Un

[facility]



     en-codeine      8-09                               tablet by           ity 

of



     (TYLENOL-CO      00:00:                               mouth           [place]



     DEINE #3)      00                                 every 4           Medical



     300-30 mg                                         (four)           [facility]



     tablet                                         hours as           



                                                  needed for           



                                                  Pain           



                                                  (scale           



                                                  4-6) or           



                                                  Pain           



                                                  (scale           



                                                  7-10).           

 

     acetaminoph      2017-      Yes            1{tbl}      Take 1           Un

[facility]



     en-codeine      8-09                               tablet by           ity 

of



     (TYLENOL-CO      00:00:                               mouth           [place]



     DEINE #3)      00                                 every 4           Medical



     300-30 mg                                         (four)           [facility]



     tablet                                         hours as           



                                                  needed for           



                                                  Pain           



                                                  (scale           



                                                  4-6) or           



                                                  Pain           



                                                  (scale           



                                                  7-10).           

 

     acetaminoph            Yes            1{tbl}      Take 1           Un

[facility]



     en-codeine      8-09                               tablet by           [facility] 

of



     (TYLENOL-CO      00:00:                               mouth           [place]



     DEINE #3)      00                                 every 4           Medical



     300-30 mg                                         (four)           [facility]



     tablet                                         hours as           



                                                  needed for           



                                                  Pain           



                                                  (scale           



                                                  4-6) or           



                                                  Pain           



                                                  (scale           



                                                  7-10).           

 

     acetaminoph            Yes            1{tbl}      Take 1           Un

[facility]



     en-codeine      8-09                               tablet by           ity 

of



     (TYLENOL-CO      00:00:                               mouth           [place]



     DEINE #3)      00                                 every 4           Medical



     300-30 mg                                         (four)           [facility]



     tablet                                         hours as           



                                                  needed for           



                                                  Pain           



                                                  (scale           



                                                  4-6) or           



                                                  Pain           



                                                  (scale           



                                                  7-10).           

 

     acetaminoph       2020- No             1{tbl}      Take 1           U

nivers



     en-codeine      8-09 02-23                          tablet by           ity

 of



     (TYLENOL-CO      00:00: 00:00                          mouth           [place]

s



     DEINE #3)      00   :00                           every 4           Medical



     300-30 mg                                         (four)           [facility]



     tablet                                         hours as           



                                                  needed for           



                                                  Pain           



                                                  (scale           



                                                  4-6) or           



                                                  Pain           



                                                  (scale           



                                                  7-10).           

 

     lovastatin      20170      Yes            20mg      Take 20 mg           U

nivers



     20 mg      7-02                               by mouth           ity of



     tablet      00:00:                               daily.           [address]

 

     lovastatin      [address]      Yes            20mg      Take 20 mg           U

nivers



     20 mg      7-02                               by mouth           ity of



     tablet      00:00:                               daily.           [address]

 

     lovastatin      2017-0      Yes            20mg      Take 20 mg           U

nivers



     20 mg      7-02                               by mouth           ity of



     tablet      00:00:                               daily.           [address]

 

     lovastatin      2017-0      Yes            20mg      Take 20 mg           U

nivers



     20 mg      7-02                               by mouth           ity of



     tablet      00:00:                               daily.           [address]

 

     lovastatin      2017-0      Yes            20mg      Take 20 mg           U

nivers



     20 mg      7-02                               by mouth           ity of



     tablet      00:00:                               daily.           [address]

 

     lovastatin      2017-0      Yes            20mg      Take 20 mg           U

nivers



     20 mg      7-02                               by mouth           ity of



     tablet      00:00:                               daily.           [address]

 

     lovastatin      2017-0      Yes            20mg      Take 20 mg           U

nivers



     20 mg      7-02                               by mouth           ity of



     tablet      00:00:                               daily.           [place]



                                                               [facility]

 

     lovastatin      2017-0      Yes            20mg      Take 20 mg           U

nivers



     20 mg      7-02                               by mouth           ity of



     tablet      00:00:                               daily.           [place]



                                                               [facility]

 

     lovastatin      2017-0      Yes            20mg      Take 20 mg           U

nivers



     20 mg      7-02                               by mouth           ity of



     tablet      00:00:                               daily.           [place]



                                                               [facility]

 

     lovastatin      2017-0      Yes            20mg      Take 20 mg           U

nivers



     20 mg      7-02                               by mouth           ity of



     tablet      00:00:                               daily.           [place]



                                                               [facility]

 

     lovastatin      2017-0      Yes            20mg      Take 20 mg           U

nivers



     20 mg      7-02                               by mouth           ity of



     tablet      00:00:                               daily.           [place]



                                                               [facility]

 

     lovastatin      2017-0      Yes            20mg      Take 20 mg           U

nivers



     20 mg      7-02                               by mouth           ity of



     tablet      00:00:                               daily.           [place]



                                                               [facility]

 

     lovastatin      -0      Yes            20mg      Take 20 mg           U

nivers



     20 mg      7-02                               by mouth           ity of



     tablet      00:00:                               daily.           [place]



                                                               [facility]

 

     lovastatin      -0      Yes            20mg      Take 20 mg           U

nivers



     20 mg      7-02                               by mouth           ity of



     tablet      00:00:                               daily.           [place]



                                                               [facility]

 

     lovastatin      2017-0      Yes            20mg      Take 20 mg           U

nivers



     20 mg      7-02                               by mouth           ity of



     tablet      00:00:                               daily.           [place]



                                                               [facility]

 

     lovastatin      2017-0      Yes            20mg      Take 20 mg           U

nivers



     20 mg      7-02                               by mouth           ity of



     tablet      00:00:                               daily.           [place]



                                                               [facility]

 

     carvedilol            Yes            37.5mg      Take 37.5           

[facility]



     (COREG) 25      3-03                               mg by           ity of



     mg tablet      00:00:                               mouth 2           [place]



                                                (two)           Medical



                                                  times           [facility]



                                                  daily with           



                                                  meals.           

 

     digoxin            Yes            125ug      Take 125           Unive

rs



     (LANOXIN)      3-03                               mcg by           ity of



     125 mcg      00:00:                               mouth           [place]



     tablet      00                                 daily.           [facility]

 

     carvedilol      0      Yes            37.5mg      Take 37.5           

[facility]



     (COREG) 25      3-03                               mg by           ity of



     mg tablet      00:00:                               mouth 2           [place]



                                                (two)           Medical



                                                  times           [facility]



                                                  daily with           



                                                  meals.           

 

     digoxin      0      Yes            125ug      Take 125           Unive

rs



     (LANOXIN)      3-03                               mcg by           ity of



     125 mcg      00:00:                               mouth           [place]



     tablet      00                                 daily.           Medical



                                                                 [facility]

 

     carvedilol            Yes            37.5mg      Take 37.5           

[facility]



     (COREG) 25      3-03                               mg by           ity of



     mg tablet      00:00:                               mouth 2           [place]



               00                                 (two)           Medical



                                                  times           [facility]



                                                  daily with           



                                                  meals.           

 

     digoxin            Yes            125ug      Take 125           Unive

rs



     (LANOXIN)      3-03                               mcg by           ity of



     125 mcg      00:00:                               mouth           [place]



     tablet      00                                 daily.           Medical



                                                                 [facility]

 

     carvedilol            Yes            37.5mg      Take 37.5           

[facility]



     (COREG) 25      3-03                               mg by           ity of



     mg tablet      00:00:                               mouth 2           [place]



               00                                 (two)           Medical



                                                  times           [facility]



                                                  daily with           



                                                  meals.           

 

     digoxin            Yes            125ug      Take 125           Unive

rs



     (LANOXIN)      3-03                               mcg by           ity of



     125 mcg      00:00:                               mouth           [place]



     tablet      00                                 daily.           Medical



                                                                 [facility]

 

     carvedilol            Yes            37.5mg      Take 37.5           

[facility]



     (COREG) 25      3-03                               mg by           ity of



     mg tablet      00:00:                               mouth 2           [place]



                                                (two)           Medical



                                                  times           [facility]



                                                  daily with           



                                                  meals.           

 

     digoxin            Yes            125ug      Take 125           Unive

rs



     (LANOXIN)      3-03                               mcg by           ity of



     125 mcg      00:00:                               mouth           [place]



     tablet      00                                 daily.           Medical



                                                                 [facility]

 

     carvedilol       2020- No             37.5mg      Take 37.5          

 [facility]



     (COREG) 25      3-03 02-23                          mg by           ity of



     mg tablet      00:00: 00:00                          mouth 2           [place]

[facility]



               00   :00                           (two)           Medical



                                                  times           [place]



                                                  daily with           



                                                  meals.           

 

     digoxin       2020- No             125ug      Take 125           Univ

ers



     (LANOXIN)      3-03 02-23                          mcg by           ity of



     125 mcg      00:00: 00:00                          mouth           [place]



     tablet      00   :00                           daily.           Medical



                                                                 [facility]

 

     losartan            Yes            50mg      Take 50 mg           Uni

vers



     (COZAAR) 50      2-25                               by mouth           ity 

of



     mg tablet      00:00:                               daily.           [place]



               00                                                Medical



                                                                 [facility]

 

     losartan            Yes            50mg      Take 50 mg           Uni

vers



     (COZAAR) 50      2-25                               by mouth           ity 

of



     mg tablet      00:00:                               daily.           [place]



               00                                                Medical



                                                                 [facility]

 

     losartan            Yes            50mg      Take 50 mg           Uni

vers



     (COZAAR) 50      2-25                               by mouth           ity 

of



     mg tablet      00:00:                               daily.           [address]

 

     losartan            Yes            50mg      Take 50 mg           Uni

vers



     (COZAAR) 50      2-25                               by mouth           ity 

of



     mg tablet      00:00:                               daily.           [address]

 

     losartan            Yes            50mg      Take 50 mg           Uni

vers



     (COZAAR) 50      2-25                               by mouth           ity 

of



     mg tablet      00:00:                               daily.           [address]

 

     losartan       2020- No             50mg      Take 50 mg           Un

rossy



     (COZAAR) 50      2-25 02-23                          by mouth           ity

 of



     mg tablet      00:00: 00:00                          daily.           [place]



               00   :00                                          [facility]







Immunizations







           Ordered    Filled Immunization Date       Status     Comments   [facility]

e



           Immunization Name Name                                        

 

           Influenza High Dose            2019 Completed             [facility]

rsity of



                                 00:00:00                         [facility]

 

           Pneumococcal            2019 Completed             [facility] o

f



           Polysaccharide,            00:00:00                         [place] [facility]

ical



           PPSV23 (PNEUMOVAX)                                             [facility]

 

           Influenza High Dose            2019 Completed             [facility]

[facility] of



                                 00:00:00                         [facility]

 

           Pneumococcal            2019 Completed             [facility]

f



           Polysaccharide,            00:00:00                         [facility]

[facility]



           PPSV23 (PNEUMOVAX)                                             [facility]

 

           Influenza High Dose            2019 Completed             [facility]

[facility] of



                                 00:00:00                         [facility]

 

           Pneumococcal            2019 Completed             [facility]

f



           Polysaccharide,            00:00:00                         [facility]

[facility]



           PPSV23 (PNEUMOVAX)                                             [facility]

 

           Influenza High Dose            2019 Completed             [facility]

rsity of



                                 00:00:00                         [facility]

 

           Pneumococcal            2019 Completed             [facility]

f



           Polysaccharide,            00:00:00                         [facility]

[facility]



           PPSV23 (PNEUMOVAX)                                             [facility]

 

           Influenza High Dose            2019 Completed             [facility]

[facility]ity of



                                 00:00:00                         [facility]

 

           Pneumococcal            2019 Completed             [facility]

f



           Polysaccharide,            00:00:00                         [facility]

[facility]



           PPSV23 (PNEUMOVAX)                                             [facility]

 

           Influenza High Dose            2019 Completed             [facility]

rsity of



                                 00:00:00                         [facility]

 

           Pneumococcal            2019 Completed             [facility]

f



           Polysaccharide,            00:00:00                         [facility]

[facility]



           PPSV23 (PNEUMOVAX)                                             [facility]

 

           Influenza High Dose            2019 Completed             [facility]

rsity of



                                 00:00:00                         [facility]

 

           Pneumococcal            2019 Completed             [facility]

f



           Polysaccharide,            00:00:00                         [facility]

[facility]



           PPSV23 (PNEUMOVAX)                                             [facility]

 

           Influenza High Dose            2019 Completed             [facility]

rsity of



                                 00:00:00                         [facility]

 

           Pneumococcal            2019 Completed             [facility]

f



           Polysaccharide,            00:00:00                         [facility]

[facility]



           PPSV23 (PNEUMOVAX)                                             [facility]

 

           Influenza High Dose            2019 Completed             [facility]

rsity of



                                 00:00:00                         [facility]

 

           Pneumococcal            2019 Completed             [facility]

f



           Polysaccharide,            00:00:00                         [facility]

[facility]



           PPSV23 (PNEUMOVAX)                                             [facility]

 

           Influenza High Dose            2019 Completed             [facility]

rsity of



                                 00:00:00                         [facility]

 

           Pneumococcal            2019 Completed             [facility]

f



           Polysaccharide,            00:00:00                         [facility]

[facility]



           PPSV23 (PNEUMOVAX)                                             [facility]

 

           Influenza High Dose            2019 Completed             [facility]

rs[facility] of



                                 00:00:00                         [facility]

 

           Pneumococcal            2019 Completed             [facility]

f



           Polysaccharide,            00:00:00                         [facility]

[facility]



           PPSV23 (PNEUMOVAX)                                             [facility]

 

           Influenza High Dose            2019 Completed             [facility]

rsity of



                                 00:00:00                         [facility]

 

           Pneumococcal            2019 Completed             [facility]

f



           Polysaccharide,            00:00:00                         [facility]

[facility]



           PPSV23 (PNEUMOVAX)                                             [facility]

 

           Influenza High Dose            2019 Completed             [facility]

rsity of



                                 00:00:00                         [facility]

 

           Pneumococcal            2019 Completed             [facility]

f



           Polysaccharide,            00:00:00                         [facility]

[facility]



           PPSV23 (PNEUMOVAX)                                             [facility]

 

           Influenza High Dose            2019 Completed             [facility]

[facility] of



                                 00:00:00                         [facility]

 

           Pneumococcal            2019 Completed             [facility]

f



           Polysaccharide,            00:00:00                         [facility]

[facility]



           PPSV23 (PNEUMOVAX)                                             [facility]

 

           Influenza High Dose            2019 Completed             [facility]

rsity of



                                 00:00:00                         [facility]

 

           Pneumococcal            2019 Completed             [facility]

f



           Polysaccharide,            00:00:00                         [facility]

[facility]



           PPSV23 (PNEUMOVAX)                                             [facility]

 

           Influenza High Dose            2017-10-12 Completed             [facility]

[facility] of



                                 00:00:00                         [facility]

 

           Influenza High Dose            2017-10-12 Completed             [facility]

rs[facility] of



                                 00:00:00                         [facility]

 

           Influenza High Dose            2017-10-12 Completed             [facility]

rsity of



                                 00:00:00                         [facility]

 

           Influenza High Dose            2017-10-12 Completed             [facility]

rs[facility] of



                                 00:00:00                         [facility]

 

           Influenza High Dose            2017-10-12 Completed             [facility]

rsity of



                                 00:00:00                         [facility]

 

           Influenza High Dose            2017-10-12 Completed             [facility]

rsity of



                                 00:00:00                         [facility]

 

           Influenza High Dose            2017-10-12 Completed             [facility]

rsity of



                                 00:00:00                         [facility]

 

           Influenza High Dose            2017-10-12 Completed             [facility]

rsity of



                                 00:00:00                         [facility]

 

           Influenza High Dose            2017-10-12 Completed             [facility]

rs[facility] of



                                 00:00:00                         [facility]

 

           Influenza High Dose            2017-10-12 Completed             [facility]

rsity of



                                 00:00:00                         [facility]

 

           Influenza High Dose            2017-10-12 Completed             [facility]

rsity of



                                 00:00:00                         [facility]

 

           Influenza High Dose            2017-10-12 Completed             [facility]

rsity of



                                 00:00:00                         [facility]

 

           Influenza High Dose            2017-10-12 Completed             [facility]

rsity of



                                 00:00:00                         [facility]

 

           Influenza High Dose            2017-10-12 Completed             [facility]

rs[facility] of



                                 00:00:00                         [facility]

 

           Influenza High Dose            2017-10-12 Completed             [facility]

[facility] of



                                 00:00:00                         [facility]

 

           Influenza High Dose            2017-10-12 Completed             [facility]

[facility] of



                                 00:00:00                         [facility]







Vital Signs







             Vital Name   Observation Time Observation Value Comments     Source

 

             Systolic blood 2020-04-10 21:00:00 171 mm[Hg]                [facility]

[facility] of



             pressure                                            [facility]

 

             Diastolic blood 2020-04-10 21:00:00 76 mm[Hg]                 [facility]

[facility] of



             pressure                                            [facility]

 

             Heart rate   2020-04-10 21:00:00 87 /min                   [facility]

 

             Body temperature 2020-04-10 21:00:00 36.67 [place]                 [facility]



                                                                 [facility]

 

             Respiratory rate 2020-04-10 21:00:00 20 /min                   [facility]

 

             Oxygen saturation in 2020-04-10 21:00:00 98 /min                   

[facility]



             Arterial blood by                                        [facility]

[facility]



             Pulse oximetry                                        [facility]

 

             Body weight  2020 18:24:00 72.576 kg                 [facility]

 

             BMI          2020 18:24:00 28.34 kg/m2               [facility]

 

             Systolic blood 2020-04-10 21:00:00 171 mm[Hg]                [facility]

[facility] of



             pressure                                            [facility]



                                                                 [facility]

 

             Diastolic blood 2020-04-10 21:00:00 76 mm[Hg]                 [facility]

[facility] of



             pressure                                            [facility]



                                                                 [facility]

 

             Heart rate   2020-04-10 21:00:00 87 /min                   [facility]

[facility] of



                                                                 [facility]



                                                                 [facility]

 

             Body temperature 2020-04-10 21:00:00 36.67 [place]                 [facility]

ersity of



                                                                 [facility]



                                                                 [facility]

 

             Respiratory rate 2020-04-10 21:00:00 20 /min                   [facility]

ers[facility] of



                                                                 [facility]



                                                                 [facility]

 

             Oxygen saturation in 2020-04-10 21:00:00 98 /min                   

[facility]



             Arterial blood by                                        [facility]

[facility]



             Pulse oximetry                                        [facility]

 

             Body weight  2020 18:24:00 72.576 kg                 [facility]

[facility] of



                                                                 [facility]



                                                                 [facility]

 

             BMI          2020 18:24:00 28.34 kg/m2               [facility]

ty of



                                                                 [facility]



                                                                 [facility]

 

             Systolic blood 2020 16:00:00 179 mm[Hg]                [facility]

[facility]ity of



             pressure                                            [facility]



                                                                 [facility]

 

             Diastolic blood 2020 16:00:00 68 mm[Hg]                 [facility]

rsity of



             pressure                                            [facility]



                                                                 [facility]

 

             Heart rate   2020 16:00:00 69 /min                   [facility]

[facility] of



                                                                 [facility]



                                                                 [facility]

 

             Respiratory rate 2020 16:00:00 15 /min                   [facility]

ersity of



                                                                 [facility]



                                                                 [facility]

 

             Oxygen saturation in 2020 16:00:00 98 /min                   

[facility]



             Arterial blood by                                        [facility]

[facility]



             Pulse oximetry                                        [facility]

 

             Body temperature 2020 15:01:00 37.06 [place]                 [facility]

ersity of



                                                                 [facility]



                                                                 [facility]

 

             Body weight  2020 15:01:00 72.576 kg                 [facility]

[facility] of



                                                                 [facility]



                                                                 [facility]

 

             BMI          2020 15:01:00 28.34 kg/m2               [facility]

ty of



                                                                 [facility]



                                                                 [facility]

 

             Systolic blood 2020 16:00:00 179 mm[Hg]                [facility]

[facility]ity of



             pressure                                            [facility]



                                                                 [facility]

 

             Diastolic blood 2020 16:00:00 68 mm[Hg]                 [facility]

rsity of



             pressure                                            [facility]



                                                                 [facility]

 

             Heart rate   2020 16:00:00 69 /min                   [facility]

[facility] of



                                                                 [facility]



                                                                 [facility]

 

             Respiratory rate 2020 16:00:00 15 /min                   [facility]

ers[facility] of



                                                                 [facility]



                                                                 [facility]

 

             Oxygen saturation in 2020 16:00:00 98 /min                   

[facility]



             Arterial blood by                                        [facility]

[facility]



             Pulse oximetry                                        [facility]

 

             Body temperature 2020 15:01:00 37.06 [place]                 [facility]

ers[facility] of



                                                                 [facility]



                                                                 [facility]

 

             Body weight  2020 15:01:00 72.576 kg                 [facility]

[facility] of



                                                                 [facility]



                                                                 [facility]

 

             BMI          2020 15:01:00 28.34 kg/m2               [facility]

[facility] of



                                                                 [facility]



                                                                 [facility]

 

             Systolic blood 2020 17:42:00 116 mm[Hg]                [facility]

[facility]ity of



             pressure                                            [facility]



                                                                 [facility]

 

             Diastolic blood 2020 17:42:00 72 mm[Hg]                 [facility]

[facility]ity of



             pressure                                            [facility]



                                                                 [facility]

 

             Heart rate   2020 17:42:00 77 /min                   [facility]

[facility] of



                                                                 [facility]



                                                                 [facility]

 

             Body temperature 2020 17:42:00 36.72 [place]                 [facility]

ersity of



                                                                 [facility]



                                                                 [facility]

 

             Respiratory rate 2020 17:42:00 18 /min                   [facility]

ers[facility] of



                                                                 [facility]



                                                                 [facility]

 

             Oxygen saturation in 2020 17:42:00 97 /min                   

[facility]



             Arterial blood by                                        [facility]

[facility]



             Pulse oximetry                                        [facility]

 

             Body height  2020 21:33:00 160 cm                    [facility]

[facility] of



                                                                 [facility]



                                                                 [facility]

 

             Body weight  2020 21:33:00 64.819 kg                 [facility]

[facility] of



                                                                 [facility]



                                                                 [facility]

 

             BMI          2020 21:33:00 25.31 kg/m2               [facility]

[facility] of



                                                                 [facility]



                                                                 [facility]

 

             Systolic blood 2020 17:42:00 116 mm[Hg]                [facility]

[facility]ity of



             pressure                                            [facility]



                                                                 [facility]

 

             Diastolic blood 2020 17:42:00 72 mm[Hg]                 [facility]

rsity of



             pressure                                            [facility]



                                                                 [facility]

 

             Heart rate   2020 17:42:00 77 /min                   [facility]

[facility] of



                                                                 [facility]



                                                                 [facility]

 

             Body temperature 2020 17:42:00 36.72 [place]                 [facility]

ersity of



                                                                 [facility]



                                                                 [facility]

 

             Respiratory rate 2020 17:42:00 18 /min                   [facility]

ers[facility] of



                                                                 [facility]



                                                                 [facility]

 

             Oxygen saturation in 2020 17:42:00 97 /min                   

[facility]



             Arterial blood by                                        [facility]

[facility]



             Pulse oximetry                                        [facility]

 

             Body height  2020 21:33:00 160 cm                    [facility]

[facility]



                                                                 [facility]



                                                                 [facility]

 

             Body weight  2020 21:33:00 64.819 kg                 [facility]

[facility] of



                                                                 [facility]



                                                                 [facility]

 

             BMI          2020 21:33:00 25.31 kg/m2               [facility]

[facility] of



                                                                 [facility]



                                                                 [facility]

 

             Systolic blood 2020 20:49:00 115 mm[Hg]                [facility]

[facility]ity of



             pressure                                            [facility]

 

             Diastolic blood 2020 20:49:00 70 mm[Hg]                 [facility]

rsity of



             pressure                                            [facility]

 

             Heart rate   2020 20:49:00 72 /min                   [facility]

[facility] of



                                                                 [facility]

 

             Body temperature 2020 20:49:00 36.11 [place]                 [facility]

ersity of



                                                                 [facility]

 

             Respiratory rate 2020 20:49:00 16 /min                   [facility]

ers[facility] of



                                                                 [facility]

 

             Body height  2020 20:49:00 157.5 cm                  [facility]

[facility] of



                                                                 [facility]

 

             Body weight  2020 20:49:00 63.504 kg                 [facility]

[facility] of



                                                                 [facility]

 

             BMI          2020 20:49:00 25.61 kg/m2               [facility]

[facility] of



                                                                 [facility]

 

             Systolic blood 2020 20:49:00 115 mm[Hg]                [facility]

[facility]ity of



             pressure                                            [facility]

 

             Diastolic blood 2020 20:49:00 70 mm[Hg]                 [facility]

rsity of



             pressure                                            [facility]

 

             Heart rate   2020 20:49:00 72 /min                   [facility]

[facility] of



                                                                 [facility]

 

             Body temperature 2020 20:49:00 36.11 [place]                 [facility]

ers[facility] of



                                                                 [facility]

 

             Respiratory rate 2020 20:49:00 16 /min                   [facility]

ersity of



                                                                 [facility]

 

             Body height  2020 20:49:00 157.5 cm                  Universi

ty of



                                                                 [facility]

 

             Body weight  2020 20:49:00 63.504 kg                 Universi

ty of



                                                                 [facility]

 

             BMI          2020 20:49:00 25.61 kg/m2               Universi

ty [facility]







Procedures







                Procedure       Date / Time     Performing Clinician Source



                                Performed                       

 

                EXTERNAL PROVIDER 2020 05:01:00 Doctor Unassigned, No Univ

[facility]



                RECORDS                         Name            Medical Branch

 

                CAROTID DUPLEX BILATERAL 2020-04-10 15:55:59 Jovanny Aguilar

 [facility]



                BY VASCULAR LAB                                 [facility]

 

                CT THORAX WO CONTRAST 2020-04-10 13:33:48 Gavino Cadet

sity [facility]

 

                CT HEAD WO CONTRAST 2020-04-10 13:33:14 Gavino Cadet  Universi

ty [facility]

 

                TROPONIN I      2020-04-10 11:27:00 Chichi [facility]

 

                MAGNESIUM       2020-04-10 08:49:00 Chichi [facility]

 

                TROPONIN I      2020-04-10 08:49:00 Chichi [facility]

 

                BASIC METABOLIC PANEL 2020-04-10 08:49:00 Chichi [facility]



                (NA, K, CL, CO2,                                 Medical Branch



                GLUCOSE, BUN,                                   



                CREATININE, CA)                                 

 

                CBC WITH DIFFERENTIAL 2020-04-10 08:49:00 Chichi [facility]

 

                N-TERMINAL PRO-BNP 2020-04-10 08:49:00 Chichi [facility]

 

                TROPONIN I      2020-04-10 06:13:00 Chichi [facility]

 

                PROCALCITONIN   2020-04-10 06:13:00 Chichi [facility]

 

                CORONAVIRUS COVID-19 2020-04-10 01:36:00 Tahira Pearce Uni

[facility] [place]



                TESTING                                         [facility]

 

                URINALYSIS      2020-04-10 01:35:00 [facility]

 

                PHOSPHORUS      2020-04-10 00:24:00 Chichi [facility]

 

                CREATINE KINASE 2020-04-10 00:24:00 Chichi [facility]

 

                MAGNESIUM       2020-04-10 00:24:00 Chichi [facility]

 

                TROPONIN I      2020-04-10 00:24:00 Nancy Mercedes  [facility]

 

                THYROID STIMULATING 2020-04-10 00:24:00 Chichi [facility]



                HORMONE                                         [facility]

 

                COMP. METABOLIC PANEL 2020-04-10 00:24:00 Nancy Mercedes  [facility]



                ([id number])                                         Medical Branch

 

                CBC WITH DIFFERENTIAL 2020-04-10 00:24:00 Nancy Mercedes  [facility]

 

                N-TERMINAL PRO-BNP 2020-04-10 00:24:00 Chichi [facility]

 

                EKG-12 LEAD     2020-04-10 00:23:02 Doctor Unassigned, No [facility]



                                                Name            Medical Branch

 

                XR CHEST 1 VW   2020-04-10 00:22:47 [facility] Mercedes  [facility]

 

                EKG-12 LEAD     2020-04-10 00:11:24 Nancy Mercedes  [facility]

 

                ND LAYR CLOS WND 2020 20:59:00 Nancy Mercedes  [facility]



                TRUNK,ARM,LEG <2.5 CM                                 Medical Br

anch

 

                CT CERVICAL SPINE WO 2020 19:42:47 Nancy Mercedes  [facility]



                CONTRAST                                        [facility]

 

                CT HEAD WO CONTRAST 2020 19:42:47 Nancy Mercedes  [facility]

 

                CONSENT/REFUSAL FOR 2020 18:09:03 Doctor [facility]igned[facility]



                DIAGNOSIS AND TREATMENT                 Name            Medical 

Branch

 

                XR FOOT <3 VW LEFT 2020 16:40:48 Chip Servin  [facility]

 

                URINALYSIS      2020 16:11:00 [facility]

 

                CT CERVICAL SPINE WO 2020 16:02:43 Chip Servin  [facility]



                CONTRAST                                        [facility]

 

                CT HEAD WO CONTRAST 2020 16:02:43 Chip Servin  [facility]

 

                XR CHEST 1 VW   2020 15:39:49 Chip Servin  [facility]

 

                XR FOOT  3+ VW LEFT 2020 15:39:49 Chip Servin  [facility]

 

                TROPONIN I      2020 15:19:00 Chip Servin  [facility]

 

                COMP. METABOLIC PANEL 2020 15:19:00 Chip Servin  [facility]



                ([id number])                                         [facility]

 

                CBC WITH DIFFERENTIAL 2020 15:19:00 Chip Servin  [facility]

 

                PROTHROMBIN TIME / INR 2020 15:19:00 Chip Servin  [facility]

 

                ACTIVATED PARTIAL 2020 15:19:00 Chip Servin  [facility]



                [facility]

 

                EKG-12 LEAD     2020 15:14:45 [facility]in  [facility]

 

                HOME HEALTH - OTHER 2020 05:01:00 Doctor [facility]assigned, No [facility]

 

                CBC WITH DIFFERENTIAL 2020 14:16:00 Vito Hernandez [facility]

 

                BASIC METABOLIC PANEL 2020 11:47:00 Vito Hernandez [facility]



                (NA, K, CL, CO2,                                 Medical Branch



                GLUCOSE, BUN,                                   



                CREATININE, CA)                                 

 

                MAGNESIUM       2020 09:09:00 Vito Hernandez [facility]

 

                TROPONIN I      2020 09:09:00 Nancy Gruber [facility]

 

                BASIC METABOLIC PANEL 2020 09:09:00 [facility]



                (NA, K, CL, CO2,                                 Medical Branch



                GLUCOSE, BUN,                                   



                CREATININE, CA)                                 

 

                CBC WITH DIFFERENTIAL 2020 09:09:00 [facility]

 

                TROPONIN I      2020 05:26:00 Nancy Gruber [facility]

 

                VITAMIN B12, LEVEL 2020 01:48:00 Nancy Gruber [facility]

 

                IRON PANEL      2020 01:48:00 Nancy Gruber [facility]

 

                FERRITIN SERUM  2020 00:07:00 Nancy Gruber [facility]

 

                TROPONIN I      2020 00:07:00 Nancy Gruber [facility]

 

                ECHO ROUTINE W/DOPPLER 2020 22:23:04 [facility]

[facility]

 

                URINALYSIS      2020 18:21:00 TANIYA [facility]aser [facility]

 

                XR CHEST 1 VW   2020 18:10:19 TANIYA Glaser [facility]

 

                TROPONIN I      2020 17:55:00 TANIYA Glaser [facility]

 

                THYROID STIMULATING 2020 17:55:00 [facility]



                HORMONE                                         [facility]

 

                COMP. METABOLIC PANEL 2020 17:55:00 TANIYA Glaser [facility]

[facility] [place]



                ([id number])                                         Medical Branch

 

                LIPID PANEL     2020 17:55:00 Nancy Gruber [facility]



                ([id number])(TOTAL                                   Medical Branch



                CHOLESTEROL,                                    



                TRIGLYCERIDES, HDL)                                 

 

                CBC WITH DIFFERENTIAL 2020 17:55:00 TANIYA Glaser [facility]

 

                GLYCOSYLATED HEMOGLOBIN 2020 17:55:00 Nancy Gruber 

[facility]



                (A1C)                                           [facility]

 

                EKG-12 LEAD     2020 17:33:35 TANIYA Glaser [facility] o

f [facility]

 

                EKG-12 LEAD     2020 16:25:54 Sameera Ramires [facility]

 

                NOTICE OF PRIVACY 2020 16:04:54 Doctor Unassigned, No [facility]



                PRACTICES                       Name            [facility]

 

                CONSENT/REFUSAL FOR 2020 16:04:37 Doctor Unassigned, No [facility]



                DIAGNOSIS AND TREATMENT                 [facility]

 

                COGNITIVE ASSESSMENT 2020 06:01:00 Doctor Unassigned, No [facility]

[facility]







Encounters







        Start   End     Encounter Admission Attending Care    Care    Encounter 

Source



        Date/Time Date/Time Type    Type    Clinicians Facility Department ID   

   

 

        2020 Orders          Doctor HURT    1.2.840.114 [id number]

11 Univers



        00:00:00 00:00:00 Only            UnassignedREVA   350.1.13.10       

  ity of



                                        [facility] 4.2.7.2.686         James

as



                                                        [phone]         Medi

alma



                                                        [address]             Branch

 

        2020 Orders          Doctor HURT    1.2.840.114 [id number]

11 



        00:00:00 00:00:00 Only            UnassignedREVA   350.1.13.10       

  



                                        [facility] 4.2.7.2.686         



                                                        [phone]         



                                                        009             

 

        2020 Telephone         LISA Aguero    1.2.840.114 [id number]

[id number] Univers



        00:00:00 00:00:00                 [place] 350.1.13.10         [facility] 4.2.7.2.686         [place]

[place]



                                                [place] [phone]         Me

dical



                                                [facility]     [address]             [facility]                 

 

        2020 Telephone         Laci [facility]    1.2.840.114 [id number]

[id number] 



        00:00:00 00:00:00                 [place]  Padmini 350.1.13.10         



                                                [place] 4.2.7.2.686         



                                                Professio [phone]         



                                                [address]                 

 

        2020 2020-04-10 Emergency         Nancy Mercedes S [facility]    1.2.840.1

14 [id number] 

Univers



        13:22:30 19:51:00                 Tahira Pearce 350.1.13.

10         ity of



                                        ChichiGavino miles 4.2.7.2.686      

   [facility]  [phone]         [address]

 

        2020 2020-04-10 Outpatient X       CHICHI [facility]     [id number]

[id number] Univers



        13:22:30 19:51:00                 ADNAN                           ity [facility]

 

        2020 2020-04-10 Emergency         Nancy Mercedes [facility]    1.2.840.1

14 [id number] 



        13:22:30 19:51:00                 Tahira Pearce 350.1.13.

10         



                                        Gavino Cadet 4.2.7.2.686      

   



                                                [place]  [phone]         



                                                        [facility]             

 

        2020-04-10 2020-04-10 Telephone         Yenifer [facility]    [id number]

[id number] Univers



        00:00:00 00:00:00                 [facility]  350.1.13.10         it

y of



                                                [place] 4.2.7.2.686         James

as



                                                Professio [phone]         [facility]     [address]



                                                Office                  



                                                Building                 



                                                One                     

 

        2020-04-10 2020-04-10 Telephone         Cannon, [facility]    1.2.010.213 [id number]

[id number] 



        00:00:00 00:00:00                 [facility]  350.1.13.10         



                                                [place] 4.2.7.2.686         



                                                Professio [phone]         



                                                nal     [facility]             



                                                Office                  



                                                Building                 



                                                One                     

 

        2020 Refill          Yenifer [facility]    1.2.840.114 [id number]

36 Univers



        00:00:00 00:00:00                 [facility]  350.1.13.10         it

y of



                                                [place] 4.2.7.2.686         James

as



                                                Professio [phone]         Me

dical



                                                [facility]     [address]



                                                Office                  



                                                Building                 



                                                One                     

 

        2020 Refill          [facility]    1.2.840.114 [id number]

36 



        00:00:00 00:00:00                 [facility]  350.1.13.10         



                                                [place] 4.2.7.2.686         



                                                Professio [phone]         



                                                nal     [facility]             



                                                Office                  



                                                Building                 



                                                One                     

 

        2020 Emergency         Malathi,  [facility]    1.2.005.373 [id number]

[id number] Univers



        10:09:32 13:42:00                 [place] 350.1.13.10         i

ty of



                                                [place] 4.2.7.2.686         [place]

[place]



                                                [place]  [phone]         [address]

 

        2020 Emergency X       MALATHI,  [facility]    ERT     [id number]

[id number] Univers



        10:09:32 13:42:00                 CHIP                           ity [facility]

 

        2020 Emergency         Malathi,  [facility]    1.2.416.323 [id number]

[id number] 



        10:09:32 13:42:00                 [place] 350.1.13.10         



                                                [place] 4.2.7.2.686         



                                                [place]  [phone]         



                                                        4             

 

        2020 [facility]       [facility]    [id number]

-20 Univers



        13:15:00 13:15:00                 EDNA                 [id number]  ity [facility]

 

        2020 Telephone         [facility]    1.2.840.114 [id number]

[id number] Univers



        00:00:00 00:00:00                 Darnell [place] 350.1.13.10      

   ity of



                                                [place] 4.2.7.2.686         [place]

[place]



                                                [place] [phone]         Me

dical



                                                [facility]     [address]             [facility]                 

 

        2020 Telephone         [facility]    1.2.840.114 [id number]

[id number] 



        00:00:00 00:00:00                 Darnell [place] 350.1.13.10      

   



                                                [place] 4.2.7.2.686         



                                                Professio [phone]         



                                                nal     [address]             



                                                Building                 

 

        2020 Orders          Doctor  LICHA    1.2.840.114 [id number]

17 Univers



        00:00:00 00:00:00 Only            Unassigned, REVA   350.1.13.10       

  ity of



                                        [facility] 4.2.7.2.686         James

as



                                                        [phone]         Medi

alma



                                                        [address]

 

        2020 Orders          Doctor  LICHA    1.2.840.114 [id number]

17 



        00:00:00 00:00:00 Only            Unassigned, REVA   350.1.13.10       

  



                                        [facility] 4.2.7.2.686         



                                                        [phone]         



                                                        [facility]             

 

        2020 Transition         Gordon Tsang  1.2.840.114 [id number]

[id number] 



        00:00:00 00:00:00 of Care         Marcia L Palmer   350.1.13.10         



                                                [place]   4.2.7.2.686         



                                                        [phone]         



                                                        [facility]             

 

        2020 Telephone         [facility]    1.2.883.785 [id number]

[id number] 



        00:00:00 00:00:00                 [facility]  350.1.13.10         



                                                [place] 4.2.7.2.686         



                                                Professio [phone]         



                                                nal     [address]             



                                                Office                  



                                                Building                 



                                                One                     

 

        2020 Transition         Gordon Tsang  1.2.840.114 [id number]

[id number] Univers



        00:00:00 00:00:00 of Care         Marcia L Palmer   350.1.13.10         i

ty of



                                                [place]   4.2.7.2.686         [place]

[place]



                                                        [phone]         Medi

alma



                                                        [address]             [place]

 

        2020 Telephone         [facility]    [id number] [id number]

[id number] Univers



        00:00:00 00:00:00                 [facility]  350.1.13.10         it

y of



                                                [place] 4.2.7.2.686         James

as



                                                Professio [phone]         Me

dical



                                                [facility]     [address]             Branch



                                                Office                  



                                                Building                 



                                                One                     

 

        2020 Emergency         Alfredito, K Margaret [facility]    1.2.840.

114 [id number] 



        10:14:00 13:15:00                 Viot Hernandez 350.1.13.10  

       



                                                [place] 4.2.7.2.686         



                                                [place]  [phone]         



                                                        [facility]             

 

        2020 Emergency         TANIYA Glaser [facility]    1.2.840.

114 [id number] 

Univers



        10:14:00 13:15:00                 Vito Hernandze 350.1.13.10  

       ity of



                                                [place] 4.2.7.2.686         [place]

[place]



                                                [place]  [phone]         [address]

 

        2020 Outpatient X       KECIA [facility]     [id number]

[id number] Univers



        10:14:00 13:15:00                 VITO valdovinos [facility]

 

        2020 Technician         2, Adc Lab [facility]    1.2.840.114 

[id number] Univers



        15:55:30 16:10:30 Visit           Darnell Rodriguez 350.1.13

.10         ity of



                                                [place] 4.2.7.2.686         [place]

[place]



                                                [place]fess[place] [phone]         Me

dical



                                                nal     [address]             [facility]                 

 

        2020 Office          [facility]    1.2.840.114 [id number]

548 



        14:22:34 15:53:57 Visit           Darnell [place] 350.1.13.10      

   



                                                [place] 4.2.7.2.686         



                                                Professio [phone]         



                                                [address]                 

 

        2020 Office          [facility]    1.2.840.114 [id number]

548 [facility]



        14:22:34 15:53:57 Visit           Darnell [place] 350.1.13.10      

   ity of



                                                [place] 4.2.7.2.686         [place]

[place]



                                                Profess[place] [phone]         Me

dical



                                                nal     [address] [place] [address]                 

 

        2020 Orders          Doctor HURT    1.2.840.114 [id number]

18 



        00:00:00 00:00:00 Only            Unassigned, REVA   350.1.13.10       

  



                                        [facility] 4.2.7.2.686         



                                                        [phone]         



                                                        009             

 

        2020 Orders          Doctor  LICHA    1.2.840.114 [id number]

18 Univers



        00:00:00 00:00:00 Only            Unassigned, REVA   350.1.13.10       

  ity of



                                        [facility] 4.2.7.2.686         James

as



                                                        [phone]         Medi

alma



                                                        [address]             Branch

 

        2019 Refradha Cannon [facility]    1.2.840.114 [id number]

44 Univers



        00:00:00 00:00:00                 [facility]  350.1.13.10         it

y of



                                                [place] 4.2.7.2.686         James

as



                                                [place] [phone]         Me

dical



                                                nal     044             [facility]



                                                Office                  



                                                Building                 



                                                One                     







Results







           Test Description Test Time  Test       Results    Result     Source



                                 Comments              Comments   

 

           CT THORAX WO 2020             1. Type IV            [facility]

f



           CONTRAST   0                     diaphragmatic hernia            [facility]



                      14:53:54              with herniation of            [facility]



                                            stomach small bowel            



                                            aswell as part of the            



                                            transverse colon            



                                            through the hernia            



                                            defect. This            



                                            hasresulted in            



                                            atelectasis of the            



                                            left lower lung.            



                                            EXAM: CT scan of the            



                                            chest without contrast            



                                            HISTORY: ?Dyspnea,            



                                            chronic, neg or            



                                            nondiagnostic xray            



                                            Shortness of            



                                            breathleft opacity;            



                                            TECHNIQUE:3 mm axial            



                                            images are obtained            



                                            from lung apices to            



                                            the adrenalglands            



                                            without intravenous            



                                            contrast. Sagittal and            



                                            coronal reformation            



                                            iscarried out.            



                                            COMPARISON: Chest            



                                            x-ray from 2020            



                                            Radiation Dose: DLP of            



                                            256 mGy-cm.            



                                            FINDINGS:The            



                                            examination is            



                                            somewhat limited in            



                                            quality secondary            



                                            torespiratory motion.            



                                            Mild centrilobular            



                                            emphysematous changes            



                                            are seen.Areas of            



                                            atelectasis are seen            



                                            involving the left            



                                            lower lobe. No            



                                            focalconsolidation is            



                                            identified. The            



                                            central airway appears            



                                            to be normal. Mild            



                                            mucous plugging is            



                                            seeninvolving the left            



                                            lower lobe bronchi. No            



                                            pleural effusion is            



                                            seen. Mild pleural            



                                            thickening is seen            



                                            involving thelung            



                                            bases. A calcified            



                                            pleural plaque is seen            



                                            in the right lower            



                                            lobe. Heart size is            



                                            normal. Scattered            



                                            coronary artery            



                                            calcifications are            



                                            seen. Nopericardial            



                                            effusion is noted.            



                                            Classic three-vessel            



                                            arch anatomy is            



                                            seen.Thoracic aorta is            



                                            significantly            



                                            tortuous. Pulmonary            



                                            artery appears to            



                                            benormal in size. No            



                                            enlarged lymph nodes            



                                            are seen in the            



                                            mediastinum. A large            



                                            hiatal hernia is seen            



                                            with herniation of            



                                            most of the stomach,            



                                            smallbowel and            



                                            transverse colon. The            



                                            adrenal glands are            



                                            normal. A             



                                            well-circumscribed            



                                            low-density lesion            



                                            ispartially visualized            



                                            in the left kidney            



                                            consistent with a            



                                            cyst. Gallbladderis            



                                            removed. Changes of            



                                            spondylosis are seen            



                                            in the thoracic spine.            



                                            [facility], Radiant Results            



                                            Inft User - 04/10/2020            



                                             9:55 AM CDTEXAM: CT            



                                            scan of the chest            



                                            without               



                                            contrastHISTORY:            



                                            Dyspnea, chronic, neg            



                                            or nondiagnostic xray            



                                            Shortness of            



                                            breathleft            



                                            opacity;TECHNIQUE:3 mm            



                                            axial images are            



                                            obtained from lung            



                                            apices to the            



                                            adrenalglands without            



                                            intravenous contrast.            



                                            Sagittal and coronal            



                                            reformation iscarried            



                                            out.COMPARISON: Chest            



                                            x-ray from            



                                            2020Radiation            



                                            Dose: DLP of 256            



                                            mGy-cm. FINDINGS:The            



                                            examination is            



                                            somewhat limited in            



                                            quality secondary            



                                            torespiratory motion.            



                                            Mild centrilobular            



                                            emphysematous changes            



                                            are seen.Areas of            



                                            atelectasis are seen            



                                            involving the left            



                                            lower lobe. No            



                                            focalconsolidation is            



                                            identified.The central            



                                            airway appears to be            



                                            normal. Mild mucous            



                                            plugging is            



                                            seeninvolving the left            



                                            lower lobe bronchi.No            



                                            pleural effusion is            



                                            seen. Mild pleural            



                                            thickening is seen            



                                            involving thelung            



                                            bases. A calcified            



                                            pleural plaque is seen            



                                            in the right lower            



                                            lobe.Heart size is            



                                            normal. Scattered            



                                            coronary artery            



                                            calcifications are            



                                            seen. Nopericardial            



                                            effusion is noted.            



                                            Classic three-vessel            



                                            arch anatomy is            



                                            seen.Thoracic aorta is            



                                            significantly            



                                            tortuous. Pulmonary            



                                            artery appears to            



                                            benormal in size.No            



                                            enlarged lymph nodes            



                                            are seen in the            



                                            mediastinum.A large            



                                            hiatal hernia is seen            



                                            with herniation of            



                                            most of the stomach,            



                                            smallbowel and            



                                            transverse colon.The            



                                            adrenal glands are            



                                            normal. A             



                                            well-circumscribed            



                                            low-density lesion            



                                            ispartially visualized            



                                            in the left kidney            



                                            consistent with a            



                                            cyst. Gallbladderis            



                                            removed.Changes of            



                                            spondylosis are seen            



                                            in the thoracic            



                                            spine.IMPRESSION1.            



                                            Type IV diaphragmatic            



                                            hernia with herniation            



                                            of stomach small bowel            



                                            aswell as part of the            



                                            transverse colon            



                                            through the hernia            



                                            defect. This            



                                            hasresulted in            



                                            atelectasis of the            



                                            left lower lung.            

 

           CT HEAD WO 2020-              Large scalp hematoma            [facility]

versity of



           CONTRAST   0                     along the vertex on            [facility]



                      14:25:11              the right with            [facility]



                                            interval decreasein            



                                            size in comparison to            



                                            the prior exam            



                                            currently measuring            



                                            1.3 cm inthickness and            



                                            4.9 cm in length. A            



                                            0.4 cm hyperdense            



                                            focus seen in along            



                                            theskin at the site of            



                                            the hematoma            



                                            inferiorly and may            



                                            represent foreign            



                                            body,correlate with            



                                            physical exam.  Motion            



                                            degradation            



                                            inferiorly. No gross            



                                            acute intracranial            



                                            hemorrhage ormass            



                                            effect.  Preliminary            



                                            Report Dictated by            



                                            Resident: Annie Terry            



                                            ?MD Andrew., have            



                                            reviewed this study            



                                            and agree with            



                                            theabove report.CT            



                                            HEAD WO CONTRAST            



                                            HISTORY: 74-year-old            



                                            female with PMH of            



                                            dementia and atrial            



                                            fibrillationand            



                                            history of frequent            



                                            falls, to be evaluated            



                                            after head trauma            



                                            andpossible            



                                            convulsions.            



                                            COMPARISON: CT head            



                                            without contrast            



                                            2020 TECHNIQUE:            



                                            ?Noncontrast CT            



                                            imaging of the head            



                                            was performed and            



                                            coronaland sagittal            



                                            reconstructions were            



                                            obtained and reviewed.            



                                            FINDINGS: Mild motion            



                                            degradation            



                                            inferiorly.  Large            



                                            scalp hematoma is            



                                            noted along the vertex            



                                            and to the right,            



                                            hasdecreased in size            



                                            from 2.1 x 4.2 x 5.6            



                                            cm (AP by TV by CC) to            



                                            1.3 x 4.4 x4.9 cm (AP            



                                            by TV by CC). 4 mm            



                                            hyperdensity inferior            



                                            aspect of the            



                                            scalphematoma in the            



                                            dermis may reflect a            



                                            foreign body,            



                                            correlate with            



                                            physicalexam.            



                                            Unchanged mildly            



                                            prominent lateral and            



                                            third ventricles            



                                            likely secondary            



                                            tovolume loss. ?No            



                                            ?midline shift or            



                                            pathological            



                                            extra-axial            



                                            fluidcollection is            



                                            present. The basal            



                                            cisterns are            



                                            unremarkable. There is            



                                            no gross acute            



                                            intracranial            



                                            hemorrhage or            



                                            significant mass            



                                            effect.Scattered            



                                            periventricular and            



                                            deep white matter            



                                            hypoattenuating            



                                            foci,nonspecific, can            



                                            be seen with chronic            



                                            small vessel disease.            



                                            Bilateral             



                                            basalganglial            



                                            calcifications are            



                                            noted. The gray-white            



                                            matter differentiation            



                                            ispreserved. The            



                                            mastoid air cells and            



                                            paranasal air sinuses            



                                            are not well evaluated            



                                            dueto motion artifact.            



                                            No definite calvarial            



                                            fracture.  [facility],            



                                            Radiant Results [facility]            



                                            User - 04/10/2020            



                                            9:26 AM CDTCT HEAD WO            



                                            CONTRASTHISTORY:            



                                            74-year-old female            



                                            with PMH of dementia            



                                            and atrial            



                                            fibrillationand            



                                            history of frequent            



                                            falls, to be evaluated            



                                            after head trauma            



                                            andpossible            



                                            convulsions.COMPARISON            



                                            : CT head without            



                                            contrast              



                                            2020TECHNIQUE:            



                                            Noncontrast CT imaging            



                                            of the head was            



                                            performed and            



                                            coronaland sagittal            



                                            reconstructions were            



                                            obtained and            



                                            reviewed.FINDINGS:Mild            



                                            motion degradation            



                                            inferiorly. Large            



                                            scalp hematoma is            



                                            noted along the vertex            



                                            and to the right,            



                                            hasdecreased in size            



                                            from 2.1 x 4.2 x 5.6            



                                            cm (AP by TV by CC) to            



                                            1.3 x 4.4 x4.9 cm (AP            



                                            by TV by CC). 4 mm            



                                            hyperdensity inferior            



                                            aspect of the            



                                            scalphematoma in the            



                                            dermis may reflect a            



                                            foreign body,            



                                            correlate with            



                                            physicalexam.            



                                            Unchanged mildly            



                                            prominent lateral and            



                                            third ventricles            



                                            likely secondary            



                                            tovolume loss.  No            



                                            midline shift or            



                                            pathological            



                                            extra-axial            



                                            fluidcollection is            



                                            present. The basal            



                                            cisterns are            



                                            unremarkable.There is            



                                            no gross acute            



                                            intracranial            



                                            hemorrhage or            



                                            significant mass            



                                            effect.Scattered            



                                            periventricular and            



                                            deep white matter            



                                            hypoattenuating            



                                            foci,nonspecific, can            



                                            be seen with chronic            



                                            small vessel disease.            



                                            Bilateral             



                                            basalganglial            



                                            calcifications are            



                                            noted. The gray-white            



                                            matter differentiation            



                                            ispreserved.The            



                                            mastoid air cells and            



                                            paranasal air sinuses            



                                            are not well evaluated            



                                            dueto motion artifact.            



                                            No definite calvarial            



                                            fracture.             



                                            IMPRESSIONLarge scalp            



                                            hematoma along the            



                                            vertex on the right            



                                            with interval            



                                            decreasein size in            



                                            comparison to the            



                                            prior exam currently            



                                            measuring 1.3 cm            



                                            inthickness and 4.9 cm            



                                            in length. A 0.4 cm            



                                            hyperdense focus seen            



                                            in along theskin at            



                                            the site of the            



                                            hematoma inferiorly            



                                            and may represent            



                                            foreign body,correlate            



                                            with physical exam.            



                                            Motion degradation            



                                            inferiorly. No gross            



                                            acute intracranial            



                                            hemorrhage ormass            



                                            effect. Preliminary            



                                            Report Dictated by            



                                            Resident: Annie Pang MD., have            



                                            reviewed this study            



                                            and agree with            



                                            theabove report.            









                    TROPONIN I          2020-04-10 13:37:00 









                      Test Item  Value      Reference Range Interpretation Comme

nts









             TROPONIN I (test code = <0.012       See_Comment                [Au

tomated message] The



             [id number])                                         system which 

nerated



                                                                 this result tra

nsmitted



                                                                 reference range

: <=0.034



                                                                 ng/mL. The refe

rence



                                                                 range was not u

sed to



                                                                 interpret this 

result as



                                                                 normal/abnormal

.

 

             LIANE (test code = LIANE) Equal or Less than                           



                          0.034 ng/ml---Normal                           



                          ?Note: Cardiac                           



                          troponin begins to                           



                          rise 3-4 hours after                           



                          the onset of ischemia.                           



                          Repeat in 4-6 hours if                           



                          the sample was drawn                           



                          within 3-4 hours of                           



                          the onset of the                           



                          symptom and found                           



                          normal.  Between 0.035                           



                          and 0.120 ng/mL---                           



                          Borderline.                            



                          Questionable                           



                          myocardial injury or                           



                          necrosis ? ?Note:                           



                          Serial measurement may                           



                          be necessary to                           



                          confirm or exclude the                           



                          diagnosis of                           



                          myocardial injury or                           



                          necrosis; Clinical                           



                          correlation (symptoms,                           



                          EKGs, imaging studies,                           



                          and others) required;                           



                          Repeat in 4-6 hours if                           



                          clinically indicated.                           



                          ? ? ? ? Equal or                           



                          Higher than 0.121                           



                          ng/mL---Abnormal.                           



                          Myocardial Injury or                           



                          Necrosis Likely ? ? ?                           



                          ?  Biotin has been                           



                          reported to cause a                           



                          negative bias,                           



                          interpret results                           



                          relative to patient's                           



                          use of biotin. ? ? ? ?                           



                          ? ? ? ? ? ? ? ? ? ? ?                           



                          ? ? ? ? ? ? ?  ? ? ? ?                           



                          ? ? ? ? ? ? ? ? ? ? ?                           



                          ? ? ? ? ? ? ? ? ? ? ?                           



                          ? ?                                    

 

             Lab Interpretation (test Normal                                 



             code = 15836-0)                                        



[facility]





             Test Item    Value        Reference Range Interpretation Comments

 

             TROPONIN I (test <0.012       See_Comment                [Automated



             code = [id number])                                        message] 

The



                                                                 system which



                                                                 generated this



                                                                 result



                                                                 transmitted



                                                                 reference range

:



                                                                 <=0.034 ng/mL.



                                                                 The reference



                                                                 range was not



                                                                 used to interpr

et



                                                                 this result as



                                                                 normal/abnormal

.

 

             LIANE (test code = Equal or Less than                           



             LIANE)         0.034                                  



                          ng/ml---Normal                           



                          ?Note: Cardiac                           



                          troponin begins to                           



                          rise 3-4 hours                           



                          after the onset of                           



                          ischemia. Repeat                           



                          in 4-6 hours if                           



                          the sample was                           



                          drawn within 3-4                           



                          hours of the onset                           



                          of the symptom and                           



                          found normal.                           



                          Between 0.035 and                           



                          0.120 ng/mL---                           



                          Borderline.                            



                          Questionable                           



                          myocardial injury                           



                          or necrosis ?                           



                          ?Note: Serial                           



                          measurement may be                           



                          necessary to                           



                          confirm or exclude                           



                          the diagnosis of                           



                          myocardial injury                           



                          or necrosis;                           



                          Clinical                               



                          correlation                            



                          (symptoms, EKGs,                           



                          imaging studies,                           



                          and others)                            



                          required; Repeat                           



                          in 4-6 hours if                           



                          clinically                             



                          indicated. ? ? ? ?                           



                          Equal or Higher                           



                          than 0.121                             



                          ng/mL---Abnormal.                           



                          Myocardial Injury                           



                          or Necrosis Likely                           



                          ? ? ? ?  Biotin                           



                          has been reported                           



                          to cause a                             



                          negative bias,                           



                          interpret results                           



                          relative to                            



                          patient's use of                           



                          biotin. ? ? ? ? ?                           



                          ? ? ? ? ? ? ? ? ?                           



                          ? ? ? ? ? ? ? ?  ?                           



                          ? ? ? ? ? ? ? ? ?                           



                          ? ? ? ? ? ? ? ? ?                           



                          ? ? ? ? ? ? ? ? ?                           

 

             Lab Interpretation Normal                                 



             (test code =                                        



             90059-1)                                            



[facility]





             Test Item    Value        Reference Range Interpretation Comments

 

             Procalcitonin (test 0.02 ng/mL   <0.07                     



             code = [id number])                                        

 

             LIANE (test code = LIANE) INTERPRETATION OF                           



                          PROCALCITONIN RESULTS IN                           



                          ADULTS >= 18 YEARS OF                           



                          AGE Initiation and                           



                          discontinuation of                           



                          antibiotics on patients                           



                          with suspected or                           



                          confirmed Lower                           



                          Respiratory Tract                           



                          Infection in Adults >=                           



                          18 years of age.                           



                          +--------------+--------                           



                          --------+---------------                           



                          +-----------------------                           



                          -----+|Procalcitonin                           



                          |Interpretation                           



                          ?|Antibiotic ? ?                           



                          |Considerations ? ? ? ?                           



                          ? ? ? |ng/mL ? ? ? ? | ?                           



                          ? ? ? ? ? ?                            



                          ?|recommendation | ? ? ?                           



                          ? ? ? ? ? ? ? ? ? ? ?                           



                          +--------------+--------                           



                          --------+---------------                           



                          +-----------------------                           



                          -----+| <0.1 ? ? ? ? |                           



                          Bacterial ? ? ?|                           



                          Strongly ? ? ?| ? ? ? ?                           



                          ? ? ? ? ? ? ? ? ? ? | ?                           



                          ? ? ? ? ? ?| infection                           



                          very | discouraged ? |                           



                          Overruling: ? ? ? ? ? ?                           



                          ? ? | ? ? ? ? ? ? ?|                           



                          unlikely ? ? ? | ? ? ? ?                           



                          ? ? ? | ? Clinically                           



                          unstable ? ? ?                           



                          +--------------+--------                           



                          --------+---------------                           



                          + ? High risk for                           



                          adverse ? ? | <0.25 ? ?                           



                          ? ?| Bacterial ? ? ?|                           



                          Discouraged ? | ?                           



                          outcome ? ? ? ? ? ? ? ?                           



                          ? | ? ? ? ? ? ? ?|                           



                          infection ? ? ?| ? ? ? ?                           



                          ? ? ? | ? SEE IMPORTANT                           



                          NOTE ? ? ? ?| ? ? ? ? ?                           



                          ? ?| unlikely ? ? ? | ?                           



                          ? ? ? ? ? ? | ? ? ? ? ?                           



                          ? ? ? ? ? ? ? ? ?                           



                          +--------------+--------                           



                          --------+---------------                           



                          +-----------------------                           



                          -----+| >=0.25 ? ? ? |                           



                          Bacterial ? ? ?|                           



                          Encouraged ? ?| ? ? ? ?                           



                          ? ? ? ? ? ? ? ? ? ? | ?                           



                          ? ? ? ? ? ?| infection ?                           



                          ? ?| ? ? ? ? ? ? ? | ? ?                           



                          ? ? ? ? ? ? ? ? ? ? ? ?                           



                          | ? ? ? ? ? ? ?| likely                           



                          ? ? ? ? | ? ? ? ? ? ? ?                           



                          | Consider treatment                           



                          failure                                



                          ?+--------------+-------                           



                          ---------+--------------                           



                          -+ if levels does not                           



                          decrease | >0.5 ? ? ? ?                           



                          | Bacterial ? ? ?|                           



                          Strongly ? ? ?|                           



                          appropriately ? ? ? ? ?                           



                          ? ? | ? ? ? ? ? ? ?|                           



                          infection very |                           



                          encouraged ? ?| ? ? ? ?                           



                          ? ? ? ? ? ? ? ? ? ? | ?                           



                          ? ? ? ? ? ?| likely ? ?                           



                          ? ? | ? ? ? ? ? ? ? | ?                           



                          ? ? ? ? ? ? ? ? ? ? ? ?                           



                          ?                                      



                          +--------------+--------                           



                          --------+---------------                           



                          +-----------------------                           



                          -----+ Discontinuation                           



                          of antibiotics in                           



                          high-acuity patients                           



                          with suspected or                           



                          confirmed sepsis in                           



                          Adults >= 18 years of                           



                          age.                                   



                          +--------------+--------                           



                          --------+---------------                           



                          +-----------------------                           



                          -----+|Procalcitonin                           



                          |Interpretation                           



                          ?|Antibiotic ? ?                           



                          |Considerations ? ? ? ?                           



                          ? ? ? |ng/mL ? ? ? ? | ?                           



                          ? ? ? ? ? ?                            



                          ?|recommendation | ? ? ?                           



                          ? ? ? ? ? ? ? ? ? ? ?                           



                          +--------------+--------                           



                          --------+---------------                           



                          +-----------------------                           



                          -----+| <0.25 ? ? ? ?|                           



                          Bacterial ? ? ?|                           



                          Strongly ? ? ?| ? ? ? ?                           



                          ? ? ? ? ? ? ? ? ? ? | ?                           



                          ? ? ? ? ? ?| infection                           



                          very | discouraged ? |                           



                          Overruling: ? ? ? ? ? ?                           



                          ? ? | ? ? ? ? ? ? ?|                           



                          unlikely ? ? ? | ? ? ? ?                           



                          ? ? ? | ? Clinically                           



                          unstable ? ? ?                           



                          +--------------+--------                           



                          --------+---------------                           



                          + ? High risk for                           



                          adverse ? ? | <0.5 or                           



                          drop | Bacterial ? ? ?|                           



                          Discouraged ? | ?                           



                          outcome ? ? ? ? ? ? ? ?                           



                          ? | >80% from ? ?|                           



                          infection ? ? ?| ? ? ? ?                           



                          ? ? ? | ? SEE IMPORTANT                           



                          NOTE ? ? ? ?| highest                           



                          PCT ?| unlikely ? ? ? |                           



                          ? ? ? ? ? ? ? | ? ? ? ?                           



                          ? ? ? ? ? ? ? ? ? ? |                           



                          level ? ? ? ?| ? ? ? ? ?                           



                          ? ? ?| ? ? ? ? ? ? ? | ?                           



                          ? ? ? ? ? ? ? ? ? ? ? ?                           



                          ?                                      



                          +--------------+--------                           



                          --------+---------------                           



                          +-----------------------                           



                          -----+| >=0.5 ? ? ? ?|                           



                          Bacterial ? ? ?|                           



                          Encouraged ? ?| ? ? ? ?                           



                          ? ? ? ? ? ? ? ? ? ? | ?                           



                          ? ? ? ? ? ?| infection ?                           



                          ? ?| ? ? ? ? ? ? ? | ? ?                           



                          ? ? ? ? ? ? ? ? ? ? ? ?                           



                          | ? ? ? ? ? ? ?| likely                           



                          ? ? ? ? | ? ? ? ? ? ? ?                           



                          | Consider treatment                           



                          failure                                



                          ?+--------------+-------                           



                          ---------+--------------                           



                          -+ if levels does not                           



                          decrease | >1.0 ? ? ? ?                           



                          | Bacterial ? ? ?|                           



                          Strongly ? ? ?|                           



                          appropriately ? ? ? ? ?                           



                          ? ? | ? ? ? ? ? ? ?|                           



                          infection very |                           



                          encouraged ? ?| ? ? ? ?                           



                          ? ? ? ? ? ? ? ? ? ? | ?                           



                          ? ? ? ? ? ?| likely ? ?                           



                          ? ? | ? ? ? ? ? ? ? | ?                           



                          ? ? ? ? ? ? ? ? ? ? ? ?                           



                          ?                                      



                          +--------------+--------                           



                          --------+---------------                           



                          +-----------------------                           



                          -----+ Percentage of                           



                          drop of Procalcitonin                           



                          calculation for                           



                          Discontinuation of                           



                          antibiotics in                           



                          high-acuity patients                           



                          with suspected or                           



                          confirmed sepsis in                           



                          Adults >= 18 years of                           



                          age.  ? ? ? ? ? ? ? ? ?                           



                          ? ? Procalcitonin                           



                          highest{}-Procalcitonin                           



                          current{}Delta                           



                          Procalcitonin =                           



                          ________________________                           



                          _______________________                           



                          x100%  ? ? ? ? ? ? ? ? ?                           



                          ? ? ? ? ? ? ?                           



                          Procalcitonin current {}                           



                          IMPORTANT NOTE:                           



                          Procalcitonin may be                           



                          elevated without                           



                          bacterial infection by                           



                          physiologic stress                           



                          related to trauma,                           



                          burns, chronic dialysis,                           



                          metastatic cancer,                           



                          surgery in the past                           



                          seven days, malaria,                           



                          some fungal infections,                           



                          and some forms of                           



                          vasculitis. The                           



                          interpretation algorithm                           



                          may not apply to                           



                          patients with                           



                          immunosuppression                           



                          (equivalent of >10 mg of                           



                          prednisone daily), HIV                           



                          with CD4 cell count <                           



                          350 cells/mm3, active                           



                          malignancy on systemic                           



                          chemotherapy, solid                           



                          organ transplant or                           



                          hematopoietic stem cell                           



                          transplantation, or                           



                          hospital acquired                           



                          pneumonia. Additionally,                           



                          some clinical trials of                           



                          procalcitonin have                           



                          excluded patients with                           



                          shock requiring                           



                          vasopressor use, acute                           



                          respiratory failure                           



                          requiring mechanical                           



                          ventilation, or those                           



                          with known lung                           



                          abscess/empyema. For                           



                          further information                           



                          please refer                           



                          to:[email or web address]                           



                          [email or web address]                           

 

             Lab Interpretation Normal                                 



             (test code = 42333-4)                                        



[facility]





             Test Item    Value        Reference Range Interpretation Comments

 

             NT-proBNP (test code 774 pg/mL    See_Comment  H             [Autom

ated



             = [id number])                                        message] The



                                                                 system which



                                                                 generated this



                                                                 result



                                                                 transmitted



                                                                 reference range

:



                                                                 <=450. The



                                                                 reference range



                                                                 was not used to



                                                                 interpret this



                                                                 result as



                                                                 normal/abnormal

.

 

             LIAEN (test code = LIANE) Biotin has been                           



                          reported to                            



                          cause a negative                           



                          bias, interpret                           



                          results relative                           



                          to patient's use                           



                          of biotin.                             

 

             Lab Interpretation Abnormal                               



             (test code = 74051-9)                                        



[facility]NA, K, CL, CO2, 
GLUCOSE, BUN, CREATININE, CA)2020-04-10 09:47:00





             Test Item    Value        Reference Range Interpretation Comments

 

             NA (test code = 137 mmol/L   135-145                   



             5420603091)                                         

 

             K (test code = 3.5 mmol/L   3.5-5                     



             9739557787)                                         

 

             CL (test code = 107 mmol/L                       



             5183642059)                                         

 

             CO2 TOTAL (test code = 25 mmol/L    23-31                     



             7605274054)                                         

 

             AGAP (test code =              2-16                      



             3679015958)                                         

 

             BUN (test code = 11 mg/dL     7-23                      



             9999431859)                                         

 

             GLUCOSE (test code = 94 mg/dL                         



             2879558123)                                         

 

             CREATININE (test code = 0.57 mg/dL   0.5-1.04                  



             7082000829)                                         

 

             CALCIUM (test code = 8.3 mg/dL    8.6-10.6     L            



             4559114255)                                         

 

             eGFR Calculation              mL/min/1.73m2              



             (Non-)                                        



             (test code =                                        



             5139958437)                                         

 

             eGFR Calculation              mL/min/1.73m2              



             (African American)                                        



             (test code =                                        



             3271223667)                                         

 

             LIANE (test code = LIANE) Association of                           



                          Glomerular Filtration                           



                          Rate (GFR) and Staging                           



                          of Kidney Disease*                           



                          +---------------------                           



                          --+-------------------                           



                          --+-------------------                           



                          ------+| GFR                           



                          (mL/min/1.73 m2) ?|                           



                          With Kidney Damage ?|                           



                          ?Without Kidney                           



                          Damage+---------------                           



                          --------+-------------                           



                          --------+-------------                           



                          ------------+| ?>90 ?                           



                          ? ? ? ? ? ? ? ?|                           



                          ?Stage one ? ? ? ? ?|                           



                          ? Normal ? ? ? ? ? ? ?                           



                          ?+--------------------                           



                          ---+------------------                           



                          ---+------------------                           



                          -------+| ?60-89 ? ? ?                           



                          ? ? ? ? ?| ?Stage two                           



                          ? ? ? ? ?| ? Decreased                           



                          GFR ? ? ? ?                            



                          +---------------------                           



                          --+-------------------                           



                          --+-------------------                           



                          ------+| ?30-59 ? ? ?                           



                          ? ? ? ? ?| ?Stage                           



                          three ? ? ? ?| ? Stage                           



                          three ? ? ? ? ?                           



                          +---------------------                           



                          --+-------------------                           



                          --+-------------------                           



                          ------+| ?15-29 ? ? ?                           



                          ? ? ? ? ?| ?Stage four                           



                          ? ? ? ? | ? Stage four                           



                          ? ? ? ? ?                              



                          ?+--------------------                           



                          ---+------------------                           



                          ---+------------------                           



                          -------+| ?<15 (or                           



                          dialysis) ? ?| ?Stage                           



                          five ? ? ? ? | ? Stage                           



                          five ? ? ? ? ?                           



                          ?+--------------------                           



                          ---+------------------                           



                          ---+------------------                           



                          -------+ *Each stage                           



                          assumes the associated                           



                          GFR level has been in                           



                          effect for at least                           



                          three months. ?Stages                           



                          1 to 5, with or                           



                          without kidney                           



                          disease, indicate                           



                          chronic kidney                           



                          disease. Notes:                           



                          Determination of                           



                          stages one and two                           



                          (with eGFR                             



                          >59mL/min/1.73 m2)                           



                          requires estimation of                           



                          kidney damage for at                           



                          least three months as                           



                          defined by structural                           



                          or functional                           



                          abnormalities of the                           



                          kidney, manifested by                           



                          either:Pathological                           



                          abnormalities or                           



                          Markers of kidney                           



                          damage (including                           



                          abnormalities in the                           



                          composition of the                           



                          blood or urine or                           



                          abnormalities in                           



                          imaging tests).                           

 

             Lab Interpretation Abnormal                               



             (test code = 41021-0)                                        



[facility]





             Test Item    Value        Reference Range Interpretation Comments

 

             MAGNESIUM (test code = 0044941645) 1.8 mg/dL    1.7-2.4            

       

 

             Lab Interpretation (test code = Normal                             

    



             18612-9)                                            



[facility]2020-04-10 09:15:00





             Test Item    Value        Reference Range Interpretation Comments

 

             WBC (test code =              See_Comment                [Automated



             6690-2)                                             message] The sy

stem



                                                                 which generated



                                                                 this result



                                                                 transmitted



                                                                 reference range

:



                                                                 4.30 - 11.10



                                                                 10*3/?L. The



                                                                 reference range

 was



                                                                 not used to



                                                                 interpret this



                                                                 result as



                                                                 normal/abnormal

.

 

             RBC (test code =              See_Comment  L             [Automated



             789-8)                                              message] The sy

stem



                                                                 which generated



                                                                 this result



                                                                 transmitted



                                                                 reference range

:



                                                                 3.93 - 5.25



                                                                 10*6/?L. The



                                                                 reference range

 was



                                                                 not used to



                                                                 interpret this



                                                                 result as



                                                                 normal/abnormal

.

 

             HGB (test code = 9.1 g/dL     11.6-15      L            



             718-7)                                              

 

             HCT (test code = 27.9 %       35.7-45.2    L            



             4544-3)                                             

 

             MCV (test code = 90.0 fL      80.6-95.5                 



             787-2)                                              

 

             MCH (test code = 29.4 pg      25.9-32.8                 



             785-6)                                              

 

             MCHC (test code = 32.6 g/dL    31.6-35.1                 



             786-4)                                              

 

             RDW-SD (test code = 49.8 fL      39-49.9                   



             38745-3)                                            

 

             RDW-CV (test code = 14.9 %       12-15.5                   



             788-0)                                              

 

             PLT (test code =              See_Comment                [Automated



             777-3)                                              message] The sy

stem



                                                                 which generated



                                                                 this result



                                                                 transmitted



                                                                 reference range

:



                                                                 166 - 358 10*3/

?L.



                                                                 The reference r

crystal



                                                                 was not used to



                                                                 interpret this



                                                                 result as



                                                                 normal/abnormal

.

 

             MPV (test code = 9.5 fL       9.5-12.9                  



             44839-0)                                            

 

             NRBC/100 WBC (test              See_Comment                [Automat

ed



             code = [id number])                                        message] 

The system



                                                                 which generated



                                                                 this result



                                                                 transmitted



                                                                 reference range

:



                                                                 0.0 - 10.0 /100



                                                                 WBCs. The refer

ence



                                                                 range was not u

sed



                                                                 to interpret th

is



                                                                 result as



                                                                 normal/abnormal

.

 

             NRBC x10^3 (test code <0.01        See_Comment                [Auto

mated



             = [id number])                                        message] The s

ystem



                                                                 which generated



                                                                 this result



                                                                 transmitted



                                                                 reference range

:



                                                                 10*3/?L. The



                                                                 reference range

 was



                                                                 not used to



                                                                 interpret this



                                                                 result as



                                                                 normal/abnormal

.

 

             GRAN MAT (NEUT) % 73.7 %                                 



             (test code = 770-8)                                        

 

             IMM GRAN % (test code 0.40 %                                 



             = [id number])                                        

 

             LYMPH % (test code = 17.2 %                                 



             736-9)                                              

 

             MONO % (test code = 7.6 %                                  



             5905-5)                                             

 

             EOS % (test code = 0.4 %                                  



             713-8)                                              

 

             BASO % (test code = 0.7 %                                  



             706-2)                                              

 

             GRAN MAT x10^3(ANC) 3.99 10*3/uL 1.88-7.09                 



             (test code =                                        



             2847914797)                                         

 

             IMM GRAN x10^3 (test <0.03        0-0.06                    



             code = 9016398085)                                        

 

             LYMPH x10^3 (test code 0.93 10*3/uL 1.32-3.29    L            



             = 731-0)                                            

 

             MONO x10^3 (test code 0.41 10*3/uL 0.33-0.92                 



             = 742-7)                                            

 

             EOS x10^3 (test code = <0.03        0.03-0.39    L            



             711-2)                                              

 

             BASO x10^3 (test code 0.04 10*3/uL 0.01-0.07                 



             = 704-7)                                            

 

             Lab Interpretation Abnormal                               



             (test code = 17940-5)                                        



[facility]





             Test Item    Value        Reference Range Interpretation Comments

 

             TROPONIN I (test <0.012       See_Comment                [Automated



             code = 5946512399)                                        message] 

The



                                                                 system which



                                                                 generated this



                                                                 result



                                                                 transmitted



                                                                 reference range

:



                                                                 <=0.034 ng/mL.



                                                                 The reference



                                                                 range was not



                                                                 used to interpr

et



                                                                 this result as



                                                                 normal/abnormal

.

 

             LIANE (test code = Equal or Less than                           



             LIANE)         0.034                                  



                          ng/ml---Normal                           



                          ?Note: Cardiac                           



                          troponin begins to                           



                          rise 3-4 hours                           



                          after the onset of                           



                          ischemia. Repeat                           



                          in 4-6 hours if                           



                          the sample was                           



                          drawn within 3-4                           



                          hours of the onset                           



                          of the symptom and                           



                          found normal.                           



                          Between 0.035 and                           



                          0.120 ng/mL---                           



                          Borderline.                            



                          Questionable                           



                          myocardial injury                           



                          or necrosis ?                           



                          ?Note: Serial                           



                          measurement may be                           



                          necessary to                           



                          confirm or exclude                           



                          the diagnosis of                           



                          myocardial injury                           



                          or necrosis;                           



                          Clinical                               



                          correlation                            



                          (symptoms, EKGs,                           



                          imaging studies,                           



                          and others)                            



                          required; Repeat                           



                          in 4-6 hours if                           



                          clinically                             



                          indicated. ? ? ? ?                           



                          Equal or Higher                           



                          than 0.121                             



                          ng/mL---Abnormal.                           



                          Myocardial Injury                           



                          or Necrosis Likely                           



                          ? ? ? ?  Biotin                           



                          has been reported                           



                          to cause a                             



                          negative bias,                           



                          interpret results                           



                          relative to                            



                          patient's use of                           



                          biotin. ? ? ? ? ?                           



                          ? ? ? ? ? ? ? ? ?                           



                          ? ? ? ? ? ? ? ?  ?                           



                          ? ? ? ? ? ? ? ? ?                           



                          ? ? ? ? ? ? ? ? ?                           



                          ? ? ? ? ? ? ? ? ?                           

 

             Lab Interpretation Normal                                 



             (test code =                                        



             48533-1)                                            



[facility]





             Test Item    Value        Reference Range Interpretation Comments

 

             TSH (test code =              See_Comment  H             [Automated

 message]



             5173921549)                                         The system [employer]



                                                                 generated this 

result



                                                                 transmitted ref

erence



                                                                 range: 0.45 - 4

.70



                                                                 mIU/L. The refe

rence



                                                                 range was not u

sed to



                                                                 interpret this 

result



                                                                 as normal/abnor

mal.

 

             Lab Interpretation (test Abnormal                               



             code = 83881-3)                                        



[facility]00





             Test Item    Value        Reference Range Interpretation Comments

 

             NT-proBNP (test code 880 pg/mL    See_Comment  H             [Autom

ated



             = 7129528790)                                        message] The



                                                                 system which



                                                                 generated this



                                                                 result



                                                                 transmitted



                                                                 reference range

:



                                                                 <=450. The



                                                                 reference range



                                                                 was not used to



                                                                 interpret this



                                                                 result as



                                                                 normal/abnormal

.

 

             LIANE (test code = LIANE) Biotin has been                           



                          reported to                            



                          cause a negative                           



                          bias, interpret                           



                          results relative                           



                          to patient's use                           



                          of biotin.                             

 

             Lab Interpretation Abnormal                               



             (test code = 58292-3)                                        



[facility]





             Test Item    Value        Reference Range Interpretation Comments

 

             MAGNESIUM (test code = 5720859102) 1.8 mg/dL    1.7-2.4            

       

 

             Lab Interpretation (test code = Normal                             

    



             16771-5)                                            



[facility]





             Test Item    Value        Reference Range Interpretation Comments

 

             PHOSPHORUS (test code = 9170930230) 3.6 mg/dL    2.5-5             

        

 

             Lab Interpretation (test code = Normal                             

    



             69493-9)                                            



[id number]:00





             Test Item    Value        Reference Range Interpretation Comments

 

             CK (test code = 4738855838) 63 U/L                           

 

             Lab Interpretation (test code = Normal                             

    



             58189-5)                                            



[facility] 
02:59:00





             Test Item    Value        Reference Range Interpretation Comments

 

             SARS-CoV-2 (test code = Not Detected Not Detected              



             71265-1)                                            

 

             LIANE (test code = LIANE) ID NOW COVID-19 Assay                        

   



                          is an isothermal                           



                          nucleic acid                           



                          amplification test                           



                          intended for the                           



                          qualitative detection                           



                          of nucleic acid from                           



                          SARS-CoV-2 viral RNA                           



                          in nasopharyngeal (NP)                           



                          specimens. It is used                           



                          under Emergency Use                           



                          Authorization (EUA) by                           



                          FDA. The limit of                           



                          detection (LOD) of the                           



                          assay is 125 Genome                           



                          Equivalents/mL. A                           



                          positive result is                           



                          indicative of the                           



                          presence of SARS-CoV-2                           



                          RNA. ?Clinical                           



                          correlation with                           



                          patient history and                           



                          other diagnostic                           



                          information is                           



                          necessary to determine                           



                          patient infection                           



                          status. A negative                           



                          (Not Detected) result                           



                          does not preclude                           



                          SARS-CoV-2 infection.                           



                          Clinical correlation                           



                          with patient history                           



                          and other diagnostic                           



                          information should be                           



                          used in patient                           



                          management decisions.                           



                          Invalid: Please                           



                          collect a new specimen                           



                          for repeat patient                           



                          testing if clinically                           



                          indicated.                             

 

             Lab Interpretation Normal                                 



             (test code = 44380-6)                                        



[facility]





             Test Item    Value        Reference Range Interpretation Comments

 

             APPEARANCE (test code = Clear        Clear                     



             6525180716)                                         

 

             COLOR (test code = Yellow       Yellow                    



             9084742628)                                         

 

             PH (test code =              4.8-8.0                   



             5783630351)                                         

 

             SP GRAVITY (test code =              1.003-1.030               



             3691793422)                                         

 

             GLU U QUAL (test code = Normal       Normal                    



             8405820687)                                         

 

             BLOOD (test code = 1+           Negative     A            



             0230434475)                                         

 

             KETONES (test code = 5 mg/dL      Negative     A            



             9276354970)                                         

 

             PROTEIN (test code = Negative     Negative                  



             2887-8)                                             

 

             UROBILIN (test code = Normal       Normal                    



             7616601814)                                         

 

             BILIRUBIN (test code = Negative     Negative                  



             4921557957)                                         

 

             NITRITE (test code = Negative     Negative                  



             5655325225)                                         

 

             LEUK TENZIN (test code = Negative     Negative                  



             1155274713)                                         

 

             RBC/HPF (test code =              See_Comment  H             [Autom

ated message]



             2144870952)                                         The system [employer]



                                                                 generated this 

result



                                                                 transmitted ref

erence



                                                                 range: 0 - 3 HP

F. The



                                                                 reference range

 was



                                                                 not used to int

erpret



                                                                 this result as



                                                                 normal/abnormal

.

 

             WBC/HPF (test code =              See_Comment                [Autom

ated message]



             5943281813)                                         The system [employer]



                                                                 generated this 

result



                                                                 transmitted ref

erence



                                                                 range: 0 - 5 HP

F. The



                                                                 reference range

 was



                                                                 not used to int

erpret



                                                                 this result as



                                                                 normal/abnormal

.

 

             BACTERIA (test code = Few          Negative     A            



             7329397852)                                         

 

             MUCOUS (test code = Slight       Negative LPF A            



             0447433845)                                         

 

             SQ EPITH (test code = <1           HPF                       



             9743219913)                                         

 

             Lab Interpretation (test Abnormal                               



             code = 51817-3)                                        



[facility]





             Test Item    Value        Reference Range Interpretation Comments

 

             TROPONIN I (test <0.012       See_Comment                [Automated



             code = 8631469966)                                        message] 

The



                                                                 system which



                                                                 generated this



                                                                 result



                                                                 transmitted



                                                                 reference range

:



                                                                 <=0.034 ng/mL.



                                                                 The reference



                                                                 range was not



                                                                 used to interpr

et



                                                                 this result as



                                                                 normal/abnormal

.

 

             LIANE (test code = Equal or Less than                           



             LIANE)         0.034                                  



                          ng/ml---Normal                           



                          ?Note: Cardiac                           



                          troponin begins to                           



                          rise 3-4 hours                           



                          after the onset of                           



                          ischemia. Repeat                           



                          in 4-6 hours if                           



                          the sample was                           



                          drawn within 3-4                           



                          hours of the onset                           



                          of the symptom and                           



                          found normal.                           



                          Between 0.035 and                           



                          0.120 ng/mL---                           



                          Borderline.                            



                          Questionable                           



                          myocardial injury                           



                          or necrosis ?                           



                          ?Note: Serial                           



                          measurement may be                           



                          necessary to                           



                          confirm or exclude                           



                          the diagnosis of                           



                          myocardial injury                           



                          or necrosis;                           



                          Clinical                               



                          correlation                            



                          (symptoms, EKGs,                           



                          imaging studies,                           



                          and others)                            



                          required; Repeat                           



                          in 4-6 hours if                           



                          clinically                             



                          indicated. ? ? ? ?                           



                          Equal or Higher                           



                          than 0.121                             



                          ng/mL---Abnormal.                           



                          Myocardial Injury                           



                          or Necrosis Likely                           



                          ? ? ? ?  Biotin                           



                          has been reported                           



                          to cause a                             



                          negative bias,                           



                          interpret results                           



                          relative to                            



                          patient's use of                           



                          biotin. ? ? ? ? ?                           



                          ? ? ? ? ? ? ? ? ?                           



                          ? ? ? ? ? ? ? ?  ?                           



                          ? ? ? ? ? ? ? ? ?                           



                          ? ? ? ? ? ? ? ? ?                           



                          ? ? ? ? ? ? ? ? ?                           

 

             Lab Interpretation Normal                                 



             (test code =                                        



             28726-1)                                            



[id number] 
00:49:00





             Test Item    Value        Reference Range Interpretation Comments

 

             NA (test code = 140 mmol/L   135-145                   



             5000247805)                                         

 

             K (test code = 3.4 mmol/L   3.5-5        L            



             9560097808)                                         

 

             CL (test code = 106 mmol/L                       



             9580380041)                                         

 

             CO2 TOTAL (test code = 24 mmol/L    23-31                     



             9241494745)                                         

 

             AGAP (test code =              2-16                      



             2634952668)                                         

 

             BUN (test code = 10 mg/dL     7-23                      



             7732227702)                                         

 

             GLUCOSE (test code = 106 mg/dL                        



             8184011293)                                         

 

             CREATININE (test code = 0.65 mg/dL   0.5-1.04                  



             1058076249)                                         

 

             TOTAL BILI (test code = 0.7 mg/dL    0.1-1.1                   



             2530501702)                                         

 

             CALCIUM (test code = 8.8 mg/dL    8.6-10.6                  



             0171821871)                                         

 

             T PROTEIN (test code = 7.0 g/dL     6.3-8.2                   



             1281803837)                                         

 

             ALBUMIN (test code = 3.5 g/dL     3.5-5                     



             7356256983)                                         

 

             ALK PHOS (test code = 137 U/L             H            



             0767678627)                                         

 

             ALTv (test code = 13 U/L       5-35                      



             1742-6)                                             

 

             AST(SGOT) (test code = 25 U/L       13-40                     



             9361409632)                                         

 

             eGFR Calculation              mL/min/1.73m2              



             (Non-)                                        



             (test code =                                        



             1327821515)                                         

 

             eGFR Calculation              mL/min/1.73m2              



             (African American)                                        



             (test code =                                        



             6951106936)                                         

 

             LIANE (test code = LIANE) Association of                           



                          Glomerular Filtration                           



                          Rate (GFR) and Staging                           



                          of Kidney Disease*                           



                          +---------------------                           



                          --+-------------------                           



                          --+-------------------                           



                          ------+| GFR                           



                          (mL/min/1.73 m2) ?|                           



                          With Kidney Damage ?|                           



                          ?Without Kidney                           



                          Damage+---------------                           



                          --------+-------------                           



                          --------+-------------                           



                          ------------+| ?>90 ?                           



                          ? ? ? ? ? ? ? ?|                           



                          ?Stage one ? ? ? ? ?|                           



                          ? Normal ? ? ? ? ? ? ?                           



                          ?+--------------------                           



                          ---+------------------                           



                          ---+------------------                           



                          -------+| ?60-89 ? ? ?                           



                          ? ? ? ? ?| ?Stage two                           



                          ? ? ? ? ?| ? Decreased                           



                          GFR ? ? ? ?                            



                          +---------------------                           



                          --+-------------------                           



                          --+-------------------                           



                          ------+| ?30-59 ? ? ?                           



                          ? ? ? ? ?| ?Stage                           



                          three ? ? ? ?| ? Stage                           



                          three ? ? ? ? ?                           



                          +---------------------                           



                          --+-------------------                           



                          --+-------------------                           



                          ------+| ?15-29 ? ? ?                           



                          ? ? ? ? ?| ?Stage four                           



                          ? ? ? ? | ? Stage four                           



                          ? ? ? ? ?                              



                          ?+--------------------                           



                          ---+------------------                           



                          ---+------------------                           



                          -------+| ?<15 (or                           



                          dialysis) ? ?| ?Stage                           



                          five ? ? ? ? | ? Stage                           



                          five ? ? ? ? ?                           



                          ?+--------------------                           



                          ---+------------------                           



                          ---+------------------                           



                          -------+ *Each stage                           



                          assumes the associated                           



                          GFR level has been in                           



                          effect for at least                           



                          three months. ?Stages                           



                          1 to 5, with or                           



                          without kidney                           



                          disease, indicate                           



                          chronic kidney                           



                          disease. Notes:                           



                          Determination of                           



                          stages one and two                           



                          (with eGFR                             



                          >59mL/min/1.73 m2)                           



                          requires estimation of                           



                          kidney damage for at                           



                          least three months as                           



                          defined by structural                           



                          or functional                           



                          abnormalities of the                           



                          kidney, manifested by                           



                          either:Pathological                           



                          abnormalities or                           



                          Markers of kidney                           



                          damage (including                           



                          abnormalities in the                           



                          composition of the                           



                          blood or urine or                           



                          abnormalities in                           



                          imaging tests).                           

 

             Lab Interpretation Abnormal                               



             (test code = 50112-4)                                        



[facility] WITH DIFFERENTIAL2020-04-10 00:37:00





             Test Item    Value        Reference Range Interpretation Comments

 

             WBC (test code =              See_Comment                [Automated



             6690-2)                                             message] The sy

stem



                                                                 which generated



                                                                 this result



                                                                 transmitted



                                                                 reference range

:



                                                                 4.30 - 11.10



                                                                 10*3/?L. The



                                                                 reference range

 was



                                                                 not used to



                                                                 interpret this



                                                                 result as



                                                                 normal/abnormal

.

 

             RBC (test code =              See_Comment  L             [Automated



             789-8)                                              message] The sy

stem



                                                                 which generated



                                                                 this result



                                                                 transmitted



                                                                 reference range

:



                                                                 3.93 - 5.25



                                                                 10*6/?L. The



                                                                 reference range

 was



                                                                 not used to



                                                                 interpret this



                                                                 result as



                                                                 normal/abnormal

.

 

             HGB (test code = 10.5 g/dL    11.6-15      L            



             718-7)                                              

 

             HCT (test code = 32.4 %       35.7-45.2    L            



             4544-3)                                             

 

             MCV (test code = 89.8 fL      80.6-95.5                 



             787-2)                                              

 

             MCH (test code = 29.1 pg      25.9-32.8                 



             785-6)                                              

 

             MCHC (test code = 32.4 g/dL    31.6-35.1                 



             786-4)                                              

 

             RDW-SD (test code = 50.0 fL      39-49.9      H            



             10184-2)                                            

 

             RDW-CV (test code = 15.1 %       12-15.5                   



             788-0)                                              

 

             PLT (test code =              See_Comment                [Automated



             777-3)                                              message] The sy

stem



                                                                 which generated



                                                                 this result



                                                                 transmitted



                                                                 reference range

:



                                                                 166 - 358 10*3/

?L.



                                                                 The reference r

crystal



                                                                 was not used to



                                                                 interpret this



                                                                 result as



                                                                 normal/abnormal

.

 

             MPV (test code = 9.2 fL       9.5-12.9     L            



             44032-5)                                            

 

             NRBC/100 WBC (test              See_Comment                [Automat

ed



             code = 3821485964)                                        message] 

The system



                                                                 which generated



                                                                 this result



                                                                 transmitted



                                                                 reference range

:



                                                                 0.0 - 10.0 /100



                                                                 WBCs. The refer

ence



                                                                 range was not u

sed



                                                                 to interpret th

is



                                                                 result as



                                                                 normal/abnormal

.

 

             NRBC x10^3 (test code <0.01        See_Comment                [Auto

mated



             = 9226091565)                                        message] The s

ystem



                                                                 which generated



                                                                 this result



                                                                 transmitted



                                                                 reference range

:



                                                                 10*3/?L. The



                                                                 reference range

 was



                                                                 not used to



                                                                 interpret this



                                                                 result as



                                                                 normal/abnormal

.

 

             GRAN MAT (NEUT) % 75.7 %                                 



             (test code = 770-8)                                        

 

             IMM GRAN % (test code 0.50 %                                 



             = 1019691568)                                        

 

             LYMPH % (test code = 17.1 %                                 



             736-9)                                              

 

             MONO % (test code = 5.7 %                                  



             5905-5)                                             

 

             EOS % (test code = 0.4 %                                  



             713-8)                                              

 

             BASO % (test code = 0.6 %                                  



             706-2)                                              

 

             GRAN MAT x10^3(ANC) 5.98 10*3/uL 1.88-7.09                 



             (test code =                                        



             0627268663)                                         

 

             IMM GRAN x10^3 (test 0.04 10*3/uL 0-0.06                    



             code = 1963923668)                                        

 

             LYMPH x10^3 (test code 1.35 10*3/uL 1.32-3.29                 



             = 731-0)                                            

 

             MONO x10^3 (test code 0.45 10*3/uL 0.33-0.92                 



             = 742-7)                                            

 

             EOS x10^3 (test code = 0.03 10*3/uL 0.03-0.39                 



             711-2)                                              

 

             BASO x10^3 (test code 0.05 10*3/uL 0.01-0.07                 



             = 704-7)                                            

 

             Lab Interpretation Abnormal                               



             (test code = 01213-1)                                        



[facility] 00[facility]34[facility]57 1. ?Left 
lower lobe opacities again seen likely represent combination ofatelectasis or 
scarring.2. ?A left-sided moderate-sized hiatal hernia is again seen.EXAM: XR 
CHEST 1 VW HISTORY: fall, LOC  COMPARISON: 3/22/2020 FINDINGS: Lines/Tubes: 
None. Lungs: Again seen the left lower lobe hazy opacity with an air-fluid 
levelconsistent with hiatal hernia and adjacent atelectasis or scarring. 
Nopleural effusion or pneumothorax is identified. Heart/Mediastinum: The 
cardiomediastinal silhouette is normal fortechnique. Bones: No acute osseous 
abnormality is seen. Right shoulder reversearthroplasty was partial visualized. 
[facility], Radiant Results Inft User - 2020  7:36 PM CDTEXAM: XR CHEST 1 
VWHISTORY: fall, LOC COMPARISON: 3/22/2020FINDINGS:Lines/Tubes: None.Lungs: 
Again seen the left lower lobe hazy opacity with an air-fluid levelconsistent 
with hiatal hernia and adjacent atelectasis or scarring. Nopleural effusion or 
pneumothorax is identified.Heart/Mediastinum: The cardiomediastinal silhouette 
is normal fortechnique.Bones: No acute osseous abnormality is seen. Right 
shoulder reversearthroplasty was partial visualized.IMPRESSION1.  Left lower 
lobe opacities again seen likely represent combinationofatelectasis or 
scarring.2.  A left-sided moderate-sized hiatal hernia is again seen.[facility] [id number] 20:59:00Nancy Mercedes PAC ?
? 2020 ?3:59 PMLaceration RepairPerformed by: Nancy Mercedes PACAuthorized 
by: Nancy Mercedes PAC  Consent:  ?Consent obtained: unable to obtain verbal 
consent for procedurefrom patient due to dementia but consent given by family to
treat patient. ?Risks discussed: ?Infection, poor wound healing and need for 
additional repair (with patient's daughter)Universal protocol:  ?Patient 
identity confirmed: ?Arm bandAnesthesia (see MAR for exact dosages):  
?Anesthesia method: ?Local infiltration ?Local anesthetic: ?Lidocaine 1% w/o 
epiLaceration details:  ?Location: ?Scalp ?Scalp location: ?OccipitalRepair 
type:  ?Repair type: ?IntermediateExploration:  ?Hemostasis achieved with: 
?Direct pressure ?Wound exploration: entire depth of wound probed and visualized
? ?Wound extent:no foreign bodies/material noted ?Treatment:  ?Area cleansed 
with: ?Betadine ?Amount of cleaning: ?StandardSkin repair:  ?Repair method: 
?Staples ?Number of staples: ?2Approximation:  ?Approximation: ?ClosePost-
procedure details:  ?Dressing: ?Bulky dressing (pressure dressing) ?Patient 
tolerance of procedure: ?Tolerated well, no immediate complicationsComments:  ? 
Small laceration with large hematomaunderlying; created limited defect by 
expanding laceration to drain bleeding and staples placed in wound followed by 
surgicel and pressure dressing.[facility] 
SPINE WO [id number] 19:53:25HISTORY: ?Trauma. S/P fall. TECHNIQUE: 62-
multidetector Spiral CT of the cervical spine is obtained andsubsequently 
sagittal, coronal reformations are obtained. FINDINGS: Comparison is made with 
3/22/2020 study. No acute compression fracture in cervical spine detected. 
However,compression fracture is seen involving upper plate of T3 vertebral body,
ofuncertain age. Unfortunately the T3 was not included in the previous CTscan of
3/22/2020.  A linear radiolucent line also noted in the left lamina of T3 with 
wellcorticated edges. This is not confirmed to be a definite fracture on 
thereformatted sagittal and coronal images and could be caused by 
focaldegenerative change in the superior articular facetof T4 vertebral body. 
Lower cervical and upper thoracic levoscoliosis noted. Degenerative discdisease 
noted at C5-C6, C6-C7. 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 
and T1-T2 levels. Left facet joint at C2-C3 is partiallyfused. Soft tissues in 
prevertebral region appear normal. Spinal cord or ligamentor vascular injury can
not be evaluated by this study. CONCLUSIONS: Compression fracture in upper T3 
with loss of rmmryfupmxhfy64% of the height. Exact age of the fracture is 
uncertain. Unfortunately Y6tkuhtwnah body was not included in previous CT scan 
of 3/22/2020. However,fracture appears stable with no encroachment into the 
spinal canal. [facility], Radiant Results Inft User - 20202:54 PM CDTHISTORY:  
Trauma. S/P fall.TECHNIQUE: 64-multidetector Spiral CT of the cervical spine is
obtained andsubsequently sagittal, coronal reformations are obtained.FINDINGS: 
Comparison is made with 3/22/2020 study.No acute compression fracture in 
cervical spine detected. However,compression fracture is seen involving upper 
plate of T3 vertebral body, ofuncertain age. Unfortunately the T3 was not 
included in the previous CTscan of 3/22/2020. A linear radiolucent line also 
noted in the left lamina of T3 with wellcorticated edges. This is not confirmed 
to be a definite fracture on thereformattedsagittal and coronal images and could
be caused by focaldegenerative change in the superior articular facet of T4 
vertebral body.Lower cervical and upper thoracic levoscoliosis noted. 
Degenerative discdisease noted at C5-C6, C6-C7. Facet arthritis noted, bilateral
at C3-C4,C4-C5, C7-T1 and T1-T2 levels. Left facet joint at C2-C3 is 
partiallyfused.Soft tissues in prevertebral region appear normal. Spinal cord or
ligamentor vascular injury can not be evaluated by this study.CONCLUSIONS: 
Compression fracture in upper T3 with loss of htkigvlnexgnb31% of the height. 
Exact age of the fracture is uncertain. Unfortunately T0icxuyhhur body was not 
included in previous CT scan of 3/22/2020. However,fractureappears stable with 
no encroachment into the spinal canal.[facility] 
WO [id number] 19:50:21 A large, mixed density posterior scalp hematoma 
with evidence of ongoingbleeding is noted. There isno underlying calvarial 
fracture or acute intracranialabnormality.CT HEAD WO CONTRAST HISTORY: Female 74
years fall-head injury  COMPARISON: CT head dated 3/22/2020 TECHNIQUE: Routine 
CT head without contrast FINDINGS: A large, mixed density scalp hematoma is 
noted posteriorly. The ventricles are unchanged in caliber and configuration. 
The ventriclesare unchanged in caliber and configuration with mild dilatation of
thelateral and third ventricles again noted. No midline shift or 
pathologicalextra-axial fluid collection is present. The basal cisterns 
areunremarkable. No acute intracranial hemorrhageor mass effect is present. The 
gray-whitematter differentiation is preserved. Mild background ischemic small 
vesseldisease is again noted. The calvarium and skull base are unremarkable. The
mastoid aircells andvisualized paranasal air sinuses are clear. [facility], [facility] 
Results Inft User - 2020  2:51 PM CDTCT HEAD WO CONTRASTHISTORY: Female 74
years fall-head injury COMPARISON: CT head dated 3/22/2020TECHNIQUE: Routine CT 
head without contrastFINDINGS:A large, mixed density scalp hematoma is noted 
posteriorly.The ventricles are unchanged in caliber and configuration. The 
ventriclesare unchangedin caliber and configuration with mild dilatation of 
thelateral and third ventricles again noted. Nomidline shift or 
pathologicalextra-axial fluid collection is present. The basal cisterns 
areunremarkable.No acute intracranial hemorrhage or mass effect is present. The 
gray-whitematter differentiationis preserved. Mild background ischemic small 
vesseldisease is again noted.The calvarium and skull base are unremarkable. The 
mastoid air cells andvisualized paranasal air sinuses are clear.IMPRESSIONA l
arge, mixed density posterior scalp hematoma with evidence of ongoingbleeding is
noted.There is no underlying calvarial fracture or acute 
intracranialabnormality.[facility]
2020 17:43:01 Displaced angulated fracture of the second toe distal P1. 
Diffuse osteopenia. Naviculocuneiform osteoarthrosis with overlying soft tissue 
swelling Preliminary Report Dictated by Resident: Willi Laird MD., have reviewed this study and agree with the abovereport.EXAM: XR 
FOOT 3+ VW LEFT HISTORY: left 2nd toe pain and abnormality.  COMPARISON: None 
FINDINGS:  Radiographs of the leftfoot demonstrate displaced angulated fracture 
ofthe second toe P1 head/neck with apex lateral angulation of the distalfracture
fragment. The joints maintain alignment. Diffuse osteopenia isseen. Calcaneal 
enthesophyte formation is seen. Osteoarthritic changes ofthe talonavicular joint
are noted. 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 Degenerative cyst formation is seen at
the superior medial navicular bone.Mild soft tissue swelling around the distal 
second toe is seen. [facility], [facility] Results Inft User - 2020 12:44 PM 
CDTEXAM:XR FOOT 3+ VW LEFTHISTORY:left 2nd toe pain and abnormality. 
COMPARISON:NoneFINDINGS: Radiographs of the left foot demonstrate displaced 
angulated fracture ofthe second toe P1 head/neck with apex lateral angulation of
the distalfracture fragment. The joints maintain alignment. Diffuse osteopenia 
isseen. Calcaneal enthesophyte formation is seen. Osteoarthritic changes ofthe 
talonavicular joint are noted. 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 Degenerative cyst 
formation is seen at the superior medial navicular bone.Mild soft tissue 
swelling around the distal second toe is seen.IMPRESSIONDisplaced angulated 
fracture of the second toe distal P1.Diffuse osteopenia.Naviculocuneiform osteo
arthrosis with overlying soft tissue swellingPreliminary Report Dictated by 
Resident: Willi Pickett MD., have reviewed this study and agree
with the abovereport.[facility]
2020 17:41:51 Successful reduction of the second toe P1 fracture. 
Preliminary Report Dictated by Resident: Willi Laird MD., 
have reviewed this study and agree with the abovereport.EXAM: XR FOOT &lt;3 VW 
LEFT HISTORY: post reduction of 2nd toe on left foot.  COMPARISON: Same day left
foot radiographs FINDINGS:  Status post reduction radiographs demonstrate 
interval improved alignmentof the second toe P1 fracture, now in anatomic 
alignment. Diffuseosteopenia is noted. Overlying soft tissue swelling is 
present. [facility], [facility] Results In[facility] User - 2020 12:42 PM CDTEXAM:XR FOOT 
&lt;3 VW LEFTHISTORY:post reduction of 2nd toe on left foot. COMPARISON:Same day
left foot radiographsFINDINGS: Status post reduction radiographs demonstrate 
interval improved alignmentof the second toe P1 fracture, now in anatomic 
alignment. Diffuseosteopenia is noted. Overlying soft tissue swelling is 
present.IMPRESSIONSuccessful reduction of the second toe P1 fracture.Preliminary
Report Dictated by Resident: Willi Pickett MD., have reviewed 
this study and agree with the abovereport.[facility]
[id number] 16:48:00





             Test Item    Value        Reference Range Interpretation Comments

 

             APPEARANCE (test code = Clear        Clear                     



             [id number])                                         

 

             COLOR (test code = Straw        Yellow       A            



             [id number])                                         

 

             PH (test code =              4.8-8.0                   



             2195082377)                                         

 

             SP GRAVITY (test code =              1.003-1.030               



             [id number])                                         

 

             GLU U QUAL (test code = Normal       Normal                    



             [id number])                                         

 

             BLOOD (test code = 1+           Negative     A            



             [id number])                                         

 

             KETONES (test code = Negative     Negative                  



             [id number])                                         

 

             PROTEIN (test code = Negative     Negative                  



             2887-8)                                             

 

             UROBILIN (test code = Normal       Normal                    



             1643251112)                                         

 

             BILIRUBIN (test code = Negative     Negative                  



             [id number])                                         

 

             NITRITE (test code = Negative     Negative                  



             6126853249)                                         

 

             LEUK TENZIN (test code = Negative     Negative                  



             4595646988)                                         

 

             RBC/HPF (test code =              See_Comment                [Autom

ated message]



             [id number])                                         The system [employer]



                                                                 generated this 

result



                                                                 transmitted ref

erence



                                                                 range: 0 - 3 HP

F. The



                                                                 reference range

 was



                                                                 not used to int

erpret



                                                                 this result as



                                                                 normal/abnormal

.

 

             WBC/HPF (test code =              See_Comment                [Autom

ated message]



             [id number])                                         The system [employer]



                                                                 generated this 

result



                                                                 transmitted ref

erence



                                                                 range: 0 - 5 HP

F. The



                                                                 reference range

 was



                                                                 not used to int

erpret



                                                                 this result as



                                                                 normal/abnormal

.

 

             BACTERIA (test code = Negative     Negative                  



             [id number])                                         

 

             SQ EPITH (test code = <1           HPF                       



             [id number])                                         

 

             Lab Interpretation (test Abnormal                               



             code = 67499-2)                                        



[facility] [id number] 16:40:28 No 
acute intracranial findings. No acute osseous findings in the cervical spine.   
HISTORY: Head trauma, minor, GCS&gt;=13, high clinical risk, initial examAltered
mental status (AMS), unclear cause  TECHNIQUE:CT head without contrast.CT 
cervical spine without contrast.  COMPARISON: 10/17/2018. FINDINGS: CT HEAD: The
ventricles and sulci are within normal limits for patient's age. Asmall 
contusion is seen in the midline frontal scalp (2:8). There is nomidline shift. 
The basal cisterns are preserved. No large vascularterritory infarction, 
intracranial hemorrhage or mass effect is seen. ? CT cervical spine: There is 
mild anterolisthesis of C3 on C4. The vertebral body heights arepreserved. No 
acutefractures are seen. Fusion is seen in the C2-C3 andC6-C7 vertebral bodies. 
Moderate multilevel degenerative changes are notedthe form of disc space 
narrowing, marginal osteophytes and facetarthropathy,with the facet arthropathy 
being the dominant component ofthese changes. [facility], [facility] Results Inft User - 
2020 11:41 AM CDTHISTORY: Head trauma, minor, GCS&gt;=13, high clinical 
risk, initial examAltered mental status (AMS), unclear cause TECHNIQUE:CT head 
without contrast.CT cervical spine without contrast. COMPARISON: 
10/17/2018.FINDINGS:CT HEAD:The ventricles and sulci are within normal limits 
for patient's age. Asmall contusion is seen in the midline frontal scalp (2:8). 
There is nomidline shift. The basal cisterns are preserved. No large 
vascularterritory infarction, intracranial hemorrhage or mass effect is seen.  
CT cervical spine:There is mild anterolisthesis of C3 on C4. The vertebral body 
heights arepreserved. No acute fractures are seen. Fusion is seen in the C2-C3 
andC6-C7 vertebral bodies. Moderate multilevel degenerative changes are notedthe
form of disc space narrowing, marginal osteophytes and facetarthropathy, with 
the facet arthropathy being the dominant component ofthese changes.IMPRESSIONNo 
acute intracranial findings.No acute osseous findings in the cervical spine.
[facility] WO [id number] 
16:40:28 No acute intracranial findings. No acute osseous findings in the 
cervical spine.   HISTORY: Head trauma, minor, GCS&gt;=13, high clinical risk, 
initial examAltered mental status (AMS), unclear cause  TECHNIQUE:CT head 
without contrast.CT cervical spine without contrast.  COMPARISON: 10/17/2018. 
FINDINGS: CT HEAD: The ventricles and sulci are within normal limits for 
patient's age. Asmall contusion is seen in the midline frontal scalp (2:8). 
There is nomidline shift. The basal cisterns are preserved. No large 
vascularterritory infarction, intracranial hemorrhage or mass effect is seen. ? 
CT cervical spine: There is mild anterolisthesis of C3 on C4. The vertebral body
heights arepreserved. No acutefractures are seen. Fusion is seen in the C2-C3 
andC6-C7 vertebral bodies. Moderate multilevel degenerative changes are notedthe
form of disc space narrowing, marginal osteophytes and facetarthropathy,with the
facet arthropathy being the dominant component ofthese changes. [facility], [facility] 
Results Inft User - 2020 11:41 AM CDTHISTORY: Head trauma, minor, 
GCS&gt;=13, high clinical risk, initial examAltered mental status (AMS), unclear
cause TECHNIQUE:CT head without contrast.CT cervical spine without contrast. 
COMPARISON: 10/17/2018.FINDINGS:CT HEAD:The ventricles and sulci are within 
normal limits for patient's age. Asmall contusion is seen in the midline frontal
scalp (2:8). There is nomidline shift. The basal cisterns are preserved. No 
large vascularterritory infarction, intracranial hemorrhage or mass effect is 
seen.  CT cervical spine:There is mild anterolisthesis of C3 on C4. The vertebr
al body heights arepreserved. No acute fractures are seen. Fusion is seen in the
C2-C3 andC6-C7 vertebral bodies. Moderate multilevel degenerative changes are 
notedthe form of disc space narrowing, marginal osteophytes and 
facetarthropathy, with the facet arthropathy being the dominant component ofthes
e changes.IMPRESSIONNo acute intracranial findings.No acute osseous findings in 
the cervical spine.[facility] 1 [id number] 
16:19:01 Left lower lobe opacities likely representing 
atelectasis/scarring.Infection cannot be excluded. Moderate-sized hiatal hernia,
unchanged. Preliminary Report Dictated by Resident: Eduardo Laird MD., have reviewed this study and agree with the abovereport.XR CHEST 1 VW
HISTORY: AMS, fall  COMPARISON: 2020 TECHNIQUE: AP radiograph of the chest 
was performed. FINDINGS: Emphysematous lungs. Rounded opacity projecting over 
the mediastinum withair-fluid levels likely representing a moderate size hiatal 
hernia,unchanged. Left lower lobe hazy opacities may 
representscarring/atelectasis and/or consolidations. No right pleural effusion. 
Nopneumothorax. The lower neck is not included on the study and notevaluated. 
The heart size is normal. Calcifications of the aortic knob. No acute osseous 
abnormality. Prior cholecystectomy. Diffuse osteopenia.Right shoulder 
arthroplasty changes arepartially visualized. [facility], [facility] Results Inft User - 
2020 11:20 AM CDTXR CHEST 1 VWHISTORY:AMS, fall COMPARISON: 
2020TECHNIQUE: AP radiograph of the chest was performed.FINDINGS:Emphysema
tous lungs. Rounded opacity projecting over the mediastinum withair-fluid levels
likely representinga moderate size hiatal hernia,unchanged. Left lower lobe hazy
opacities may representscarring/atelectasis and/or consolidations. No right 
pleural effusion. Nopneumothorax. The lower neck is not included on the study 
and notevaluated.The heart size is normal. Calcifications of the aortic knob.No 
acute osseous abnormality. Prior cholecystectomy. Diffuse osteopenia.Right 
shoulder arthroplasty changes are partially visualized.IMPRESSIONLeft lower lobe
opacities likely representing atelectasis/scarring.Infection cannot be 
excluded.Moderate-sized hiatal hernia, unchanged.Preliminary Report Dictated by 
Resident: Eduardo Pickett MD., have reviewed this study and agree
with the abovereport.[facility]71-93 
15:53:00





             Test Item    Value        Reference Range Interpretation Comments

 

             TROPONIN I (test 0.006 ng/mL  See_Comment                [Automated



             code = 2637985409)                                        message] 

The



                                                                 system which



                                                                 generated this



                                                                 result



                                                                 transmitted



                                                                 reference range

:



                                                                 <=0.034. The



                                                                 reference range



                                                                 was not used to



                                                                 interpret this



                                                                 result as



                                                                 normal/abnormal

.

 

             LIANE (test code = Equal or Less than                           



             LIANE)         0.034                                  



                          ng/ml---Normal                           



                          ?Note: Cardiac                           



                          troponin begins to                           



                          rise 3-4 hours                           



                          after the onset of                           



                          ischemia. Repeat                           



                          in 4-6 hours if                           



                          the sample was                           



                          drawn within 3-4                           



                          hours of the onset                           



                          of the symptom and                           



                          found normal.                           



                          Between 0.035 and                           



                          0.120 ng/mL---                           



                          Borderline.                            



                          Questionable                           



                          myocardial injury                           



                          or necrosis ?                           



                          ?Note: Serial                           



                          measurement may be                           



                          necessary to                           



                          confirm or exclude                           



                          the diagnosis of                           



                          myocardial injury                           



                          or necrosis;                           



                          Clinical                               



                          correlation                            



                          (symptoms, EKGs,                           



                          imaging studies,                           



                          and others)                            



                          required; Repeat                           



                          in 4-6 hours if                           



                          clinically                             



                          indicated. ? ? ? ?                           



                          Equal or Higher                           



                          than 0.121                             



                          ng/mL---Abnormal.                           



                          Myocardial Injury                           



                          or Necrosis Likely                           



                          ? ? ? ?  Biotin                           



                          has been reported                           



                          to cause a                             



                          negative bias,                           



                          interpret results                           



                          relative to                            



                          patient's use of                           



                          biotin. ? ? ? ? ?                           



                          ? ? ? ? ? ? ? ? ?                           



                          ? ? ? ? ? ? ? ?  ?                           



                          ? ? ? ? ? ? ? ? ?                           



                          ? ? ? ? ? ? ? ? ?                           



                          ? ? ? ? ? ? ? ? ?                           

 

             Lab Interpretation Normal                                 



             (test code =                                        



             68800-9)                                            



[facility] / [id number] 15:45:00





             Test Item    Value        Reference Range Interpretation Comments

 

             PROTIME PATIENT (test              See_Comment                [Auto

mated message]



             code = 5964-2)                                        The system wh

ich



                                                                 generated this 

result



                                                                 transmitted ref

erence



                                                                 range: 12.0 - 1

4.7



                                                                 Seconds. The re

ference



                                                                 range was not u

sed to



                                                                 interpret this 

result



                                                                 as normal/abnor

mal.

 

             INR (test code = 6301-6)                                        Nor

mal INR <1.1;



                                                                 Warfarin Therap

eutic



                                                                 range 2.0 to 3.

0 or



                                                                 2.5 to 3.5, dep

ending



                                                                 upon the indica

tions.

 

             Lab Interpretation (test Normal                                 



             code = 38690-3)                                        



[facility]TT2020[facility] 15:43:00





             Test Item    Value        Reference Range Interpretation Comments

 

             APTT Patient (test              See_Comment                [Automat

ed



             code = 3173-2)                                        message] The



                                                                 system which



                                                                 generated this



                                                                 result



                                                                 transmitted



                                                                 reference range

:



                                                                 23 - 38 Seconds

.



                                                                 The reference



                                                                 range was not



                                                                 used to interpr

et



                                                                 this result as



                                                                 normal/abnormal

.

 

             LIANE (test code = LIANE) [facility]                           



                          population mean                           



                          normal value for                           



                          aPTT is 30                             



                          seconds.                               

 

             Lab Interpretation Normal                                 



             (test code = 50151-0)                                        



[facility]. METABOLIC PANEL ([id number])2020 
15:42:00





             Test Item    Value        Reference Range Interpretation Comments

 

             NA (test code = 136 mmol/L   135-145                   



             [id number])                                         

 

             K (test code = 3.1 mmol/L   3.5-5        L            



             2400808337)                                         

 

             CL (test code = 102 mmol/L                       



             8465759352)                                         

 

             CO2 TOTAL (test code = 25 mmol/L    23-31                     



             9406931211)                                         

 

             AGAP (test code =              2-16                      



             4757153081)                                         

 

             BUN (test code = 11 mg/dL     7-23                      



             6291745524)                                         

 

             GLUCOSE (test code = 99 mg/dL                         



             1984624891)                                         

 

             CREATININE (test code = 0.70 mg/dL   0.5-1.04                  



             1868024685)                                         

 

             TOTAL BILI (test code = 1.0 mg/dL    0.1-1.1                   



             3819503004)                                         

 

             CALCIUM (test code = 8.8 mg/dL    8.6-10.6                  



             8193826934)                                         

 

             T PROTEIN (test code = 7.3 g/dL     6.3-8.2                   



             5596080425)                                         

 

             ALBUMIN (test code = 3.7 g/dL     3.5-5                     



             9246485653)                                         

 

             ALK PHOS (test code = 121 U/L                          



             8247923264)                                         

 

             ALTv (test code = 11 U/L       5-35                      



             1742-6)                                             

 

             AST(SGOT) (test code = 27 U/L       13-40                     



             0065402456)                                         

 

             eGFR Calculation              mL/min/1.73m2              



             (Non-)                                        



             (test code =                                        



             5016085877)                                         

 

             eGFR Calculation              mL/min/1.73m2              



             (African American)                                        



             (test code =                                        



             6036204167)                                         

 

             LIANE (test code = LIANE) Association of                           



                          Glomerular Filtration                           



                          Rate (GFR) and Staging                           



                          of Kidney Disease*                           



                          +---------------------                           



                          --+-------------------                           



                          --+-------------------                           



                          ------+| GFR                           



                          (mL/min/1.73 m2) ?|                           



                          With Kidney Damage ?|                           



                          ?Without Kidney                           



                          Damage+---------------                           



                          --------+-------------                           



                          --------+-------------                           



                          ------------+| ?>90 ?                           



                          ? ? ? ? ? ? ? ?|                           



                          ?Stage one ? ? ? ? ?|                           



                          ? Normal ? ? ? ? ? ? ?                           



                          ?+--------------------                           



                          ---+------------------                           



                          ---+------------------                           



                          -------+| ?60-89 ? ? ?                           



                          ? ? ? ? ?| ?Stage two                           



                          ? ? ? ? ?| ? Decreased                           



                          GFR ? ? ? ?                            



                          +---------------------                           



                          --+-------------------                           



                          --+-------------------                           



                          ------+| ?30-59 ? ? ?                           



                          ? ? ? ? ?| ?Stage                           



                          three ? ? ? ?| ? Stage                           



                          three ? ? ? ? ?                           



                          +---------------------                           



                          --+-------------------                           



                          --+-------------------                           



                          ------+| ?15-29 ? ? ?                           



                          ? ? ? ? ?| ?Stage four                           



                          ? ? ? ? | ? Stage four                           



                          ? ? ? ? ?                              



                          ?+--------------------                           



                          ---+------------------                           



                          ---+------------------                           



                          -------+| ?<15 (or                           



                          dialysis) ? ?| ?Stage                           



                          five ? ? ? ? | ? Stage                           



                          five ? ? ? ? ?                           



                          ?+--------------------                           



                          ---+------------------                           



                          ---+------------------                           



                          -------+ *Each stage                           



                          assumes the associated                           



                          GFR level has been in                           



                          effect for at least                           



                          three months. ?Stages                           



                          1 to 5, with or                           



                          without kidney                           



                          disease, indicate                           



                          chronic kidney                           



                          disease. Notes:                           



                          Determination of                           



                          stages one and two                           



                          (with eGFR                             



                          >59mL/min/1.73 m2)                           



                          requires estimation of                           



                          kidney damage for at                           



                          least three months as                           



                          defined by structural                           



                          or functional                           



                          abnormalities of the                           



                          kidney, manifested by                           



                          either:Pathological                           



                          abnormalities or                           



                          Markers of kidney                           



                          damage (including                           



                          abnormalities in the                           



                          composition of the                           



                          blood or urine or                           



                          abnormalities in                           



                          imaging tests).                           

 

             Lab Interpretation Abnormal                               



             (test code = 33263-0)                                        



[facility] WITH [id number] 15:29:00





             Test Item    Value        Reference Range Interpretation Comments

 

             WBC (test code =              See_Comment                [Automated



             5419-2)                                             message] The sy

stem



                                                                 which generated



                                                                 this result



                                                                 transmitted



                                                                 reference range

:



                                                                 4.30 - 11.10



                                                                 10*3/?L. The



                                                                 reference range

 was



                                                                 not used to



                                                                 interpret this



                                                                 result as



                                                                 normal/abnormal

.

 

             RBC (test code =              See_Comment  L             [Automated



             079-8)                                              message] The sy

stem



                                                                 which generated



                                                                 this result



                                                                 transmitted



                                                                 reference range

:



                                                                 3.93 - 5.25



                                                                 10*6/?L. The



                                                                 reference range

 was



                                                                 not used to



                                                                 interpret this



                                                                 result as



                                                                 normal/abnormal

.

 

             HGB (test code = 11.2 g/dL    11.6-15      L            



             718-7)                                              

 

             HCT (test code = 34.1 %       35.7-45.2    L            



             4544-3)                                             

 

             MCV (test code = 87.0 fL      80.6-95.5                 



             787-2)                                              

 

             MCH (test code = 28.6 pg      25.9-32.8                 



             785-6)                                              

 

             MCHC (test code = 32.8 g/dL    31.6-35.1                 



             786-4)                                              

 

             RDW-SD (test code = 48.1 fL      39-49.9                   



             98475-7)                                            

 

             RDW-CV (test code = 15.1 %       12-15.5                   



             788-0)                                              

 

             PLT (test code =              See_Comment                [Automated



             777-3)                                              message] The sy

stem



                                                                 which generated



                                                                 this result



                                                                 transmitted



                                                                 reference range

:



                                                                 166 - 358 10*3/

?L.



                                                                 The reference r

crystal



                                                                 was not used to



                                                                 interpret this



                                                                 result as



                                                                 normal/abnormal

.

 

             MPV (test code = 9.3 fL       9.5-12.9     L            



             76655-3)                                            

 

             NRBC/100 WBC (test              See_Comment                [Automat

ed



             code = 1109152266)                                        message] 

The system



                                                                 which generated



                                                                 this result



                                                                 transmitted



                                                                 reference range

:



                                                                 0.0 - 10.0 /100



                                                                 WBCs. The refer

ence



                                                                 range was not u

sed



                                                                 to interpret th

is



                                                                 result as



                                                                 normal/abnormal

.

 

             NRBC x10^3 (test code <0.01        See_Comment                [Auto

mated



             = [id number])                                        message] The s

ystem



                                                                 which generated



                                                                 this result



                                                                 transmitted



                                                                 reference range

:



                                                                 10*3/?L. The



                                                                 reference range

 was



                                                                 not used to



                                                                 interpret this



                                                                 result as



                                                                 normal/abnormal

.

 

             GRAN MAT (NEUT) % 86.9 %                                 



             (test code = 770-8)                                        

 

             IMM GRAN % (test code 0.50 %                                 



             = 4447371299)                                        

 

             LYMPH % (test code = 7.5 %                                  



             736-9)                                              

 

             MONO % (test code = 4.7 %                                  



             5905-5)                                             

 

             EOS % (test code = 0.2 %                                  



             713-8)                                              

 

             BASO % (test code = 0.2 %                                  



             706-2)                                              

 

             GRAN MAT x10^3(ANC) 7.16 10*3/uL 1.88-7.09    H            



             (test code =                                        



             0280139651)                                         

 

             IMM GRAN x10^3 (test 0.04 10*3/uL 0-0.06                    



             code = 1252678238)                                        

 

             LYMPH x10^3 (test code 0.62 10*3/uL 1.32-3.29    L            



             = 731-0)                                            

 

             MONO x10^3 (test code 0.39 10*3/uL 0.33-0.92                 



             = 742-7)                                            

 

             EOS x10^3 (test code = <0.03        0.03-0.39    L            



             711-2)                                              

 

             BASO x10^3 (test code <0.03        0.01-0.07                 



             = 704-7)                                            

 

             Lab Interpretation Abnormal                               



             (test code = 44197-5)                                        



[facility] WITH [id number] 14:33:00





             Test Item    Value        Reference Range Interpretation Comments

 

             WBC (test code =              See_Comment                [Automated



             6690-2)                                             message] The sy

stem



                                                                 which generated



                                                                 this result



                                                                 transmitted



                                                                 reference range

:



                                                                 4.30 - 11.10



                                                                 10*3/?L. The



                                                                 reference range

 was



                                                                 not used to



                                                                 interpret this



                                                                 result as



                                                                 normal/abnormal

.

 

             RBC (test code =              See_Comment  L             [Automated



             789-8)                                              message] The sy

stem



                                                                 which generated



                                                                 this result



                                                                 transmitted



                                                                 reference range

:



                                                                 3.93 - 5.25



                                                                 10*6/?L. The



                                                                 reference range

 was



                                                                 not used to



                                                                 interpret this



                                                                 result as



                                                                 normal/abnormal

.

 

             HGB (test code = 10.5 g/dL    11.6-15      L            



             718-7)                                              

 

             HCT (test code = 33.1 %       35.7-45.2    L            



             4544-3)                                             

 

             MCV (test code = 84.7 fL      80.6-95.5                 



             787-2)                                              

 

             MCH (test code = 26.9 pg      25.9-32.8                 



             785-6)                                              

 

             MCHC (test code = 31.7 g/dL    31.6-35.1                 



             786-4)                                              

 

             RDW-SD (test code = 45.6 fL      39-49.9                   



             02291-0)                                            

 

             RDW-CV (test code = 14.8 %       12-15.5                   



             788-0)                                              

 

             PLT (test code =              See_Comment  L             [Automated



             777-3)                                              message] The sy

stem



                                                                 which generated



                                                                 this result



                                                                 transmitted



                                                                 reference range

:



                                                                 166 - 358 10*3/

?L.



                                                                 The reference r

crystal



                                                                 was not used to



                                                                 interpret this



                                                                 result as



                                                                 normal/abnormal

.

 

             MPV (test code = 9.8 fL       9.5-12.9                  



             96860-8)                                            

 

             NRBC/100 WBC (test              See_Comment                [Automat

ed



             code = 2288808722)                                        message] 

The system



                                                                 which generated



                                                                 this result



                                                                 transmitted



                                                                 reference range

:



                                                                 0.0 - 10.0 /100



                                                                 WBCs. The refer

ence



                                                                 range was not u

sed



                                                                 to interpret th

is



                                                                 result as



                                                                 normal/abnormal

.

 

             NRBC x10^3 (test code <0.01        See_Comment                [Auto

mated



             = 0684180077)                                        message] The s

ystem



                                                                 which generated



                                                                 this result



                                                                 transmitted



                                                                 reference range

:



                                                                 10*3/?L. The



                                                                 reference range

 was



                                                                 not used to



                                                                 interpret this



                                                                 result as



                                                                 normal/abnormal

.

 

             GRAN MAT (NEUT) % 64.8 %                                 



             (test code = 770-8)                                        

 

             IMM GRAN % (test code 0.40 %                                 



             = 3356124599)                                        

 

             LYMPH % (test code = 23.4 %                                 



             736-9)                                              

 

             MONO % (test code = 9.7 %                                  



             5905-5)                                             

 

             EOS % (test code = 1.1 %                                  



             713-8)                                              

 

             BASO % (test code = 0.6 %                                  



             706-2)                                              

 

             GRAN MAT x10^3(ANC) 3.01 10*3/uL 1.88-7.09                 



             (test code =                                        



             8014931372)                                         

 

             IMM GRAN x10^3 (test <0.03        0-0.06                    



             code = [id number])                                        

 

             LYMPH x10^3 (test code 1.09 10*3/uL 1.32-3.29    L            



             = 731-0)                                            

 

             MONO x10^3 (test code 0.45 10*3/uL 0.33-0.92                 



             = 742-7)                                            

 

             EOS x10^3 (test code = 0.05 10*3/uL 0.03-0.39                 



             711-2)                                              

 

             BASO x10^3 (test code 0.03 10*3/uL 0.01-0.07                 



             = 704-7)                                            

 

             Lab Interpretation Abnormal                               



             (test code = 30858-0)                                        



[facility] METABOLIC PANEL (NA, K, CL, CO2, 
GLUCOSE, BUN, CREATININE, CA)2020 12:56:00





             Test Item    Value        Reference Range Interpretation Comments

 

             NA (test code = 134 mmol/L   135-145      L            



             [id number])                                         

 

             K (test code = 5.0 mmol/L   3.5-5                     



             4507310065)                                         

 

             CL (test code = 107 mmol/L                       



             6289282229)                                         

 

             CO2 TOTAL (test code = 24 mmol/L    23-31                     



             0278954662)                                         

 

             AGAP (test code =              2-16                      



             7079668585)                                         

 

             BUN (test code = 12 mg/dL     7-23                      



             9247044190)                                         

 

             GLUCOSE (test code = 86 mg/dL                         



             9967383476)                                         

 

             CREATININE (test code = 0.57 mg/dL   0.5-1.04                  



             6332519857)                                         

 

             CALCIUM (test code = 8.6 mg/dL    8.6-10.6                  



             3367324059)                                         

 

             eGFR Calculation              mL/min/1.73m2              



             (Non-)                                        



             (test code =                                        



             7094483692)                                         

 

             eGFR Calculation              mL/min/1.73m2              



             (African American)                                        



             (test code =                                        



             9490000358)                                         

 

             LIANE (test code = LIANE) Association of                           



                          Glomerular Filtration                           



                          Rate (GFR) and Staging                           



                          of Kidney Disease*                           



                          +---------------------                           



                          --+-------------------                           



                          --+-------------------                           



                          ------+| GFR                           



                          (mL/min/1.73 m2) ?|                           



                          With Kidney Damage ?|                           



                          ?Without Kidney                           



                          Damage+---------------                           



                          --------+-------------                           



                          --------+-------------                           



                          ------------+| ?>90 ?                           



                          ? ? ? ? ? ? ? ?|                           



                          ?Stage one ? ? ? ? ?|                           



                          ? Normal ? ? ? ? ? ? ?                           



                          ?+--------------------                           



                          ---+------------------                           



                          ---+------------------                           



                          -------+| ?60-89 ? ? ?                           



                          ? ? ? ? ?| ?Stage two                           



                          ? ? ? ? ?| ? Decreased                           



                          GFR ? ? ? ?                            



                          +---------------------                           



                          --+-------------------                           



                          --+-------------------                           



                          ------+| ?30-59 ? ? ?                           



                          ? ? ? ? ?| ?Stage                           



                          three ? ? ? ?| ? Stage                           



                          three ? ? ? ? ?                           



                          +---------------------                           



                          --+-------------------                           



                          --+-------------------                           



                          ------+| ?15-29 ? ? ?                           



                          ? ? ? ? ?| ?Stage four                           



                          ? ? ? ? | ? Stage four                           



                          ? ? ? ? ?                              



                          ?+--------------------                           



                          ---+------------------                           



                          ---+------------------                           



                          -------+| ?<15 (or                           



                          dialysis) ? ?| ?Stage                           



                          five ? ? ? ? | ? Stage                           



                          five ? ? ? ? ?                           



                          ?+--------------------                           



                          ---+------------------                           



                          ---+------------------                           



                          -------+ *Each stage                           



                          assumes the associated                           



                          GFR level has been in                           



                          effect for at least                           



                          three months. ?Stages                           



                          1 to 5, with or                           



                          without kidney                           



                          disease, indicate                           



                          chronic kidney                           



                          disease. Notes:                           



                          Determination of                           



                          stages one and two                           



                          (with eGFR                             



                          >59mL/min/1.73 m2)                           



                          requires estimation of                           



                          kidney damage for at                           



                          least three months as                           



                          defined by structural                           



                          or functional                           



                          abnormalities of the                           



                          kidney, manifested by                           



                          either:Pathological                           



                          abnormalities or                           



                          Markers of kidney                           



                          damage (including                           



                          abnormalities in the                           



                          composition of the                           



                          blood or urine or                           



                          abnormalities in                           



                          imaging tests).                           

 

             Lab Interpretation Abnormal                               



             (test code = 53601-8)                                        



[facility]





             Test Item    Value        Reference Range Interpretation Comments

 

             MAGNESIUM (test code = 9558778788) 1.7 mg/dL    1.7-2.4            

       

 

             Lab Interpretation (test code = Normal                             

    



             83279-8)                                            



[facility]





             Test Item    Value        Reference Range Interpretation Comments

 

             TROPONIN I (test 0.007 ng/mL  See_Comment                [Automated



             code = 6921880359)                                        message] 

The



                                                                 system which



                                                                 generated this



                                                                 result



                                                                 transmitted



                                                                 reference range

:



                                                                 <=0.034. The



                                                                 reference range



                                                                 was not used to



                                                                 interpret this



                                                                 result as



                                                                 normal/abnormal

.

 

             LIANE (test code = Equal or Less than                           



             LIANE)         0.034                                  



                          ng/ml---Normal                           



                          ?Note: Cardiac                           



                          troponin begins to                           



                          rise 3-4 hours                           



                          after the onset of                           



                          ischemia. Repeat                           



                          in 4-6 hours if                           



                          the sample was                           



                          drawn within 3-4                           



                          hours of the onset                           



                          of the symptom and                           



                          found normal.                           



                          Between 0.035 and                           



                          0.120 ng/mL---                           



                          Borderline.                            



                          Questionable                           



                          myocardial injury                           



                          or necrosis ?                           



                          ?Note: Serial                           



                          measurement may be                           



                          necessary to                           



                          confirm or exclude                           



                          the diagnosis of                           



                          myocardial injury                           



                          or necrosis;                           



                          Clinical                               



                          correlation                            



                          (symptoms, EKGs,                           



                          imaging studies,                           



                          and others)                            



                          required; Repeat                           



                          in 4-6 hours if                           



                          clinically                             



                          indicated. ? ? ? ?                           



                          Equal or Higher                           



                          than 0.121                             



                          ng/mL---Abnormal.                           



                          Myocardial Injury                           



                          or Necrosis Likely                           



                          ? ? ? ?  Biotin                           



                          has been reported                           



                          to cause a                             



                          negative bias,                           



                          interpret results                           



                          relative to                            



                          patient's use of                           



                          biotin. ? ? ? ? ?                           



                          ? ? ? ? ? ? ? ? ?                           



                          ? ? ? ? ? ? ? ?  ?                           



                          ? ? ? ? ? ? ? ? ?                           



                          ? ? ? ? ? ? ? ? ?                           



                          ? ? ? ? ? ? ? ? ?                           

 

             Lab Interpretation Normal                                 



             (test code =                                        



             26407-0)                                            



[facility], K, CL, CO2, 
GLUCOSE, BUN, CREATININE, CA)2020 10:18:00





             Test Item    Value        Reference Range Interpretation Comments

 

             NA (test code = 137 mmol/L   135-145                   



             8835676566)                                         

 

             K (test code = 3.2 mmol/L   3.5-5        L            



             7242468183)                                         

 

             CL (test code = 107 mmol/L                       



             1242335125)                                         

 

             CO2 TOTAL (test code = 24 mmol/L    23-31                     



             0495106273)                                         

 

             AGAP (test code =              2-16                      



             2671492450)                                         

 

             BUN (test code = 13 mg/dL     7-23                      



             7785954845)                                         

 

             GLUCOSE (test code = 102 mg/dL                        



             4887242568)                                         

 

             CREATININE (test code = 0.70 mg/dL   0.5-1.04                  



             9884692186)                                         

 

             CALCIUM (test code = 8.8 mg/dL    8.6-10.6                  



             3044006702)                                         

 

             eGFR Calculation              mL/min/1.73m2              



             (Non-)                                        



             (test code =                                        



             4891088441)                                         

 

             eGFR Calculation              mL/min/1.73m2              



             (African American)                                        



             (test code =                                        



             2038228672)                                         

 

             LIANE (test code = LIANE) Association of                           



                          Glomerular Filtration                           



                          Rate (GFR) and Staging                           



                          of Kidney Disease*                           



                          +---------------------                           



                          --+-------------------                           



                          --+-------------------                           



                          ------+| GFR                           



                          (mL/min/1.73 m2) ?|                           



                          With Kidney Damage ?|                           



                          ?Without Kidney                           



                          Damage+---------------                           



                          --------+-------------                           



                          --------+-------------                           



                          ------------+| ?>90 ?                           



                          ? ? ? ? ? ? ? ?|                           



                          ?Stage one ? ? ? ? ?|                           



                          ? Normal ? ? ? ? ? ? ?                           



                          ?+--------------------                           



                          ---+------------------                           



                          ---+------------------                           



                          -------+| ?60-89 ? ? ?                           



                          ? ? ? ? ?| ?Stage two                           



                          ? ? ? ? ?| ? Decreased                           



                          GFR ? ? ? ?                            



                          +---------------------                           



                          --+-------------------                           



                          --+-------------------                           



                          ------+| ?30-59 ? ? ?                           



                          ? ? ? ? ?| ?Stage                           



                          three ? ? ? ?| ? Stage                           



                          three ? ? ? ? ?                           



                          +---------------------                           



                          --+-------------------                           



                          --+-------------------                           



                          ------+| ?15-29 ? ? ?                           



                          ? ? ? ? ?| ?Stage four                           



                          ? ? ? ? | ? Stage four                           



                          ? ? ? ? ?                              



                          ?+--------------------                           



                          ---+------------------                           



                          ---+------------------                           



                          -------+| ?<15 (or                           



                          dialysis) ? ?| ?Stage                           



                          five ? ? ? ? | ? Stage                           



                          five ? ? ? ? ?                           



                          ?+--------------------                           



                          ---+------------------                           



                          ---+------------------                           



                          -------+ *Each stage                           



                          assumes the associated                           



                          GFR level has been in                           



                          effect for at least                           



                          three months. ?Stages                           



                          1 to 5, with or                           



                          without kidney                           



                          disease, indicate                           



                          chronic kidney                           



                          disease. Notes:                           



                          Determination of                           



                          stages one and two                           



                          (with eGFR                             



                          >59mL/min/1.73 m2)                           



                          requires estimation of                           



                          kidney damage for at                           



                          least three months as                           



                          defined by structural                           



                          or functional                           



                          abnormalities of the                           



                          kidney, manifested by                           



                          either:Pathological                           



                          abnormalities or                           



                          Markers of kidney                           



                          damage (including                           



                          abnormalities in the                           



                          composition of the                           



                          blood or urine or                           



                          abnormalities in                           



                          imaging tests).                           

 

             Lab Interpretation Abnormal                               



             (test code = 91246-3)                                        



[facility] WITH [id number] 09:40:00





             Test Item    Value        Reference Range Interpretation Comments

 

             WBC (test code =              See_Comment  L             [Automated



             1790-2)                                             message] The sy

stem



                                                                 which generated



                                                                 this result



                                                                 transmitted



                                                                 reference range

:



                                                                 4.30 - 11.10



                                                                 10*3/?L. The



                                                                 reference range

 was



                                                                 not used to



                                                                 interpret this



                                                                 result as



                                                                 normal/abnormal

.

 

             RBC (test code =              See_Comment  L             [Automated



             789-8)                                              message] The sy

stem



                                                                 which generated



                                                                 this result



                                                                 transmitted



                                                                 reference range

:



                                                                 3.93 - 5.25



                                                                 10*6/?L. The



                                                                 reference range

 was



                                                                 not used to



                                                                 interpret this



                                                                 result as



                                                                 normal/abnormal

.

 

             HGB (test code = 10.0 g/dL    11.6-15      L            



             718-7)                                              

 

             HCT (test code = 31.5 %       35.7-45.2    L            



             4544-3)                                             

 

             MCV (test code = 84.2 fL      80.6-95.5                 



             787-2)                                              

 

             MCH (test code = 26.7 pg      25.9-32.8                 



             785-6)                                              

 

             MCHC (test code = 31.7 g/dL    31.6-35.1                 



             786-4)                                              

 

             RDW-SD (test code = 44.7 fL      39-49.9                   



             28892-0)                                            

 

             RDW-CV (test code = 14.4 %       12-15.5                   



             788-0)                                              

 

             PLT (test code =              See_Comment  L             [Automated



             777-3)                                              message] The sy

stem



                                                                 which generated



                                                                 this result



                                                                 transmitted



                                                                 reference range

:



                                                                 166 - 358 10*3/

?L.



                                                                 The reference r

crystal



                                                                 was not used to



                                                                 interpret this



                                                                 result as



                                                                 normal/abnormal

.

 

             MPV (test code = 9.4 fL       9.5-12.9     L            



             80536-1)                                            

 

             NRBC/100 WBC (test              See_Comment                [Automat

ed



             code = 9383533125)                                        message] 

The system



                                                                 which generated



                                                                 this result



                                                                 transmitted



                                                                 reference range

:



                                                                 0.0 - 10.0 /100



                                                                 WBCs. The refer

ence



                                                                 range was not u

sed



                                                                 to interpret th

is



                                                                 result as



                                                                 normal/abnormal

.

 

             NRBC x10^3 (test code <0.01        See_Comment                [Auto

mated



             = [id number])                                        message] The s

ystem



                                                                 which generated



                                                                 this result



                                                                 transmitted



                                                                 reference range

:



                                                                 10*3/?L. The



                                                                 reference range

 was



                                                                 not used to



                                                                 interpret this



                                                                 result as



                                                                 normal/abnormal

.

 

             GRAN MAT (NEUT) % 63.1 %                                 



             (test code = 770-8)                                        

 

             IMM GRAN % (test code 0.50 %                                 



             = 9351154470)                                        

 

             LYMPH % (test code = 26.1 %                                 



             736-9)                                              

 

             MONO % (test code = 8.6 %                                  



             5905-5)                                             

 

             EOS % (test code = 1.0 %                                  



             713-8)                                              

 

             BASO % (test code = 0.7 %                                  



             706-2)                                              

 

             GRAN MAT x10^3(ANC) 2.66 10*3/uL 1.88-7.09                 



             (test code =                                        



             3328913267)                                         

 

             IMM GRAN x10^3 (test <0.03        0-0.06                    



             code = 1448550405)                                        

 

             LYMPH x10^3 (test code 1.10 10*3/uL 1.32-3.29    L            



             = 731-0)                                            

 

             MONO x10^3 (test code 0.36 10*3/uL 0.33-0.92                 



             = 742-7)                                            

 

             EOS x10^3 (test code = 0.04 10*3/uL 0.03-0.39                 



             711-2)                                              

 

             BASO x10^3 (test code 0.03 10*3/uL 0.01-0.07                 



             = 704-7)                                            

 

             Lab Interpretation Abnormal                               



             (test code = 44789-3)                                        



[id number]





             Test Item    Value        Reference Range Interpretation Comments

 

             VIT B12 (test code = 285 pg/mL    240-930                   



             7730071984)                                         

 

             LIANE (test code = LIANE) Biotin has been                           



                          reported to cause a                           



                          positive bias,                           



                          interpret results                           



                          relative to                            



                          patient's use of                           



                          biotin.                                

 

             Lab Interpretation (test Normal                                 



             code = 02118-2)                                        



[id number]





             Test Item    Value        Reference Range Interpretation Comments

 

             TROPONIN I (test 0.008 ng/mL  See_Comment                [Automated



             code = 1982485130)                                        message] 

The



                                                                 system which



                                                                 generated this



                                                                 result



                                                                 transmitted



                                                                 reference range

:



                                                                 <=0.034. The



                                                                 reference range



                                                                 was not used to



                                                                 interpret this



                                                                 result as



                                                                 normal/abnormal

.

 

             LIANE (test code = Equal or Less than                           



             LIANE)         0.034                                  



                          ng/ml---Normal                           



                          ?Note: Cardiac                           



                          troponin begins to                           



                          rise 3-4 hours                           



                          after the onset of                           



                          ischemia. Repeat                           



                          in 4-6 hours if                           



                          the sample was                           



                          drawn within 3-4                           



                          hours of the onset                           



                          of the symptom and                           



                          found normal.                           



                          Between 0.035 and                           



                          0.120 ng/mL---                           



                          Borderline.                            



                          Questionable                           



                          myocardial injury                           



                          or necrosis ?                           



                          ?Note: Serial                           



                          measurement may be                           



                          necessary to                           



                          confirm or exclude                           



                          the diagnosis of                           



                          myocardial injury                           



                          or necrosis;                           



                          Clinical                               



                          correlation                            



                          (symptoms, EKGs,                           



                          imaging studies,                           



                          and others)                            



                          required; Repeat                           



                          in 4-6 hours if                           



                          clinically                             



                          indicated. ? ? ? ?                           



                          Equal or Higher                           



                          than 0.121                             



                          ng/mL---Abnormal.                           



                          Myocardial Injury                           



                          or Necrosis Likely                           



                          ? ? ? ?  Biotin                           



                          has been reported                           



                          to cause a                             



                          negative bias,                           



                          interpret results                           



                          relative to                            



                          patient's use of                           



                          biotin. ? ? ? ? ?                           



                          ? ? ? ? ? ? ? ? ?                           



                          ? ? ? ? ? ? ? ?  ?                           



                          ? ? ? ? ? ? ? ? ?                           



                          ? ? ? ? ? ? ? ? ?                           



                          ? ? ? ? ? ? ? ? ?                           

 

             Lab Interpretation Normal                                 



             (test code =                                        



             83473-8)                                            



[id number]:00





             Test Item    Value        Reference Range Interpretation Comments

 

             FERRITIN (test code = 45.7 ng/mL                       



             [id number])                                         

 

             LIANE (test code = LIANE) Biotin has been                           



                          reported to cause a                           



                          negative bias,                           



                          interpret results                           



                          relative to                            



                          patient's use of                           



                          biotin.                                

 

             Lab Interpretation (test Normal                                 



             code = 43802-3)                                        



[facility] 
02:37:00





             Test Item    Value        Reference    Interpretation Comments



                                       Range                     

 

             HGB A1C (test code =              See_Comment                [Autom

ated



             4548-4)                                             message] The



                                                                 system which



                                                                 generated this



                                                                 result



                                                                 transmitted



                                                                 reference range

:



                                                                 4.0 - 6.0 %



                                                                 NGSP. The



                                                                 reference range



                                                                 was not used to



                                                                 interpret this



                                                                 result as



                                                                 normal/abnormal

.

 

             LIANE (test code = %A1C (NGSP)                            



             LIANE)         Interpretation                           



                          (ADA)4.8-5.6 ? ?                           



                          Normal or                              



                          (Non-Diabetic                           



                          Range)5.7-6.4 ? ?                           



                          Increased Risk                           



                          (Pre-Diabetic)>6.5 ?                           



                          ? ? ?Diabetes                           



                          Indicated                              

 

             Lab Interpretation Normal                                 



             (test code =                                        



             30795-7)                                            



[id number] 02:32:00





             Test Item    Value        Reference Range Interpretation Comments

 

             IRON (test code = 1403142621) 55 ug/dL                       

  

 

             TIBC (test code = 3032526892) 268 ug/dL    250-410                 

  

 

             % FE SAT (test code = 5094047107) 21 %         20-50               

      

 

             Lab Interpretation (test code = Normal                             

    



             56742-2)                                            



[id number]





             Test Item    Value        Reference Range Interpretation Comments

 

             TROPONIN I (test 0.007 ng/mL  See_Comment                [Automated



             code = 9310882672)                                        message] 

The



                                                                 system which



                                                                 generated this



                                                                 result



                                                                 transmitted



                                                                 reference range

:



                                                                 <=0.034. The



                                                                 reference range



                                                                 was not used to



                                                                 interpret this



                                                                 result as



                                                                 normal/abnormal

.

 

             LIANE (test code = Equal or Less than                           



             LIANE)         0.034                                  



                          ng/ml---Normal                           



                          ?Note: Cardiac                           



                          troponin begins to                           



                          rise 3-4 hours                           



                          after the onset of                           



                          ischemia. Repeat                           



                          in 4-6 hours if                           



                          the sample was                           



                          drawn within 3-4                           



                          hours of the onset                           



                          of the symptom and                           



                          found normal.                           



                          Between 0.035 and                           



                          0.120 ng/mL---                           



                          Borderline.                            



                          Questionable                           



                          myocardial injury                           



                          or necrosis ?                           



                          ?Note: Serial                           



                          measurement may be                           



                          necessary to                           



                          confirm or exclude                           



                          the diagnosis of                           



                          myocardial injury                           



                          or necrosis;                           



                          Clinical                               



                          correlation                            



                          (symptoms, EKGs,                           



                          imaging studies,                           



                          and others)                            



                          required; Repeat                           



                          in 4-6 hours if                           



                          clinically                             



                          indicated. ? ? ? ?                           



                          Equal or Higher                           



                          than 0.121                             



                          ng/mL---Abnormal.                           



                          Myocardial Injury                           



                          or Necrosis Likely                           



                          ? ? ? ?  Biotin                           



                          has been reported                           



                          to cause a                             



                          negative bias,                           



                          interpret results                           



                          relative to                            



                          patient's use of                           



                          biotin. ? ? ? ? ?                           



                          ? ? ? ? ? ? ? ? ?                           



                          ? ? ? ? ? ? ? ?  ?                           



                          ? ? ? ? ? ? ? ? ?                           



                          ? ? ? ? ? ? ? ? ?                           



                          ? ? ? ? ? ? ? ? ?                           

 

             Lab Interpretation Normal                                 



             (test code =                                        



             44376-0)                                            



[facility]Total Ch[facility], Triglycerides,
HDL)2020 00:49:00





             Test Item    Value        Reference Range Interpretation Comments

 

             CHOL (test code = 106 mg/dL    120-200      L            



             9106540149)                                         

 

             HDL (test code = 52 mg/dL     >50                       



             1774475730)                                         

 

             HDLC RATIO (test code =              See_Comment                [Au

tomated message]



             0282574035)                                         The system [employer]



                                                                 generated this



                                                                 result transmit

brody



                                                                 reference range

:



                                                                 <=4.5. The refe

rence



                                                                 range was not u

sed



                                                                 to interpret th

is



                                                                 result as



                                                                 normal/abnormal

.

 

             TRIG (test code = 43 mg/dL                         



             7997967164)                                         

 

             LDL CHOL (test code = 45 mg/dL     See_Comment                [Auto

mated message]



             79489-8)                                            The system [employer]



                                                                 generated this



                                                                 result transmit

brody



                                                                 reference range

:



                                                                 <=160. The refe

rence



                                                                 range was not u

sed



                                                                 to interpret th

is



                                                                 result as



                                                                 normal/abnormal

.

 

             VLDL (test code = 9 mg/dL      5-60                      



             [id number])                                         

 

             Lab Interpretation (test Abnormal                               



             code = 87287-8)                                        



[facility]:00





             Test Item    Value        Reference Range Interpretation Comments

 

             TSH (test code =              See_Comment               Biotin has 

been



             [id number])                                         reported to cau

se a



                                                                 negative bias,



                                                                 interpret resul

ts



                                                                 relative to jose angel east's



                                                                 use of biotin.



                                                                 [Automated mess

age]



                                                                 The system [employer]



                                                                 generated this 

result



                                                                 transmitted ref

erence



                                                                 range: 0.45 - 4

.70



                                                                 mIU/L. The refe

rence



                                                                 range was not u

sed to



                                                                 interpret this 

result



                                                                 as normal/abnor

mal.

 

             Lab Interpretation (test Normal                                 



             code = 59823-4)                                        



[facility]





             Test Item    Value        Reference Range Interpretation Comments

 

             APPEARANCE (test code = Clear        Clear                     



             0174491668)                                         

 

             COLOR (test code = Yellow       Yellow                    



             4394176929)                                         

 

             PH (test code =              4.8-8.0                   



             0755268631)                                         

 

             SP GRAVITY (test code =              1.003-1.030               



             0286022047)                                         

 

             GLU U QUAL (test code = Normal       Normal                    



             0634270812)                                         

 

             BLOOD (test code = 1+           Negative     A            



             6813823394)                                         

 

             KETONES (test code = Negative     Negative                  



             7904253600)                                         

 

             PROTEIN (test code = Negative     Negative                  



             2887-8)                                             

 

             UROBILIN (test code = Normal       Normal                    



             0938130088)                                         

 

             BILIRUBIN (test code = Negative     Negative                  



             6351810946)                                         

 

             NITRITE (test code = Negative     Negative                  



             2051311220)                                         

 

             LEUK TENZIN (test code = Negative     Negative                  



             5695392622)                                         

 

             RBC/HPF (test code =              See_Comment                [Autom

ated message]



             0843592573)                                         The system [employer]



                                                                 generated this 

result



                                                                 transmitted ref

erence



                                                                 range: 0 - 3 HP

F. The



                                                                 reference range

 was



                                                                 not used to int

erpret



                                                                 this result as



                                                                 normal/abnormal

.

 

             WBC/HPF (test code =              See_Comment                [Autom

ated message]



             2988765801)                                         The system [employer]



                                                                 generated this 

result



                                                                 transmitted ref

erence



                                                                 range: 0 - 5 HP

F. The



                                                                 reference range

 was



                                                                 not used to int

erpret



                                                                 this result as



                                                                 normal/abnormal

.

 

             BACTERIA (test code = Negative     Negative                  



             2159494102)                                         

 

             MUCOUS (test code = Slight       Negative LPF A            



             1947878547)                                         

 

             HYAL CAST (test code =              See_Comment  H             [Aut

omated message]



             5074475811)                                         The system [employer]



                                                                 generated this 

result



                                                                 transmitted ref

erence



                                                                 range: <=2 LPF.

 The



                                                                 reference range

 was



                                                                 not used to int

erpret



                                                                 this result as



                                                                 normal/abnormal

.

 

             Lab Interpretation (test Abnormal                               



             code = 56382-7)                                        



[facility] [id number] 18:45:54 No acute 
intrathoracic abnormality. PROCEDURE: XR CHEST 1 VW CLINICAL INDICATION: 
dizziness  COMPARISON: Chest x-ray dated 2017 FINDINGS: The lungs are 
clear. Small left-sided pleural effusion. No right pleuraleffusion. No . Noted 
is seen. The heart is physiologic upper limits insize. No acute bony 
abnormality. 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 
[facility], Radiant Results Inft User - 2020 12:47 PM CSTPROCEDURE: XR CHEST 1 
VWCLINICAL INDICATION: dizziness COMPARISON: Chest x-ray dated 
2017FINDINGS:The lungs are clear. Small left-sided pleural effusion. No ri
ght pleuraleffusion. No . Noted is seen. The heart is physiologic upper limits 
insize.No acute bony abnormality. Right shoulder arthroplasty changes 
withoutevidence of loosening.IMPRESSIONNo acute intrathoracic abnormality.
[facility]





             Test Item    Value        Reference Range Interpretation Comments

 

             TROPONIN I (test <0.012       See_Comment                [Automated



             code = 7472043075)                                        message] 

The



                                                                 system which



                                                                 generated this



                                                                 result



                                                                 transmitted



                                                                 reference range

:



                                                                 <=0.034 ng/mL.



                                                                 The reference



                                                                 range was not



                                                                 used to interpr

et



                                                                 this result as



                                                                 normal/abnormal

.

 

             LIANE (test code = Equal or Less than                           



             LIANE)         0.034                                  



                          ng/ml---Normal                           



                          ?Note: Cardiac                           



                          troponin begins to                           



                          rise 3-4 hours                           



                          after the onset of                           



                          ischemia. Repeat                           



                          in 4-6 hours if                           



                          the sample was                           



                          drawn within 3-4                           



                          hours of the onset                           



                          of the symptom and                           



                          found normal.                           



                          Between 0.035 and                           



                          0.120 ng/mL---                           



                          Borderline.                            



                          Questionable                           



                          myocardial injury                           



                          or necrosis ?                           



                          ?Note: Serial                           



                          measurement may be                           



                          necessary to                           



                          confirm or exclude                           



                          the diagnosis of                           



                          myocardial injury                           



                          or necrosis;                           



                          Clinical                               



                          correlation                            



                          (symptoms, EKGs,                           



                          imaging studies,                           



                          and others)                            



                          required; Repeat                           



                          in 4-6 hours if                           



                          clinically                             



                          indicated. ? ? ? ?                           



                          Equal or Higher                           



                          than 0.121                             



                          ng/mL---Abnormal.                           



                          Myocardial Injury                           



                          or Necrosis Likely                           



                          ? ? ? ?  Biotin                           



                          has been reported                           



                          to cause a                             



                          negative bias,                           



                          interpret results                           



                          relative to                            



                          patient's use of                           



                          biotin. ? ? ? ? ?                           



                          ? ? ? ? ? ? ? ? ?                           



                          ? ? ? ? ? ? ? ?  ?                           



                          ? ? ? ? ? ? ? ? ?                           



                          ? ? ? ? ? ? ? ? ?                           



                          ? ? ? ? ? ? ? ? ?                           

 

             Lab Interpretation Normal                                 



             (test code =                                        



             74030-6)                                            



[facility]. METABOLIC PANEL (50460)2020 
18:18:00





             Test Item    Value        Reference Range Interpretation Comments

 

             NA (test code = 137 mmol/L   135-145                   



             3317808759)                                         

 

             K (test code = 3.6 mmol/L   3.5-5                     



             5296326109)                                         

 

             CL (test code = 104 mmol/L                       



             2459473139)                                         

 

             CO2 TOTAL (test code = 28 mmol/L    23-31                     



             7946442909)                                         

 

             AGAP (test code =              2-16                      



             0002409834)                                         

 

             BUN (test code = 13 mg/dL     7-23                      



             2075155926)                                         

 

             GLUCOSE (test code = 126 mg/dL           H            



             6029768680)                                         

 

             CREATININE (test code = 0.68 mg/dL   0.5-1.04                  



             1020174412)                                         

 

             TOTAL BILI (test code = 0.7 mg/dL    0.1-1.1                   



             7881878390)                                         

 

             CALCIUM (test code = 9.1 mg/dL    8.6-10.6                  



             6215130584)                                         

 

             T PROTEIN (test code = 7.1 g/dL     6.3-8.2                   



             1053591687)                                         

 

             ALBUMIN (test code = 3.5 g/dL     3.5-5                     



             8270581152)                                         

 

             ALK PHOS (test code = 111 U/L                          



             2636568334)                                         

 

             ALTv (test code = 24 U/L       5-35                      



             1742-6)                                             

 

             AST(SGOT) (test code = 44 U/L       13-40        H            



             5249822221)                                         

 

             eGFR Calculation              mL/min/1.73m2              



             (Non-)                                        



             (test code =                                        



             3328847633)                                         

 

             eGFR Calculation              mL/min/1.73m2              



             (African American)                                        



             (test code =                                        



             0142413593)                                         

 

             LIANE (test code = LIANE) Association of                           



                          Glomerular Filtration                           



                          Rate (GFR) and Staging                           



                          of Kidney Disease*                           



                          +---------------------                           



                          --+-------------------                           



                          --+-------------------                           



                          ------+| GFR                           



                          (mL/min/1.73 m2) ?|                           



                          With Kidney Damage ?|                           



                          ?Without Kidney                           



                          Damage+---------------                           



                          --------+-------------                           



                          --------+-------------                           



                          ------------+| ?>90 ?                           



                          ? ? ? ? ? ? ? ?|                           



                          ?Stage one ? ? ? ? ?|                           



                          ? Normal ? ? ? ? ? ? ?                           



                          ?+--------------------                           



                          ---+------------------                           



                          ---+------------------                           



                          -------+| ?60-89 ? ? ?                           



                          ? ? ? ? ?| ?Stage two                           



                          ? ? ? ? ?| ? Decreased                           



                          GFR ? ? ? ?                            



                          +---------------------                           



                          --+-------------------                           



                          --+-------------------                           



                          ------+| ?30-59 ? ? ?                           



                          ? ? ? ? ?| ?Stage                           



                          three ? ? ? ?| ? Stage                           



                          three ? ? ? ? ?                           



                          +---------------------                           



                          --+-------------------                           



                          --+-------------------                           



                          ------+| ?15-29 ? ? ?                           



                          ? ? ? ? ?| ?Stage four                           



                          ? ? ? ? | ? Stage four                           



                          ? ? ? ? ?                              



                          ?+--------------------                           



                          ---+------------------                           



                          ---+------------------                           



                          -------+| ?<15 (or                           



                          dialysis) ? ?| ?Stage                           



                          five ? ? ? ? | ? Stage                           



                          five ? ? ? ? ?                           



                          ?+--------------------                           



                          ---+------------------                           



                          ---+------------------                           



                          -------+ *Each stage                           



                          assumes the associated                           



                          GFR level has been in                           



                          effect for at least                           



                          three months. ?Stages                           



                          1 to 5, with or                           



                          without kidney                           



                          disease, indicate                           



                          chronic kidney                           



                          disease. Notes:                           



                          Determination of                           



                          stages one and two                           



                          (with eGFR                             



                          >59mL/min/1.73 m2)                           



                          requires estimation of                           



                          kidney damage for at                           



                          least three months as                           



                          defined by structural                           



                          or functional                           



                          abnormalities of the                           



                          kidney, manifested by                           



                          either:Pathological                           



                          abnormalities or                           



                          Markers of kidney                           



                          damage (including                           



                          abnormalities in the                           



                          composition of the                           



                          blood or urine or                           



                          abnormalities in                           



                          imaging tests).                           

 

             Lab Interpretation Abnormal                               



             (test code = 76876-6)                                        



[facility] WITH [id number] 18:09:00





             Test Item    Value        Reference Range Interpretation Comments

 

             WBC (test code =              See_Comment                [Automated



             8460-2)                                             message] The sy

stem



                                                                 which generated



                                                                 this result



                                                                 transmitted



                                                                 reference range

:



                                                                 4.30 - 11.10



                                                                 10*3/?L. The



                                                                 reference range

 was



                                                                 not used to



                                                                 interpret this



                                                                 result as



                                                                 normal/abnormal

.

 

             RBC (test code =              See_Comment                [Automated



             563-8)                                              message] The sy

stem



                                                                 which generated



                                                                 this result



                                                                 transmitted



                                                                 reference range

:



                                                                 3.93 - 5.25



                                                                 10*6/?L. The



                                                                 reference range

 was



                                                                 not used to



                                                                 interpret this



                                                                 result as



                                                                 normal/abnormal

.

 

             HGB (test code = 11.4 g/dL    11.6-15      L            



             718-7)                                              

 

             HCT (test code = 36.1 %       35.7-45.2                 



             4544-3)                                             

 

             MCV (test code = 85.1 fL      80.6-95.5                 



             787-2)                                              

 

             MCH (test code = 26.9 pg      25.9-32.8                 



             785-6)                                              

 

             MCHC (test code = 31.6 g/dL    31.6-35.1                 



             786-4)                                              

 

             RDW-SD (test code = 44.9 fL      39-49.9                   



             64648-6)                                            

 

             RDW-CV (test code = 14.6 %       12-15.5                   



             788-0)                                              

 

             PLT (test code =              See_Comment  L             [Automated



             777-3)                                              message] The sy

stem



                                                                 which generated



                                                                 this result



                                                                 transmitted



                                                                 reference range

:



                                                                 166 - 358 10*3/

?L.



                                                                 The reference r

crystal



                                                                 was not used to



                                                                 interpret this



                                                                 result as



                                                                 normal/abnormal

.

 

             MPV (test code = 9.6 fL       9.5-12.9                  



             48506-8)                                            

 

             NRBC/100 WBC (test              See_Comment                [Automat

ed



             code = 1401240163)                                        message] 

The system



                                                                 which generated



                                                                 this result



                                                                 transmitted



                                                                 reference range

:



                                                                 0.0 - 10.0 /100



                                                                 WBCs. The refer

ence



                                                                 range was not u

sed



                                                                 to interpret th

is



                                                                 result as



                                                                 normal/abnormal

.

 

             NRBC x10^3 (test code <0.01        See_Comment                [Auto

mated



             = [id number])                                        message] The s

ystem



                                                                 which generated



                                                                 this result



                                                                 transmitted



                                                                 reference range

:



                                                                 10*3/?L. The



                                                                 reference range

 was



                                                                 not used to



                                                                 interpret this



                                                                 result as



                                                                 normal/abnormal

.

 

             GRAN MAT (NEUT) % 81.2 %                                 



             (test code = 770-8)                                        

 

             IMM GRAN % (test code 0.60 %                                 



             = [id number])                                        

 

             LYMPH % (test code = 11.7 %                                 



             736-9)                                              

 

             MONO % (test code = 5.9 %                                  



             5905-5)                                             

 

             EOS % (test code = 0.3 %                                  



             713-8)                                              

 

             BASO % (test code = 0.3 %                                  



             706-2)                                              

 

             GRAN MAT x10^3(ANC) 5.53 10*3/uL 1.88-7.09                 



             (test code =                                        



             5211828644)                                         

 

             IMM GRAN x10^3 (test 0.04 10*3/uL 0-0.06                    



             code = 2640981556)                                        

 

             LYMPH x10^3 (test code 0.80 10*3/uL 1.32-3.29    L            



             = 731-0)                                            

 

             MONO x10^3 (test code 0.40 10*3/uL 0.33-0.92                 



             = 742-7)                                            

 

             EOS x10^3 (test code = <0.03        0.03-0.39    L            



             711-2)                                              

 

             BASO x10^3 (test code <0.03        0.01-0.07                 



             = 704-7)                                            

 

             Lab Interpretation Abnormal                               



             (test code = 55940-0)                                        



[facility]

## 2022-02-06 NOTE — RAD REPORT
EXAM DESCRIPTION:  RAD - Pelvis - 2/6/2022 11:45 am

 

CLINICAL HISTORY:  BLUNT TRAUMA

 

COMPARISON:  No comparisons

 

FINDINGS:  Significant right hip osteoarthritis is seen. No acute fracture or dislocation is evident.

## 2022-02-06 NOTE — RAD REPORT
EXAM DESCRIPTION:  RAD - Hip Left 2 View - 2/6/2022 11:46 am

 

CLINICAL HISTORY:  PAIN

 

COMPARISON:  No comparisons

 

FINDINGS:  No acute fracture or dislocation is seen.

## 2022-02-06 NOTE — RAD REPORT
EXAM DESCRIPTION:  RAD - Hip Right 2 View - 2/6/2022 11:46 am

 

CLINICAL HISTORY:  PAIN

 

COMPARISON:  No comparisons

 

FINDINGS:  Severe right hip osteoarthritis is present. No acute fracture or dislocation seen.

## 2022-02-06 NOTE — ER
Nurse's Notes                                                                                     

 [facility]                                                                 

Name: Radha Garza                                                                            

Age: 76 yrs                                                                                       

Sex: Female                                                                                       

: 1945                                                                                   

MRN: [id number]                                                                                   

Arrival Date: 2022                                                                          

Time: 10:27                                                                                       

Account#: [id number]                                                                            

Bed 4                                                                                             

Private MD:                                                                                       

Diagnosis: Unspecified injury of head, initial encounter;Contusion of left knee;Contusion of right

  knee;Contusion of lip                                                                           

                                                                                                  

Presentation:                                                                                     

                                                                                             

10:34 Chief complaint:. Chief complaint: EMS states: Patient fell face down from bed at       [facility] 

      nursing facility, has nose and mouth bleeding .Hx of dementia and Alzheimer, not on         

      blood thinner. Coronavirus screen: Vaccine status:. Ebola Screen: No symptoms or risks      

      identified at this time. Initial Sepsis Screen: Does the patient meet any 2 criteria?       

      No. Patient's initial sepsis screen is negative. Does the patient have a suspected          

      source of infection? No. Patient's initial sepsis screen is negative. Risk Assessment:      

      Do you want to hurt yourself or someone else? Unable to obtain. Onset of symptoms was       

      2022 at 09:15.                                                                 

10:34 Method Of Arrival: EMS: [facility]                                                       ke1 

10:34 Acuity: OZZIE 3                                                                           ke1 

                                                                                                  

Triage Assessment:                                                                                

10:44 General: Appears uncomfortable. Pain: Unable to use pain scale. Patient appears moaning ke1 

      while assessing lips and nose. Neuro: Level of Consciousness is awake, Oriented to none.    

                                                                                                  

Historical:                                                                                       

- Allergies:                                                                                      

10:41 Chocolate;                                                                              ke1 

10:41 Macrobid;                                                                               ke1 

10:41 Morphine;                                                                               ke1 

10:41 PENICILLINS;                                                                            ke1 

- PMHx:                                                                                           

10:42 Anemia; Atrial Fib; convulsions; Hyperlipidemia; Hypertension; Hypokalemia;             ke1 

      Hypothyroidism; traumatic subdural hemorrhage;                                              

10:43 Dementia;                                                                               ke1 

                                                                                                  

- Immunization history:: Adult Immunizations unknown.                                             

- Social history:: Smoking status: unknown.                                                       

- Family history:: not pertinent.                                                                 

- Hospitalizations: : No recent hospitalization is reported.                                      

- History obtained from: EMS.                                                                     

                                                                                                  

                                                                                                  

Screening:                                                                                        

10:33 Abuse screen: Denies threats or abuse. Nutritional screening: No deficits noted.        jd3 

      Tuberculosis screening: No symptoms or risk factors identified. Fall Risk Ambulatory        

      Aid- Crutches/Cane/Walker (15 pts). Gait- Impaired (20 pts.). Mental Status-                

      Overestimates/Forgets Limitations (15 pts.). Total Perston Fall Scale indicates High Risk     

      Score (45 or more points). Fall prevention measures have been instituted. Side Rails Up     

      X 2 Placed Close to Nursing Station Frequent Obs/Assessments Occuring.                      

                                                                                                  

Assessment:                                                                                       

10:31 General: Appears comfortable, Behavior is calm, cooperative. Pain: Complains of pain in jd3 

      nose and upper lip Quality of pain is described as aching, tender. Neuro: Level of          

      Consciousness is awake, obeys commands, confused, at baseline per nursing home staff        

      and EMS. Oriented to person. Cardiovascular: Capillary refill < 3 seconds Patient's         

      skin is warm and dry. Respiratory: Airway is patent Respiratory effort is even,             

      unlabored, Respiratory pattern is regular, symmetrical. GI: No signs and/or symptoms        

      were reported involving the gastrointestinal system. : No signs and/or symptoms were      

      reported regarding the genitourinary system. EENT: No signs and/or symptoms were            

      reported regarding the EENT system. Derm: Skin is intact, Skin is dry, Skin is normal,      

      Skin temperature is warm dried blood noted to nares. upper lipped noted to be swollen       

      at this time. Musculoskeletal: Circulation, motion, and sensation intact. Range of          

      motion: intact in all extremities.                                                          

11:59 Reassessment: No changes from previously documented assessment. Patient and/or family   jd3 

      updated on plan of care and expected duration. Pain level reassessed.                       

13:00 Reassessment: No changes from previously documented assessment. Patient and/or family   jd3 

      updated on plan of care and expected duration. Pain level reassessed. Fax sent to           

      [place] for discharge home before report is called.                                       

14:11 Reassessment: Patient appears in no apparent distress at this time. No changes from     jd3 

      previously documented assessment. Patient and/or family updated on plan of care and         

      expected duration. Pain level reassessed. report given to [place] RN.                     

                                                                                                  

Vital Signs:                                                                                      

10:34  / 70; Pulse 66; Resp 17; Temp 98.2; Pulse Ox 99% on R/A;                         ke1 

11:59  / 67; Pulse 68; Resp 16 S; Pulse Ox 100% on R/A;                                 jd3 

13:01 BP 97 / 84; Pulse 73; Resp 16 S; Pulse Ox 97% on R/A;                                   jd3 

                                                                                                  

ED Course:                                                                                        

10:27 Patient arrived in ED.                                                                  ss  

10:29 Delfin Finley MD is Attending Physician.                                                rn  

10:31 Jeremie High, RN is Primary Nurse.                                                  jd3 

10:34 Patient has correct armband on for positive identification. Placed in gown. Bed in low  jd3 

      position. Call light in reach. Side rails up X2. Pulse ox on. NIBP on.                      

10:40 Triage completed.                                                                       ke1 

10:52 Arm band placed on.                                                                     jd3 

10:52 EKG done.                                                                               jd3 

10:54 Initial lab(s) drawn, by me, sent to lab. Inserted saline lock: 20 gauge in left        jw7 

      forearm, using aseptic technique. Blood collected.                                          

11:46 XRAY Chest (1 view) In Process Unspecified.                                             EDMS

11:46 XRAY Pelvis In Process Unspecified.                                                     EDMS

11:46 XRAY Knee LEFT 2 view In Process Unspecified.                                           EDMS

11:46 XRAY Knee RIGHT 2 view In Process Unspecified.                                          EDMS

11:46 XRAY Hip RIGHT 2 view In Process Unspecified.                                           EDMS

11:46 XRAY Hip LEFT 2 view In Process Unspecified.                                            EDMS

11:50 CT Head C Spine In Process Unspecified.                                                 EDMS

11:50 CT Facial Bones W/O Con In Process Unspecified.                                         EDMS

13:01 No provider procedures requiring assistance completed.                                  jd3 

14:11 IV discontinued, intact, bleeding controlled, No redness/swelling at site. Pressure     jd3 

      dressing applied.                                                                           

                                                                                                  

Administered Medications:                                                                         

No medications were administered                                                                  

                                                                                                  

                                                                                                  

Outcome:                                                                                          

12:38 Discharge ordered by MD.                                                                rn  

13:30 Discharged to nursing home. Report called to  Aleida STEPHENSON                                 ke1 

13:30 Condition: stable                                                                           

14:11 Discharge instructions given to nursing home, Instructed on discharge instructions,     jd3 

      follow up and referral plans. Demonstrated understanding of instructions, follow-up         

      care.                                                                                       

14:15 Patient left the ED.                                                                    jd3 

                                                                                                  

Signatures:                                                                                       

Dispatcher [employer]                           EDMS                                                 

Delfin Finley MD MD rn Smirch, Shelby, RN                      RN   Jeremie Horvath, RN                    RN   jcameron OchoasMary                                  jw7                                                  

Karla Corona RN                   RN   ke1                                                  

                                                                                                  

**************************************************************************************************

## 2022-02-07 NOTE — EKG
Test Date:    2022-02-06               Test Time:    10:53:28

Technician:   HBARATH                                    

                                                     

MEASUREMENT RESULTS:                                       

Intervals:                                           

Rate:         73                                     

ND:                                                  

QRSD:         78                                     

QT:           414                                    

QTc:          456                                    

Axis:                                                

P:                                                   

ND:                                                  

QRS:          21                                     

T:            88                                     

                                                     

INTERPRETIVE STATEMENTS:                                       

                                                     

Atrial fibrillation

Abnormal ECG

Compared to ECG 05/14/2020 09:37:28

Sinus rhythm no longer present

ST (T wave) deviation no longer present



Electronically Signed On 02-07-22 11:30:34 CST by Peter Petersen